# Patient Record
Sex: MALE | Race: WHITE | NOT HISPANIC OR LATINO | Employment: OTHER | URBAN - NONMETROPOLITAN AREA
[De-identification: names, ages, dates, MRNs, and addresses within clinical notes are randomized per-mention and may not be internally consistent; named-entity substitution may affect disease eponyms.]

---

## 2021-02-23 ENCOUNTER — CONSULT (OUTPATIENT)
Dept: CARDIOLOGY | Facility: CLINIC | Age: 61
End: 2021-02-23

## 2021-02-23 VITALS
DIASTOLIC BLOOD PRESSURE: 100 MMHG | HEART RATE: 82 BPM | HEIGHT: 71 IN | TEMPERATURE: 97.8 F | SYSTOLIC BLOOD PRESSURE: 160 MMHG | BODY MASS INDEX: 34.44 KG/M2 | OXYGEN SATURATION: 99 % | RESPIRATION RATE: 18 BRPM | WEIGHT: 246 LBS

## 2021-02-23 DIAGNOSIS — I25.10 CORONARY ARTERY DISEASE INVOLVING NATIVE CORONARY ARTERY OF NATIVE HEART, ANGINA PRESENCE UNSPECIFIED: Primary | ICD-10-CM

## 2021-02-23 DIAGNOSIS — E78.5 HYPERLIPIDEMIA, UNSPECIFIED HYPERLIPIDEMIA TYPE: ICD-10-CM

## 2021-02-23 DIAGNOSIS — I10 ESSENTIAL HYPERTENSION: ICD-10-CM

## 2021-02-23 PROCEDURE — 99204 OFFICE O/P NEW MOD 45 MIN: CPT | Performed by: INTERNAL MEDICINE

## 2021-02-23 RX ORDER — LISINOPRIL 20 MG/1
20 TABLET ORAL DAILY
Qty: 90 TABLET | Refills: 3 | Status: SHIPPED | OUTPATIENT
Start: 2021-02-23 | End: 2021-03-16 | Stop reason: HOSPADM

## 2021-02-23 RX ORDER — ASPIRIN 81 MG/1
81 TABLET ORAL DAILY
COMMUNITY
End: 2021-02-23 | Stop reason: SDUPTHER

## 2021-02-23 RX ORDER — LISINOPRIL 10 MG/1
10 TABLET ORAL DAILY
COMMUNITY
End: 2021-02-23 | Stop reason: SDUPTHER

## 2021-02-23 RX ORDER — CARVEDILOL 12.5 MG/1
6.25 TABLET ORAL 2 TIMES DAILY WITH MEALS
Qty: 60 TABLET | Refills: 3 | Status: ON HOLD | OUTPATIENT
Start: 2021-02-23 | End: 2021-03-16 | Stop reason: SDUPTHER

## 2021-02-23 RX ORDER — ATORVASTATIN CALCIUM 80 MG/1
80 TABLET, FILM COATED ORAL DAILY
COMMUNITY
End: 2021-02-23 | Stop reason: SDUPTHER

## 2021-02-23 RX ORDER — ASPIRIN 81 MG/1
81 TABLET ORAL DAILY
Qty: 180 TABLET | Refills: 3 | Status: SHIPPED | OUTPATIENT
Start: 2021-02-23

## 2021-02-23 RX ORDER — ATORVASTATIN CALCIUM 80 MG/1
80 TABLET, FILM COATED ORAL DAILY
Qty: 90 TABLET | Refills: 3 | Status: SHIPPED | OUTPATIENT
Start: 2021-02-23 | End: 2022-03-03

## 2021-02-23 RX ORDER — NITROGLYCERIN 0.4 MG/1
TABLET SUBLINGUAL
Qty: 30 TABLET | Refills: 3 | Status: SHIPPED | OUTPATIENT
Start: 2021-02-23

## 2021-02-23 RX ORDER — CARVEDILOL 6.25 MG/1
6.25 TABLET ORAL 2 TIMES DAILY WITH MEALS
COMMUNITY
End: 2021-02-23 | Stop reason: SDUPTHER

## 2021-03-04 ENCOUNTER — TELEPHONE (OUTPATIENT)
Dept: CARDIOLOGY | Facility: CLINIC | Age: 61
End: 2021-03-04

## 2021-03-04 NOTE — TELEPHONE ENCOUNTER
Claudia girlfriend called stating that patient is eating NTG like [employer] TAC's he has already used 30 day Rx that was filled at the end of Feb 2021, he is to have his stress test on Monday and she is very worried for him. Please advise.

## 2021-03-05 ENCOUNTER — LAB (OUTPATIENT)
Dept: LAB | Facility: HOSPITAL | Age: 61
End: 2021-03-05

## 2021-03-05 ENCOUNTER — OFFICE VISIT (OUTPATIENT)
Dept: CARDIOLOGY | Facility: CLINIC | Age: 61
End: 2021-03-05

## 2021-03-05 VITALS
HEIGHT: 71 IN | HEART RATE: 77 BPM | SYSTOLIC BLOOD PRESSURE: 122 MMHG | WEIGHT: 245.6 LBS | DIASTOLIC BLOOD PRESSURE: 92 MMHG | BODY MASS INDEX: 34.38 KG/M2 | RESPIRATION RATE: 20 BRPM | OXYGEN SATURATION: 98 %

## 2021-03-05 DIAGNOSIS — I25.10 CORONARY ARTERY DISEASE INVOLVING NATIVE CORONARY ARTERY OF NATIVE HEART, ANGINA PRESENCE UNSPECIFIED: ICD-10-CM

## 2021-03-05 DIAGNOSIS — I20.0 UNSTABLE ANGINA (HCC): Primary | ICD-10-CM

## 2021-03-05 DIAGNOSIS — Z01.818 PRE-OP EXAM: ICD-10-CM

## 2021-03-05 PROCEDURE — C9803 HOPD COVID-19 SPEC COLLECT: HCPCS

## 2021-03-05 PROCEDURE — U0004 COV-19 TEST NON-CDC HGH THRU: HCPCS

## 2021-03-05 PROCEDURE — 99214 OFFICE O/P EST MOD 30 MIN: CPT | Performed by: INTERNAL MEDICINE

## 2021-03-05 PROCEDURE — 93000 ELECTROCARDIOGRAM COMPLETE: CPT | Performed by: INTERNAL MEDICINE

## 2021-03-05 RX ORDER — ISOSORBIDE MONONITRATE 30 MG/1
30 TABLET, EXTENDED RELEASE ORAL DAILY
Qty: 30 TABLET | Refills: 0 | Status: ON HOLD | OUTPATIENT
Start: 2021-03-05 | End: 2021-03-08 | Stop reason: SDUPTHER

## 2021-03-05 NOTE — PROGRESS NOTES
[facility] Cardiology Office Follow Up Note    Sony Marks  [id number]  2020    Primary Care Provider: Johan Tse MD    Chief Complaint: Chest pain    History of Present Illness:   Mr. Sony Marks is a 60 y.o. male who presents to the Cardiology Clinic for follow up of chest pain.  The patient has a past medical history significant for hypertension and prior tobacco use.  He has a past cardiac history significant for an anterior STEMI in .  At that time, he reportedly underwent thrombolytic therapy and subsequent thrombectomy with PCI and placement of a 3.0 x 28 mm stent in the LAD.  He initially presented to the cardiology clinic in  for evaluation of chest pain consistent with angina.  He was subsequently scheduled for an outpatient stress test, however presents today for worsening chest discomfort.  The patient reports that for approximately the past week he has been requiring frequent doses of sublingual nitroglycerin due to recurrent episodes of substernal chest discomfort.  He also notes mild shortness of breath.  The episodes primarily occur during exertion, however he has had several episodes while at rest.  His discomfort is typically relieved with a single sublingual dose of nitroglycerin.  He denies any associated nausea, vomiting, or diaphoresis.  He does note fatigue, which has been progressive.  No orthopnea, PND, or lower extremity swelling.  No other specific complaints at this time.     Past Cardiac Testin. Last Coronary Angio: , reports unavailable  2. Prior Stress Testing: None  3. Last Echo: None  4. Prior Holter Monitor: None       Review of Systems:   Review of Systems   Constitutional: Positive for fatigue. Negative for activity change, appetite change, chills, diaphoresis, fever, unexpected weight gain and unexpected weight loss.   Eyes: Negative for blurred vision and double vision.   Respiratory: Positive for chest tightness.  "Negative for cough, shortness of breath and wheezing.    Cardiovascular: Positive for chest pain. Negative for palpitations and leg swelling.   Gastrointestinal: Negative for abdominal pain, anal bleeding, blood in stool and GERD.   Endocrine: Negative for cold intolerance and heat intolerance.   Genitourinary: Negative for hematuria.   Neurological: Negative for dizziness, syncope, weakness and light-headedness.   Hematological: Does not bruise/bleed easily.   Psychiatric/Behavioral: Negative for depressed mood and stress. The patient is not nervous/anxious.        I have reviewed and/or updated the patient's past medical, past surgical, family, social history, problem list and allergies as appropriate.     Medications:     Current Outpatient Medications:   •  aspirin 81 MG EC tablet, Take 1 tablet by mouth Daily., Disp: 180 tablet, Rfl: 3  •  atorvastatin (LIPITOR) 80 MG tablet, Take 1 tablet by mouth Daily., Disp: 90 tablet, Rfl: 3  •  carvedilol (COREG) 12.5 MG tablet, Take 0.5 tablets by mouth 2 (Two) Times a Day With Meals., Disp: 60 tablet, Rfl: 3  •  lisinopril (PRINIVIL,ZESTRIL) 20 MG tablet, Take 1 tablet by mouth Daily., Disp: 90 tablet, Rfl: 3  •  nitroglycerin (NITROSTAT) 0.4 MG SL tablet, 1 under the tongue as needed for angina, may repeat q5mins for up three doses, Disp: 30 tablet, Rfl: 3    Physical Exam:  Vital Signs:   Vitals:    03/05/21 1103   Pulse: 77   Resp: 20   SpO2: 98%   Weight: 111 kg (245 lb 9.6 oz)   Height: 180.3 cm (70.98\")       Physical Exam  Constitutional:       General: He is not in acute distress.     Appearance: Normal appearance. He is well-developed. He is not diaphoretic.   HENT:      Head: Normocephalic and atraumatic.   Eyes:      General: No scleral icterus.     Pupils: Pupils are equal, round, and reactive to light.   Neck:      Trachea: No tracheal deviation.   Cardiovascular:      Rate and Rhythm: Normal rate and regular rhythm.      Heart sounds: Normal heart sounds. " No murmur. No friction rub. No gallop.       Comments: Normal JVD.  2+ right radial pulse  Pulmonary:      Effort: Pulmonary effort is normal. No respiratory distress.      Breath sounds: Normal breath sounds. No stridor. No wheezing or rales.   Chest:      Chest wall: No tenderness.   Abdominal:      General: Bowel sounds are normal. There is no distension.      Palpations: Abdomen is soft.      Tenderness: There is no abdominal tenderness. There is no guarding or rebound.   Musculoskeletal:         General: No swelling. Normal range of motion.      Cervical back: Neck supple. No tenderness.   Lymphadenopathy:      Cervical: No cervical adenopathy.   Skin:     General: Skin is warm and dry.      Findings: No erythema.   Neurological:      General: No focal deficit present.      Mental Status: He is alert and oriented to person, place, and time.   Psychiatric:         Mood and Affect: Mood normal.         Behavior: Behavior normal.         Results Review:   I reviewed the patient's new clinical results.  I personally viewed and interpreted the patient's EKG/Telemetry data      ECG 12 Lead    Date/Time: 3/5/2021 8:29 AM  Performed by: Sree Johnson MD  Authorized by: Sree Johnson MD   Comparison: not compared with previous ECG   Rhythm: sinus rhythm  Rate: normal  QRS axis: normal  Other findings comments: Prior anteroseptal MI    Clinical impression: abnormal EKG            Assessment / Plan:        1.  Coronary artery disease / Unstable Angina  --History of anterior MI in 11 with PCI to the LAD  --Current escalation in chest pain is consistent with unstable angina  --ECG shows evidence of prior anteroseptal MI, nonspecific ST changes  --Given escalation of symptoms, discussed the risks and benefits of coronary angiography with online PCI as indicated and the patient is agreeable to proceed.  Will schedule for Monday.  --Continue carvedilol and start isosorbide as antianginal  --Continue  aspirin  --Continue high intensity statin  --Sublingual nitroglycerin as needed  --Advised to present to the emergency department over the weekend if episodes of chest pain not relieved with sublingual nitroglycerin     2. Ischemic cardiomyopathy  --On review of records, appears to have a history of ischemic cardiomyopathy with an LVEF in the 30s  --Does not appear to have a history of symptomatic heart failure  --Euvolemic today  --Will repeat echocardiogram to assess LVEF  --Continue carvedilol and lisinopril    3.  Hypertension  --Currently well controlled     4. Hyperlipidemia  --On high intensity statin      Follow Up:   No follow-ups on file.      Thank you for allowing me to participate in the care of your patient. Please to not hesitate to contact me with additional questions or concerns.     GISEL Johnson MD  Interventional Cardiology   03/05/2021  11:05 EST

## 2021-03-05 NOTE — H&P (VIEW-ONLY)
[facility] Cardiology Office Follow Up Note    Sony Marks  [id number]  2020    Primary Care Provider: Johan Tse MD    Chief Complaint: Chest pain    History of Present Illness:   Mr. Sony Marks is a 60 y.o. male who presents to the Cardiology Clinic for follow up of chest pain.  The patient has a past medical history significant for hypertension and prior tobacco use.  He has a past cardiac history significant for an anterior STEMI in .  At that time, he reportedly underwent thrombolytic therapy and subsequent thrombectomy with PCI and placement of a 3.0 x 28 mm stent in the LAD.  He initially presented to the cardiology clinic in  for evaluation of chest pain consistent with angina.  He was subsequently scheduled for an outpatient stress test, however presents today for worsening chest discomfort.  The patient reports that for approximately the past week he has been requiring frequent doses of sublingual nitroglycerin due to recurrent episodes of substernal chest discomfort.  He also notes mild shortness of breath.  The episodes primarily occur during exertion, however he has had several episodes while at rest.  His discomfort is typically relieved with a single sublingual dose of nitroglycerin.  He denies any associated nausea, vomiting, or diaphoresis.  He does note fatigue, which has been progressive.  No orthopnea, PND, or lower extremity swelling.  No other specific complaints at this time.     Past Cardiac Testin. Last Coronary Angio: , reports unavailable  2. Prior Stress Testing: None  3. Last Echo: None  4. Prior Holter Monitor: None       Review of Systems:   Review of Systems   Constitutional: Positive for fatigue. Negative for activity change, appetite change, chills, diaphoresis, fever, unexpected weight gain and unexpected weight loss.   Eyes: Negative for blurred vision and double vision.   Respiratory: Positive for chest tightness.  "Negative for cough, shortness of breath and wheezing.    Cardiovascular: Positive for chest pain. Negative for palpitations and leg swelling.   Gastrointestinal: Negative for abdominal pain, anal bleeding, blood in stool and GERD.   Endocrine: Negative for cold intolerance and heat intolerance.   Genitourinary: Negative for hematuria.   Neurological: Negative for dizziness, syncope, weakness and light-headedness.   Hematological: Does not bruise/bleed easily.   Psychiatric/Behavioral: Negative for depressed mood and stress. The patient is not nervous/anxious.        I have reviewed and/or updated the patient's past medical, past surgical, family, social history, problem list and allergies as appropriate.     Medications:     Current Outpatient Medications:   •  aspirin 81 MG EC tablet, Take 1 tablet by mouth Daily., Disp: 180 tablet, Rfl: 3  •  atorvastatin (LIPITOR) 80 MG tablet, Take 1 tablet by mouth Daily., Disp: 90 tablet, Rfl: 3  •  carvedilol (COREG) 12.5 MG tablet, Take 0.5 tablets by mouth 2 (Two) Times a Day With Meals., Disp: 60 tablet, Rfl: 3  •  lisinopril (PRINIVIL,ZESTRIL) 20 MG tablet, Take 1 tablet by mouth Daily., Disp: 90 tablet, Rfl: 3  •  nitroglycerin (NITROSTAT) 0.4 MG SL tablet, 1 under the tongue as needed for angina, may repeat q5mins for up three doses, Disp: 30 tablet, Rfl: 3    Physical Exam:  Vital Signs:   Vitals:    03/05/21 1103   Pulse: 77   Resp: 20   SpO2: 98%   Weight: 111 kg (245 lb 9.6 oz)   Height: 180.3 cm (70.98\")       Physical Exam  Constitutional:       General: He is not in acute distress.     Appearance: Normal appearance. He is well-developed. He is not diaphoretic.   HENT:      Head: Normocephalic and atraumatic.   Eyes:      General: No scleral icterus.     Pupils: Pupils are equal, round, and reactive to light.   Neck:      Trachea: No tracheal deviation.   Cardiovascular:      Rate and Rhythm: Normal rate and regular rhythm.      Heart sounds: Normal heart sounds. " No murmur. No friction rub. No gallop.       Comments: Normal JVD.  2+ right radial pulse  Pulmonary:      Effort: Pulmonary effort is normal. No respiratory distress.      Breath sounds: Normal breath sounds. No stridor. No wheezing or rales.   Chest:      Chest wall: No tenderness.   Abdominal:      General: Bowel sounds are normal. There is no distension.      Palpations: Abdomen is soft.      Tenderness: There is no abdominal tenderness. There is no guarding or rebound.   Musculoskeletal:         General: No swelling. Normal range of motion.      Cervical back: Neck supple. No tenderness.   Lymphadenopathy:      Cervical: No cervical adenopathy.   Skin:     General: Skin is warm and dry.      Findings: No erythema.   Neurological:      General: No focal deficit present.      Mental Status: He is alert and oriented to person, place, and time.   Psychiatric:         Mood and Affect: Mood normal.         Behavior: Behavior normal.         Results Review:   I reviewed the patient's new clinical results.  I personally viewed and interpreted the patient's EKG/Telemetry data      ECG 12 Lead    Date/Time: 3/5/2021 8:29 AM  Performed by: Sree Johnson MD  Authorized by: Sree Johnson MD   Comparison: not compared with previous ECG   Rhythm: sinus rhythm  Rate: normal  QRS axis: normal  Other findings comments: Prior anteroseptal MI    Clinical impression: abnormal EKG            Assessment / Plan:        1.  Coronary artery disease / Unstable Angina  --History of anterior MI in 11 with PCI to the LAD  --Current escalation in chest pain is consistent with unstable angina  --ECG shows evidence of prior anteroseptal MI, nonspecific ST changes  --Given escalation of symptoms, discussed the risks and benefits of coronary angiography with online PCI as indicated and the patient is agreeable to proceed.  Will schedule for Monday.  --Continue carvedilol and start isosorbide as antianginal  --Continue  aspirin  --Continue high intensity statin  --Sublingual nitroglycerin as needed  --Advised to present to the emergency department over the weekend if episodes of chest pain not relieved with sublingual nitroglycerin     2.  Hypertension  --Currently well controlled     3.  Hyperlipidemia  --On high intensity statin      Follow Up:   No follow-ups on file.      Thank you for allowing me to participate in the care of your patient. Please to not hesitate to contact me with additional questions or concerns.     GISEL Johnson MD  Interventional Cardiology   03/05/2021  11:05 EST

## 2021-03-05 NOTE — PROGRESS NOTES
"             [facility] Cardiology Office Follow Up Note    Sony Marks  [id number]  2020    Primary Care Provider: Johan Tse MD    Chief Complaint: Chest pain    History of Present Illness:   Mr. Sony Marks is a 60 y.o. male who presents to the Cardiology Clinic for evaluation of chest pain.  The patient has a past medical history significant for hypertension and prior tobacco use.  He has a past cardiac history significant for an anterior STEMI in .  At that time, he reportedly underwent thrombolytic therapy and subsequent thrombectomy with PCI and placement of a 3.0 x 28 mm stent in the LAD.  He presents the cardiology clinic today for evaluation of chest pain.  The patient reports a several week history of intermittent episodes of chest pain described as a \"dull\" substernal and left anterior chest discomfort.  The chest pain occurs randomly, both with exertion as well as occasionally while at rest.  The pain will last for several minutes, before resolving spontaneously.  He does note mild associated dyspnea as well as fatigue.  No diaphoresis.  No history of orthopnea, PND, or lower extremity edema.  No other specific complaints at this time.      Past Cardiac Testin. Last Coronary Angio: , reports unavailable  2. Prior Stress Testing: None  3. Last Echo: None  4. Prior Holter Monitor: None    Review of Systems:   Review of Systems   Constitutional: Positive for fatigue. Negative for activity change, appetite change, chills, diaphoresis, fever, unexpected weight gain and unexpected weight loss.   Eyes: Negative for blurred vision and double vision.   Respiratory: Positive for chest tightness and shortness of breath. Negative for cough and wheezing.    Cardiovascular: Positive for chest pain. Negative for palpitations and leg swelling.   Gastrointestinal: Negative for abdominal pain, anal bleeding, blood in stool and GERD.   Endocrine: Negative for cold " "intolerance and heat intolerance.   Genitourinary: Negative for hematuria.   Neurological: Negative for dizziness, syncope, weakness and light-headedness.   Hematological: Does not bruise/bleed easily.   Psychiatric/Behavioral: Negative for depressed mood and stress. The patient is not nervous/anxious.        I have reviewed and/or updated the patient's past medical, past surgical, family, social history, problem list and allergies as appropriate.     Medications:     Current Outpatient Medications:   •  aspirin 81 MG EC tablet, Take 1 tablet by mouth Daily., Disp: 180 tablet, Rfl: 3  •  atorvastatin (LIPITOR) 80 MG tablet, Take 1 tablet by mouth Daily., Disp: 90 tablet, Rfl: 3  •  carvedilol (COREG) 12.5 MG tablet, Take 0.5 tablets by mouth 2 (Two) Times a Day With Meals., Disp: 60 tablet, Rfl: 3  •  lisinopril (PRINIVIL,ZESTRIL) 20 MG tablet, Take 1 tablet by mouth Daily., Disp: 90 tablet, Rfl: 3  •  isosorbide mononitrate (IMDUR) 30 MG 24 hr tablet, Take 1 tablet by mouth Daily., Disp: 30 tablet, Rfl: 0  •  nitroglycerin (NITROSTAT) 0.4 MG SL tablet, 1 under the tongue as needed for angina, may repeat q5mins for up three doses, Disp: 30 tablet, Rfl: 3    Physical Exam:  Vital Signs:   Vitals:    02/23/21 1553   BP: 160/100   Pulse: 82   Resp: 18   Temp: 97.8 °F (36.6 °C)   SpO2: 99%   Weight: 112 kg (246 lb)   Height: 180.3 cm (71\")       Physical Exam  Constitutional:       General: He is not in acute distress.     Appearance: Normal appearance. He is well-developed. He is not diaphoretic.   HENT:      Head: Normocephalic and atraumatic.   Eyes:      General: No scleral icterus.     Pupils: Pupils are equal, round, and reactive to light.   Neck:      Musculoskeletal: Neck supple. No muscular tenderness.      Trachea: No tracheal deviation.   Cardiovascular:      Rate and Rhythm: Normal rate and regular rhythm.      Heart sounds: Normal heart sounds. No murmur. No friction rub. No gallop.       Comments: Normal " JVD.  2+ right radial pulse  Pulmonary:      Effort: Pulmonary effort is normal. No respiratory distress.      Breath sounds: Normal breath sounds. No stridor. No wheezing or rales.   Chest:      Chest wall: No tenderness.   Abdominal:      General: Bowel sounds are normal. There is no distension.      Palpations: Abdomen is soft.      Tenderness: There is no abdominal tenderness. There is no guarding or rebound.   Musculoskeletal: Normal range of motion.         General: No swelling.   Lymphadenopathy:      Cervical: No cervical adenopathy.   Skin:     General: Skin is warm and dry.      Findings: No erythema.   Neurological:      General: No focal deficit present.      Mental Status: He is alert and oriented to person, place, and time.   Psychiatric:         Mood and Affect: Mood normal.         Behavior: Behavior normal.         Results Review:   I reviewed the patient's new clinical results.  I personally viewed and interpreted the patient's EKG/Telemetry data      ECG 12 Lead    Date/Time: 3/5/2021 11:34 AM  Performed by: Sree Johnson MD  Authorized by: Sree Johnson MD   Comparison: not compared with previous ECG   Rhythm: sinus rhythm  Rate: normal  QRS axis: normal  Other findings comments: Prior anteroseptal infarct.  Nonspecific ST changes.    Clinical impression: abnormal EKG            Assessment / Plan:     1.  Coronary artery disease / Unstable Angina  --History of anterior MI in 2011 with PCI to the LAD  --Current chest pain is concerning for angina  --No acute ischemic changes on ECG  --Will proceed with pharmacologic MPS to further evaluate for underlying ischemia contribute to current symptoms  --Continue aspirin and high intensity statin  --Continue carvedilol as antianginal  --Will add as needed nitroglycerin for recurrent episodes of chest pain    2.  Hypertension  --Currently well controlled    3.  Hyperlipidemia  --On high intensity statin        Follow Up:   Return in about 4 weeks  (around 3/23/2021).      Thank you for allowing me to participate in the care of your patient. Please to not hesitate to contact me with additional questions or concerns.     GISEL Johnson MD  Interventional Cardiology   02/23/2021  11:30 EST

## 2021-03-06 LAB — SARS-COV-2 RNA RESP QL NAA+PROBE: NOT DETECTED

## 2021-03-08 ENCOUNTER — APPOINTMENT (OUTPATIENT)
Dept: NUCLEAR MEDICINE | Facility: HOSPITAL | Age: 61
End: 2021-03-08

## 2021-03-08 ENCOUNTER — TELEPHONE (OUTPATIENT)
Dept: CARDIAC SURGERY | Facility: CLINIC | Age: 61
End: 2021-03-08

## 2021-03-08 ENCOUNTER — HOSPITAL ENCOUNTER (OUTPATIENT)
Facility: HOSPITAL | Age: 61
Setting detail: HOSPITAL OUTPATIENT SURGERY
Discharge: HOME | End: 2021-03-08
Attending: INTERNAL MEDICINE | Admitting: INTERNAL MEDICINE

## 2021-03-08 ENCOUNTER — APPOINTMENT (OUTPATIENT)
Dept: CARDIOLOGY | Facility: HOSPITAL | Age: 61
End: 2021-03-08

## 2021-03-08 VITALS
WEIGHT: 246 LBS | BODY MASS INDEX: 34.44 KG/M2 | DIASTOLIC BLOOD PRESSURE: 82 MMHG | HEART RATE: 82 BPM | HEIGHT: 71 IN | RESPIRATION RATE: 16 BRPM | SYSTOLIC BLOOD PRESSURE: 150 MMHG | TEMPERATURE: 98.6 F | OXYGEN SATURATION: 98 %

## 2021-03-08 DIAGNOSIS — I20.0 UNSTABLE ANGINA (HCC): ICD-10-CM

## 2021-03-08 LAB
ANION GAP SERPL CALCULATED.3IONS-SCNC: 10 MMOL/L (ref 5–15)
BH CV ECHO MEAS - % IVS THICK: 21.3 %
BH CV ECHO MEAS - % LVPW THICK: 8.5 %
BH CV ECHO MEAS - AO MAX PG (FULL): 8.5 MMHG
BH CV ECHO MEAS - AO MAX PG: 12 MMHG
BH CV ECHO MEAS - AO MEAN PG (FULL): 5 MMHG
BH CV ECHO MEAS - AO MEAN PG: 7 MMHG
BH CV ECHO MEAS - AO ROOT AREA (BSA CORRECTED): 1.4
BH CV ECHO MEAS - AO ROOT AREA: 7.6 CM^2
BH CV ECHO MEAS - AO ROOT DIAM: 3.1 CM
BH CV ECHO MEAS - AO V2 MAX: 174 CM/SEC
BH CV ECHO MEAS - AO V2 MEAN: 118 CM/SEC
BH CV ECHO MEAS - AO V2 VTI: 33.6 CM
BH CV ECHO MEAS - AVA(I,A): 2.2 CM^2
BH CV ECHO MEAS - AVA(I,D): 2.2 CM^2
BH CV ECHO MEAS - AVA(V,A): 2.1 CM^2
BH CV ECHO MEAS - AVA(V,D): 2.1 CM^2
BH CV ECHO MEAS - BSA(HAYCOCK): 2.4 M^2
BH CV ECHO MEAS - BSA: 2.3 M^2
BH CV ECHO MEAS - BZI_BMI: 34.3 KILOGRAMS/M^2
BH CV ECHO MEAS - BZI_METRIC_HEIGHT: 180.3 CM
BH CV ECHO MEAS - BZI_METRIC_WEIGHT: 111.6 KG
BH CV ECHO MEAS - EDV(CUBED): 143.1 ML
BH CV ECHO MEAS - EDV(MOD-SP2): 166 ML
BH CV ECHO MEAS - EDV(MOD-SP4): 170 ML
BH CV ECHO MEAS - EDV(TEICH): 131.2 ML
BH CV ECHO MEAS - EF(CUBED): 25.6 %
BH CV ECHO MEAS - EF(MOD-BP): 40.9 %
BH CV ECHO MEAS - EF(MOD-SP2): 39.8 %
BH CV ECHO MEAS - EF(MOD-SP4): 40 %
BH CV ECHO MEAS - EF(TEICH): 20.4 %
BH CV ECHO MEAS - ESV(CUBED): 106.5 ML
BH CV ECHO MEAS - ESV(MOD-SP2): 100 ML
BH CV ECHO MEAS - ESV(MOD-SP4): 102 ML
BH CV ECHO MEAS - ESV(TEICH): 104.4 ML
BH CV ECHO MEAS - FS: 9.4 %
BH CV ECHO MEAS - IVS/LVPW: 1.1
BH CV ECHO MEAS - IVSD: 1.3 CM
BH CV ECHO MEAS - IVSS: 1.5 CM
BH CV ECHO MEAS - LA DIMENSION: 3.1 CM
BH CV ECHO MEAS - LA/AO: 0.99
BH CV ECHO MEAS - LAD MAJOR: 5.2 CM
BH CV ECHO MEAS - LAT PEAK E' VEL: 10 CM/SEC
BH CV ECHO MEAS - LATERAL E/E' RATIO: 7.8
BH CV ECHO MEAS - LV DIASTOLIC VOL/BSA (35-75): 73.8 ML/M^2
BH CV ECHO MEAS - LV MASS(C)D: 258.5 GRAMS
BH CV ECHO MEAS - LV MASS(C)DI: 112.2 GRAMS/M^2
BH CV ECHO MEAS - LV MASS(C)S: 271.5 GRAMS
BH CV ECHO MEAS - LV MASS(C)SI: 117.9 GRAMS/M^2
BH CV ECHO MEAS - LV MAX PG: 3.5 MMHG
BH CV ECHO MEAS - LV MEAN PG: 2 MMHG
BH CV ECHO MEAS - LV SYSTOLIC VOL/BSA (12-30): 44.3 ML/M^2
BH CV ECHO MEAS - LV V1 MAX: 94.2 CM/SEC
BH CV ECHO MEAS - LV V1 MEAN: 63.8 CM/SEC
BH CV ECHO MEAS - LV V1 VTI: 19 CM
BH CV ECHO MEAS - LVIDD: 5.2 CM
BH CV ECHO MEAS - LVIDS: 4.7 CM
BH CV ECHO MEAS - LVLD AP2: 9.5 CM
BH CV ECHO MEAS - LVLD AP4: 9.2 CM
BH CV ECHO MEAS - LVLS AP2: 8.5 CM
BH CV ECHO MEAS - LVLS AP4: 8.4 CM
BH CV ECHO MEAS - LVOT AREA (M): 3.9 CM^2
BH CV ECHO MEAS - LVOT AREA: 3.9 CM^2
BH CV ECHO MEAS - LVOT DIAM: 2.2 CM
BH CV ECHO MEAS - LVPWD: 1.2 CM
BH CV ECHO MEAS - LVPWS: 1.3 CM
BH CV ECHO MEAS - MED PEAK E' VEL: 10.4 CM/SEC
BH CV ECHO MEAS - MEDIAL E/E' RATIO: 7.5
BH CV ECHO MEAS - MV A MAX VEL: 66.2 CM/SEC
BH CV ECHO MEAS - MV DEC TIME: 0.24 SEC
BH CV ECHO MEAS - MV E MAX VEL: 77.8 CM/SEC
BH CV ECHO MEAS - MV E/A: 1.2
BH CV ECHO MEAS - MV MAX PG: 2.8 MMHG
BH CV ECHO MEAS - MV MEAN PG: 1 MMHG
BH CV ECHO MEAS - MV V2 MAX: 84.1 CM/SEC
BH CV ECHO MEAS - MV V2 MEAN: 50.8 CM/SEC
BH CV ECHO MEAS - MV V2 VTI: 23.4 CM
BH CV ECHO MEAS - MVA(VTI): 3.1 CM^2
BH CV ECHO MEAS - PA ACC TIME: 0.13 SEC
BH CV ECHO MEAS - PA MAX PG (FULL): 1.5 MMHG
BH CV ECHO MEAS - PA MAX PG: 3.1 MMHG
BH CV ECHO MEAS - PA MEAN PG (FULL): 1 MMHG
BH CV ECHO MEAS - PA MEAN PG: 2 MMHG
BH CV ECHO MEAS - PA PR(ACCEL): 18.7 MMHG
BH CV ECHO MEAS - PA V2 MAX: 88.5 CM/SEC
BH CV ECHO MEAS - PA V2 MEAN: 55.4 CM/SEC
BH CV ECHO MEAS - PA V2 VTI: 16.3 CM
BH CV ECHO MEAS - PULM A REVS DUR: 0.11 SEC
BH CV ECHO MEAS - PULM A REVS VEL: 27.5 CM/SEC
BH CV ECHO MEAS - PULM DIAS VEL: 58.4 CM/SEC
BH CV ECHO MEAS - PULM S/D: 1.1
BH CV ECHO MEAS - PULM SYS VEL: 67.1 CM/SEC
BH CV ECHO MEAS - RAP SYSTOLE: 8 MMHG
BH CV ECHO MEAS - RV MAX PG: 1.6 MMHG
BH CV ECHO MEAS - RV MEAN PG: 1 MMHG
BH CV ECHO MEAS - RV V1 MAX: 63.5 CM/SEC
BH CV ECHO MEAS - RV V1 MEAN: 35.3 CM/SEC
BH CV ECHO MEAS - RV V1 VTI: 12 CM
BH CV ECHO MEAS - RVSP: 19 MMHG
BH CV ECHO MEAS - SI(AO): 110.8 ML/M^2
BH CV ECHO MEAS - SI(CUBED): 15.9 ML/M^2
BH CV ECHO MEAS - SI(LVOT): 31.9 ML/M^2
BH CV ECHO MEAS - SI(MOD-SP2): 28.7 ML/M^2
BH CV ECHO MEAS - SI(MOD-SP4): 29.5 ML/M^2
BH CV ECHO MEAS - SI(TEICH): 11.7 ML/M^2
BH CV ECHO MEAS - SV(AO): 255.2 ML
BH CV ECHO MEAS - SV(CUBED): 36.6 ML
BH CV ECHO MEAS - SV(LVOT): 73.5 ML
BH CV ECHO MEAS - SV(MOD-SP2): 66 ML
BH CV ECHO MEAS - SV(MOD-SP4): 68 ML
BH CV ECHO MEAS - SV(TEICH): 26.8 ML
BH CV ECHO MEAS - TAPSE (>1.6): 2.3 CM
BH CV ECHO MEAS - TR MAX PG: 11 MMHG
BH CV ECHO MEAS - TR MAX VEL: 165 CM/SEC
BH CV ECHO MEASUREMENTS AVERAGE E/E' RATIO: 7.63
BH CV XLRA - RV BASE: 4.5 CM
BH CV XLRA - RV LENGTH: 8.2 CM
BH CV XLRA - RV MID: 3.4 CM
BH CV XLRA - TDI S': 12.6 CM/SEC
BUN SERPL-MCNC: 21 MG/DL (ref 6–20)
BUN/CREAT SERPL: 19.3 (ref 7–25)
CALCIUM SPEC-SCNC: 9.3 MG/DL (ref 8.6–10.5)
CHLORIDE SERPL-SCNC: 103 MMOL/L (ref 98–107)
CHOLEST SERPL-MCNC: 137 MG/DL (ref 0–200)
CO2 SERPL-SCNC: 25 MMOL/L (ref 22–29)
CREAT SERPL-MCNC: 1.09 MG/DL (ref 0.76–1.27)
DEPRECATED RDW RBC AUTO: 40.5 FL (ref 37–54)
ERYTHROCYTE [DISTWIDTH] IN BLOOD BY AUTOMATED COUNT: 12.1 % (ref 12.3–15.4)
GFR SERPL CREATININE-BSD FRML MDRD: 69 ML/MIN/1.73
GLUCOSE SERPL-MCNC: 129 MG/DL (ref 65–99)
HBA1C MFR BLD: 6.6 % (ref 4.8–5.6)
HCT VFR BLD AUTO: 46.2 % (ref 37.5–51)
HDLC SERPL-MCNC: 49 MG/DL (ref 40–60)
HGB BLD-MCNC: 15.1 G/DL (ref 13–17.7)
LDLC SERPL CALC-MCNC: 71 MG/DL (ref 0–100)
LDLC/HDLC SERPL: 1.44 {RATIO}
LEFT ATRIUM VOLUME INDEX: 18.8 ML/M^2
LEFT ATRIUM VOLUME: 43.4 ML
LV EF 2D ECHO EST: 40 %
MAXIMAL PREDICTED HEART RATE: 161 BPM
MCH RBC QN AUTO: 29.9 PG (ref 26.6–33)
MCHC RBC AUTO-ENTMCNC: 32.7 G/DL (ref 31.5–35.7)
MCV RBC AUTO: 91.5 FL (ref 79–97)
PLATELET # BLD AUTO: 345 10*3/MM3 (ref 140–450)
PMV BLD AUTO: 9.7 FL (ref 6–12)
POTASSIUM SERPL-SCNC: 4.2 MMOL/L (ref 3.5–5.2)
RBC # BLD AUTO: 5.05 10*6/MM3 (ref 4.14–5.8)
SODIUM SERPL-SCNC: 138 MMOL/L (ref 136–145)
STRESS TARGET HR: 137 BPM
TRIGL SERPL-MCNC: 88 MG/DL (ref 0–150)
VLDLC SERPL-MCNC: 17 MG/DL (ref 5–40)
WBC # BLD AUTO: 11.35 10*3/MM3 (ref 3.4–10.8)

## 2021-03-08 PROCEDURE — 93458 L HRT ARTERY/VENTRICLE ANGIO: CPT | Performed by: INTERNAL MEDICINE

## 2021-03-08 PROCEDURE — 0 IOPAMIDOL PER 1 ML: Performed by: INTERNAL MEDICINE

## 2021-03-08 PROCEDURE — C1769 GUIDE WIRE: HCPCS | Performed by: INTERNAL MEDICINE

## 2021-03-08 PROCEDURE — 93306 TTE W/DOPPLER COMPLETE: CPT | Performed by: INTERNAL MEDICINE

## 2021-03-08 PROCEDURE — 93306 TTE W/DOPPLER COMPLETE: CPT

## 2021-03-08 PROCEDURE — 83036 HEMOGLOBIN GLYCOSYLATED A1C: CPT | Performed by: INTERNAL MEDICINE

## 2021-03-08 PROCEDURE — 80048 BASIC METABOLIC PNL TOTAL CA: CPT | Performed by: INTERNAL MEDICINE

## 2021-03-08 PROCEDURE — C1894 INTRO/SHEATH, NON-LASER: HCPCS | Performed by: INTERNAL MEDICINE

## 2021-03-08 PROCEDURE — 25010000003 LIDOCAINE 1 % SOLUTION: Performed by: INTERNAL MEDICINE

## 2021-03-08 PROCEDURE — C1887 CATHETER, GUIDING: HCPCS | Performed by: INTERNAL MEDICINE

## 2021-03-08 PROCEDURE — 85027 COMPLETE CBC AUTOMATED: CPT | Performed by: INTERNAL MEDICINE

## 2021-03-08 PROCEDURE — 93005 ELECTROCARDIOGRAM TRACING: CPT | Performed by: INTERNAL MEDICINE

## 2021-03-08 PROCEDURE — 25010000002 MIDAZOLAM PER 1MG: Performed by: INTERNAL MEDICINE

## 2021-03-08 PROCEDURE — 25010000002 HEPARIN (PORCINE) PER 1000 UNITS: Performed by: INTERNAL MEDICINE

## 2021-03-08 PROCEDURE — 25010000002 FENTANYL CITRATE (PF) 100 MCG/2ML SOLUTION: Performed by: INTERNAL MEDICINE

## 2021-03-08 PROCEDURE — 80061 LIPID PANEL: CPT | Performed by: INTERNAL MEDICINE

## 2021-03-08 RX ORDER — ASPIRIN 81 MG/1
81 TABLET, CHEWABLE ORAL DAILY
Status: DISCONTINUED | OUTPATIENT
Start: 2021-03-08 | End: 2021-03-08 | Stop reason: HOSPADM

## 2021-03-08 RX ORDER — LIDOCAINE HYDROCHLORIDE 10 MG/ML
INJECTION, SOLUTION INFILTRATION; PERINEURAL AS NEEDED
Status: DISCONTINUED | OUTPATIENT
Start: 2021-03-08 | End: 2021-03-08 | Stop reason: HOSPADM

## 2021-03-08 RX ORDER — ISOSORBIDE MONONITRATE 60 MG/1
30 TABLET, EXTENDED RELEASE ORAL DAILY
Qty: 30 TABLET | Refills: 0 | Status: SHIPPED | OUTPATIENT
Start: 2021-03-08 | End: 2021-03-16 | Stop reason: HOSPADM

## 2021-03-08 RX ORDER — SODIUM CHLORIDE 0.9 % (FLUSH) 0.9 %
3 SYRINGE (ML) INJECTION EVERY 12 HOURS SCHEDULED
Status: DISCONTINUED | OUTPATIENT
Start: 2021-03-08 | End: 2021-03-08 | Stop reason: HOSPADM

## 2021-03-08 RX ORDER — SODIUM CHLORIDE 0.9 % (FLUSH) 0.9 %
10 SYRINGE (ML) INJECTION AS NEEDED
Status: DISCONTINUED | OUTPATIENT
Start: 2021-03-08 | End: 2021-03-08 | Stop reason: HOSPADM

## 2021-03-08 RX ORDER — MIDAZOLAM HYDROCHLORIDE 2 MG/2ML
INJECTION, SOLUTION INTRAMUSCULAR; INTRAVENOUS AS NEEDED
Status: DISCONTINUED | OUTPATIENT
Start: 2021-03-08 | End: 2021-03-08 | Stop reason: HOSPADM

## 2021-03-08 RX ORDER — ACETAMINOPHEN 325 MG/1
650 TABLET ORAL EVERY 4 HOURS PRN
Status: DISCONTINUED | OUTPATIENT
Start: 2021-03-08 | End: 2021-03-08 | Stop reason: HOSPADM

## 2021-03-08 RX ORDER — LIDOCAINE HYDROCHLORIDE 10 MG/ML
0.1 INJECTION, SOLUTION EPIDURAL; INFILTRATION; INTRACAUDAL; PERINEURAL ONCE AS NEEDED
Status: DISCONTINUED | OUTPATIENT
Start: 2021-03-08 | End: 2021-03-08 | Stop reason: HOSPADM

## 2021-03-08 RX ORDER — SODIUM CHLORIDE 9 MG/ML
100 INJECTION, SOLUTION INTRAVENOUS CONTINUOUS
Status: DISCONTINUED | OUTPATIENT
Start: 2021-03-08 | End: 2021-03-08 | Stop reason: HOSPADM

## 2021-03-08 RX ORDER — FENTANYL CITRATE 50 UG/ML
INJECTION, SOLUTION INTRAMUSCULAR; INTRAVENOUS AS NEEDED
Status: DISCONTINUED | OUTPATIENT
Start: 2021-03-08 | End: 2021-03-08 | Stop reason: HOSPADM

## 2021-03-08 RX ORDER — SODIUM CHLORIDE 9 MG/ML
1-3 INJECTION, SOLUTION INTRAVENOUS CONTINUOUS
Status: DISCONTINUED | OUTPATIENT
Start: 2021-03-08 | End: 2021-03-08 | Stop reason: HOSPADM

## 2021-03-08 RX ADMIN — ASPIRIN 81 MG 81 MG: 81 TABLET ORAL at 09:37

## 2021-03-08 NOTE — DISCHARGE INSTRUCTIONS
Metformin extended-release tablets  What is this medicine?  METFORMIN (met FOR min) is used to treat type 2 diabetes. It helps to control blood sugar. Treatment is combined with diet and exercise. This medicine can be used alone or with other medicines for diabetes.  This medicine may be used for other purposes; ask your health care provider or pharmacist if you have questions.  COMMON BRAND NAME(S): Fortamet, Glucophage XR, Glumetza  What should I tell my health care provider before I take this medicine?  They need to know if you have any of these conditions:  · anemia  · dehydration  · heart disease  · frequently drink alcohol-containing beverages  · kidney disease  · liver disease  · polycystic ovary syndrome  · serious infection or injury  · vomiting  · an unusual or allergic reaction to metformin, other medicines, foods, dyes, or preservatives  · pregnant or trying to get pregnant  · breast-feeding  How should I use this medicine?  Take this medicine by mouth with a glass of water. Follow the directions on the prescription label. Take this medicine with food. Take your medicine at regular intervals. Do not take your medicine more often than directed. Do not stop taking except on your doctor's advice.  Talk to your pediatrician regarding the use of this medicine in children. Special care may be needed.  Overdosage: If you think you have taken too much of this medicine contact a poison control center or emergency room at once.  NOTE: This medicine is only for you. Do not share this medicine with others.  What if I miss a dose?  If you miss a dose, take it as soon as you can. If it is almost time for your next dose, take only that dose. Do not take double or extra doses.  What may interact with this medicine?  Do not take this medicine with any of the following medications:  · certain contrast medicines given before X-rays, CT scans, MRI, or other procedures  · dofetilide  This medicine may also interact with the  following medications:  · acetazolamide  · alcohol  · certain antivirals for HIV or hepatitis  · certain medicines for blood pressure, heart disease, irregular heart beat  · cimetidine  · dichlorphenamide  · digoxin  · diuretics  · female hormones, like estrogens or progestins and birth control pills  · glycopyrrolate  · isoniazid  · lamotrigine  · memantine  · methazolamide  · metoclopramide  · midodrine  · niacin  · phenothiazines like chlorpromazine, mesoridazine, prochlorperazine, thioridazine  · phenytoin  · ranolazine  · steroid medicines like prednisone or cortisone  · stimulant medicines for attention disorders, weight loss, or to stay awake  · thyroid medicines  · topiramate  · trospium  · vandetanib  · zonisamide  This list may not describe all possible interactions. Give your health care provider a list of all the medicines, herbs, non-prescription drugs, or dietary supplements you use. Also tell them if you smoke, drink alcohol, or use illegal drugs. Some items may interact with your medicine.  What should I watch for while using this medicine?  Visit your doctor or health care professional for regular checks on your progress.  A test called the HbA1C (A1C) will be monitored. This is a simple blood test. It measures your blood sugar control over the last 2 to 3 months. You will receive this test every 3 to 6 months.  Learn how to check your blood sugar. Learn the symptoms of low and high blood sugar and how to manage them.  Always carry a quick-source of sugar with you in case you have symptoms of low blood sugar. Examples include hard sugar candy or glucose tablets. Make sure others know that you can choke if you eat or drink when you develop serious symptoms of low blood sugar, such as seizures or unconsciousness. They must get medical help at once.  Tell your doctor or health care professional if you have high blood sugar. You might need to change the dose of your medicine. If you are sick or  exercising more than usual, you might need to change the dose of your medicine.  Do not skip meals. Ask your doctor or health care professional if you should avoid alcohol. Many nonprescription cough and cold products contain sugar or alcohol. These can affect blood sugar.  This medicine may cause ovulation in premenopausal women who do not have regular monthly periods. This may increase your chances of becoming pregnant. You should not take this medicine if you become pregnant or think you may be pregnant. Talk with your doctor or health care professional about your birth control options while taking this medicine. Contact your doctor or health care professional right away if you think you are pregnant.  The tablet shell for some brands of this medicine does not dissolve. This is normal. The tablet shell may appear whole in the stool. This is not a cause for concern.  If you are going to need surgery, a MRI, CT scan, or other procedure, tell your doctor that you are taking this medicine. You may need to stop taking this medicine before the procedure.  Wear a medical ID bracelet or chain, and carry a card that describes your disease and details of your medicine and dosage times.  This medicine may cause a decrease in folic acid and vitamin B12. You should make sure that you get enough vitamins while you are taking this medicine. Discuss the foods you eat and the vitamins you take with your health care professional.  What side effects may I notice from receiving this medicine?  Side effects that you should report to your doctor or health care professional as soon as possible:  · allergic reactions like skin rash, itching or hives, swelling of the face, lips, or tongue  · breathing problems  · feeling faint or lightheaded, falls  · muscle aches or pains  · signs and symptoms of low blood sugar such as feeling anxious, confusion, dizziness, increased hunger, unusually weak or tired, sweating, shakiness, cold,  irritable, headache, blurred vision, fast heartbeat, loss of consciousness  · slow or irregular heartbeat  · unusual stomach pain or discomfort  · unusually tired or weak  Side effects that usually do not require medical attention (report to your doctor or health care professional if they continue or are bothersome):  · diarrhea  · headache  · heartburn  · metallic taste in mouth  · nausea  · stomach gas, upset  This list may not describe all possible side effects. Call your doctor for medical advice about side effects. You may report side effects to FDA at 9-817-KGR-1801.  Where should I keep my medicine?  Keep out of the reach of children.  Store at room temperature between 15 and 30 degrees C (59 and 86 degrees F). Protect from light. Throw away any unused medicine after the expiration date.  NOTE: This sheet is a summary. It may not cover all possible information. If you have questions about this medicine, talk to your doctor, pharmacist, or health care provider.  © 2021 [employer] (2019-01-24 18:58:32)  Moderate Conscious Sedation, Adult, Care After  These instructions provide you with information about caring for yourself after your procedure. Your health care provider may also give you more specific instructions. Your treatment has been planned according to current medical practices, but problems sometimes occur. Call your health care provider if you have any problems or questions after your procedure.  What can I expect after the procedure?  After your procedure, it is common:  · To feel sleepy for several hours.  · To feel clumsy and have poor balance for several hours.  · To have poor judgment for several hours.  · To vomit if you eat too soon.  Follow these instructions at home:  For at least 24 hours after the procedure:    · Do not:  ? Participate in activities where you could fall or become injured.  ? Drive.  ? Use heavy machinery.  ? Drink alcohol.  ? Take sleeping pills or medicines that  cause drowsiness.  ? Make important decisions or sign legal documents.  ? Take care of children on your own.  · Rest.  Eating and drinking  · Follow the diet recommended by your health care provider.  · If you vomit:  ? Drink water, juice, or soup when you can drink without vomiting.  ? Make sure you have little or no nausea before eating solid foods.  General instructions  · Have a responsible adult stay with you until you are awake and alert.  · Take over-the-counter and prescription medicines only as told by your health care provider.  · If you smoke, do not smoke without supervision.  · Keep all follow-up visits as told by your health care provider. This is important.  Contact a health care provider if:  · You keep feeling nauseous or you keep vomiting.  · You feel light-headed.  · You develop a rash.  · You have a fever.  Get help right away if:  · You have trouble breathing.  This information is not intended to replace advice given to you by your health care provider. Make sure you discuss any questions you have with your health care provider.  Document Revised: 11/30/2018 Document Reviewed: 04/08/2017  [employer] Patient Education © 2020 [employer]          Radial Artery Coronary Angiogram After Care    Refer to this sheet in the next few weeks. These instructions provide you with information on caring for yourself after your procedure. Your health care provider may also give you more specific instructions. Your treatment has been planned according to current medical practices, but problems sometimes occur. Call your health care provider if you have any problems or questions after your procedure.       Home Care Instructions:  · You may shower the day after the procedure. Remove the bandage (dressing) and gently wash the site with plain soap and water. Gently pat the site dry. You may apply a band aid daily for 2 days if desired.    · Do not apply powder or lotion to the site.  · Do not submerge the affected  site in water for 3 to 5 days or until the site is completely healed.   · Do not flex or bend the affected arm for 24 hours.  · Do not lift, push or pull anything over 10 pounds for 2 days after your procedure.  · Do not operate machinery or power tools for 24 hours.  · Inspect the site at least twice daily. You may notice some bruising at the site and it may be tender for 1 to 2 weeks.     · Increase your fluid intake for the next 2 days.    · Keep arm elevated for 24 hours.  For the remainder of the day, keep your arm in the “Pledge of Allegiance” position when up and about.    · Limit your activity for the next 48 hours and avoid strenuous activity as directed by your physician.   · Cardiac Rehab may or may not be ordered.  Please consult with your physician  · You may drive 24 hours after the procedure unless otherwise instructed by your caregiver.  · A responsible adult should be with you for the first 24 hours after you arrive home.   · Do not make any important legal decisions or sign legal papers for 24 hours. Do not drink alcohol for 24 hours.    · Take medicines only as directed by your health care provider.  Blood thinners may be prescribed after your procedure to improve blood flow through the stent.    · Metformin or any medications containing Metformin should not be taken for 48 hours after your procedure.    · Eat a heart-healthy diet. This should include plenty of fresh fruits and vegetables. Meat should be lean cuts. Avoid the following types of food:    ¨ Food that is high in salt.    ¨ Canned or highly processed food.    ¨ Food that is high in saturated fat or sugar.    ¨ Fried food.    · Make any other lifestyle changes recommended by your health care provider. This may include:    ¨ Not using any tobacco products including cigarettes, chewing tobacco, or electronic cigarettes.   ¨ Managing your weight.    ¨ Getting regular exercise.    ¨ Managing your blood pressure.    ¨ Limiting your alcohol  intake.    ¨ Managing other health problems, such as diabetes.    · Keep all follow-up visits as directed by your health care provider.      Call Your Doctor If:  · You have unusual pain at the radial/ulnar (wrist) site.  · You have redness, warmth, swelling, or pain at the radial/ulnar (wrist) site.  · You have drainage (other than a small amount of blood on the dressing).  · `You have chills or a fever > 101.  · Your arm becomes pale or dark, cool, tingly, or numb.  · You develop chest pain, shortness of breath, feel faint or pass out.    · You have heavy bleeding from the site, hold pressure on the site for 20 minutes.  If the bleeding stops, apply a fresh bandage and call your cardiologist.  However, if you continue to have bleeding, call 911.

## 2021-03-08 NOTE — TELEPHONE ENCOUNTER
----- Message from Panfilo Hamm MD sent at 3/8/2021 10:53 AM EST -----  Please arrange for office appt tomorrow for CAD requested by Dr. Sree Johnson.  Thanks  [facility]

## 2021-03-09 ENCOUNTER — OFFICE VISIT (OUTPATIENT)
Dept: CARDIAC SURGERY | Facility: CLINIC | Age: 61
End: 2021-03-09

## 2021-03-09 ENCOUNTER — APPOINTMENT (OUTPATIENT)
Dept: PREADMISSION TESTING | Facility: HOSPITAL | Age: 61
End: 2021-03-09

## 2021-03-09 ENCOUNTER — APPOINTMENT (OUTPATIENT)
Dept: PULMONOLOGY | Facility: HOSPITAL | Age: 61
End: 2021-03-09

## 2021-03-09 ENCOUNTER — HOSPITAL ENCOUNTER (OUTPATIENT)
Dept: GENERAL RADIOLOGY | Facility: HOSPITAL | Age: 61
Discharge: HOME | End: 2021-03-09

## 2021-03-09 ENCOUNTER — PREP FOR SURGERY (OUTPATIENT)
Dept: OTHER | Facility: HOSPITAL | Age: 61
End: 2021-03-09

## 2021-03-09 VITALS
TEMPERATURE: 97.8 F | BODY MASS INDEX: 33.88 KG/M2 | WEIGHT: 242 LBS | SYSTOLIC BLOOD PRESSURE: 136 MMHG | HEART RATE: 74 BPM | HEIGHT: 71 IN | DIASTOLIC BLOOD PRESSURE: 77 MMHG | OXYGEN SATURATION: 98 %

## 2021-03-09 VITALS — OXYGEN SATURATION: 95 % | WEIGHT: 241.18 LBS | BODY MASS INDEX: 33.77 KG/M2 | HEIGHT: 71 IN

## 2021-03-09 DIAGNOSIS — I20.0 UNSTABLE ANGINA (HCC): Primary | ICD-10-CM

## 2021-03-09 DIAGNOSIS — R42 DIZZINESS: Primary | ICD-10-CM

## 2021-03-09 DIAGNOSIS — I25.110 CORONARY ARTERY DISEASE INVOLVING NATIVE CORONARY ARTERY OF NATIVE HEART WITH UNSTABLE ANGINA PECTORIS (HCC): Primary | ICD-10-CM

## 2021-03-09 DIAGNOSIS — I25.10 CAD IN NATIVE ARTERY: ICD-10-CM

## 2021-03-09 DIAGNOSIS — I25.10 CAD IN NATIVE ARTERY: Primary | ICD-10-CM

## 2021-03-09 LAB
ABO GROUP BLD: NORMAL
ALBUMIN SERPL-MCNC: 4.8 G/DL (ref 3.5–5.2)
ALBUMIN/GLOB SERPL: 2 G/DL
ALP SERPL-CCNC: 78 U/L (ref 39–117)
ALT SERPL W P-5'-P-CCNC: 17 U/L (ref 1–41)
AMPHET+METHAMPHET UR QL: NEGATIVE
AMPHETAMINES UR QL: NEGATIVE
ANION GAP SERPL CALCULATED.3IONS-SCNC: 12 MMOL/L (ref 5–15)
APTT PPP: 32.4 SECONDS (ref 24–37)
AST SERPL-CCNC: 18 U/L (ref 1–40)
BARBITURATES UR QL SCN: NEGATIVE
BASOPHILS # BLD AUTO: 0.07 10*3/MM3 (ref 0–0.2)
BASOPHILS NFR BLD AUTO: 0.6 % (ref 0–1.5)
BENZODIAZ UR QL SCN: NEGATIVE
BILIRUB SERPL-MCNC: 0.6 MG/DL (ref 0–1.2)
BLD GP AB SCN SERPL QL: NEGATIVE
BUN SERPL-MCNC: 13 MG/DL (ref 6–20)
BUN/CREAT SERPL: 14.8 (ref 7–25)
BUPRENORPHINE SERPL-MCNC: NEGATIVE NG/ML
CALCIUM SPEC-SCNC: 9.9 MG/DL (ref 8.6–10.5)
CANNABINOIDS SERPL QL: NEGATIVE
CHLORIDE SERPL-SCNC: 101 MMOL/L (ref 98–107)
CO2 SERPL-SCNC: 28 MMOL/L (ref 22–29)
COCAINE UR QL: NEGATIVE
CREAT SERPL-MCNC: 0.88 MG/DL (ref 0.76–1.27)
DEPRECATED RDW RBC AUTO: 41.4 FL (ref 37–54)
EOSINOPHIL # BLD AUTO: 0.04 10*3/MM3 (ref 0–0.4)
EOSINOPHIL NFR BLD AUTO: 0.3 % (ref 0.3–6.2)
ERYTHROCYTE [DISTWIDTH] IN BLOOD BY AUTOMATED COUNT: 12.2 % (ref 12.3–15.4)
GFR SERPL CREATININE-BSD FRML MDRD: 89 ML/MIN/1.73
GLOBULIN UR ELPH-MCNC: 2.4 GM/DL
GLUCOSE SERPL-MCNC: 117 MG/DL (ref 65–99)
HCT VFR BLD AUTO: 47.5 % (ref 37.5–51)
HGB BLD-MCNC: 15.4 G/DL (ref 13–17.7)
IMM GRANULOCYTES # BLD AUTO: 0.04 10*3/MM3 (ref 0–0.05)
IMM GRANULOCYTES NFR BLD AUTO: 0.3 % (ref 0–0.5)
INR PPP: 0.97 (ref 0.85–1.16)
LYMPHOCYTES # BLD AUTO: 3.48 10*3/MM3 (ref 0.7–3.1)
LYMPHOCYTES NFR BLD AUTO: 27.9 % (ref 19.6–45.3)
MAGNESIUM SERPL-MCNC: 2 MG/DL (ref 1.6–2.6)
MCH RBC QN AUTO: 29.8 PG (ref 26.6–33)
MCHC RBC AUTO-ENTMCNC: 32.4 G/DL (ref 31.5–35.7)
MCV RBC AUTO: 91.9 FL (ref 79–97)
METHADONE UR QL SCN: NEGATIVE
MONOCYTES # BLD AUTO: 0.65 10*3/MM3 (ref 0.1–0.9)
MONOCYTES NFR BLD AUTO: 5.2 % (ref 5–12)
NEUTROPHILS NFR BLD AUTO: 65.7 % (ref 42.7–76)
NEUTROPHILS NFR BLD AUTO: 8.19 10*3/MM3 (ref 1.7–7)
NRBC BLD AUTO-RTO: 0 /100 WBC (ref 0–0.2)
OPIATES UR QL: NEGATIVE
OXYCODONE UR QL SCN: NEGATIVE
PA ADP PRP-ACNC: 173 PRU
PCP UR QL SCN: NEGATIVE
PLATELET # BLD AUTO: 376 10*3/MM3 (ref 140–450)
PMV BLD AUTO: 9.9 FL (ref 6–12)
POTASSIUM SERPL-SCNC: 4.4 MMOL/L (ref 3.5–5.2)
PROPOXYPH UR QL: NEGATIVE
PROT SERPL-MCNC: 7.2 G/DL (ref 6–8.5)
PROTHROMBIN TIME: 12.6 SECONDS (ref 11.5–14)
RBC # BLD AUTO: 5.17 10*6/MM3 (ref 4.14–5.8)
RH BLD: POSITIVE
SARS-COV-2 RNA RESP QL NAA+PROBE: NOT DETECTED
SODIUM SERPL-SCNC: 141 MMOL/L (ref 136–145)
T&S EXPIRATION DATE: NORMAL
TRICYCLICS UR QL SCN: NEGATIVE
WBC # BLD AUTO: 12.47 10*3/MM3 (ref 3.4–10.8)

## 2021-03-09 PROCEDURE — 86901 BLOOD TYPING SEROLOGIC RH(D): CPT

## 2021-03-09 PROCEDURE — 80053 COMPREHEN METABOLIC PANEL: CPT

## 2021-03-09 PROCEDURE — 83735 ASSAY OF MAGNESIUM: CPT

## 2021-03-09 PROCEDURE — 86923 COMPATIBILITY TEST ELECTRIC: CPT

## 2021-03-09 PROCEDURE — 36415 COLL VENOUS BLD VENIPUNCTURE: CPT

## 2021-03-09 PROCEDURE — 80306 DRUG TEST PRSMV INSTRMNT: CPT

## 2021-03-09 PROCEDURE — 99204 OFFICE O/P NEW MOD 45 MIN: CPT | Performed by: THORACIC SURGERY (CARDIOTHORACIC VASCULAR SURGERY)

## 2021-03-09 PROCEDURE — C9803 HOPD COVID-19 SPEC COLLECT: HCPCS

## 2021-03-09 PROCEDURE — 94375 RESPIRATORY FLOW VOLUME LOOP: CPT | Performed by: INTERNAL MEDICINE

## 2021-03-09 PROCEDURE — 85610 PROTHROMBIN TIME: CPT

## 2021-03-09 PROCEDURE — 85730 THROMBOPLASTIN TIME PARTIAL: CPT

## 2021-03-09 PROCEDURE — U0003 INFECTIOUS AGENT DETECTION BY NUCLEIC ACID (DNA OR RNA); SEVERE ACUTE RESPIRATORY SYNDROME CORONAVIRUS 2 (SARS-COV-2) (CORONAVIRUS DISEASE [COVID-19]), AMPLIFIED PROBE TECHNIQUE, MAKING USE OF HIGH THROUGHPUT TECHNOLOGIES AS DESCRIBED BY CMS-2020-01-R: HCPCS

## 2021-03-09 PROCEDURE — 86900 BLOOD TYPING SEROLOGIC ABO: CPT

## 2021-03-09 PROCEDURE — 71046 X-RAY EXAM CHEST 2 VIEWS: CPT

## 2021-03-09 PROCEDURE — 85025 COMPLETE CBC W/AUTO DIFF WBC: CPT

## 2021-03-09 PROCEDURE — 94010 BREATHING CAPACITY TEST: CPT

## 2021-03-09 PROCEDURE — 86850 RBC ANTIBODY SCREEN: CPT

## 2021-03-09 PROCEDURE — 85576 BLOOD PLATELET AGGREGATION: CPT

## 2021-03-09 RX ORDER — ASPIRIN 325 MG
325 TABLET ORAL NIGHTLY
Status: CANCELLED | OUTPATIENT
Start: 2021-03-09 | End: 2021-03-10

## 2021-03-09 RX ORDER — ASPIRIN 325 MG
325 TABLET ORAL NIGHTLY
Status: SHIPPED | OUTPATIENT
Start: 2021-03-09 | End: 2021-03-10

## 2021-03-09 RX ORDER — CHLORHEXIDINE GLUCONATE 0.12 MG/ML
15 RINSE ORAL ONCE
Status: CANCELLED | OUTPATIENT
Start: 2021-03-11 | End: 2021-03-11

## 2021-03-09 RX ORDER — NITROGLYCERIN 0.4 MG/1
0.4 TABLET SUBLINGUAL
Status: CANCELLED | OUTPATIENT
Start: 2021-03-11

## 2021-03-09 RX ORDER — CHLORHEXIDINE GLUCONATE 500 MG/1
1 CLOTH TOPICAL EVERY 12 HOURS PRN
Status: CANCELLED | OUTPATIENT
Start: 2021-03-11

## 2021-03-09 RX ORDER — CHLORHEXIDINE GLUCONATE 500 MG/1
1 CLOTH TOPICAL EVERY 12 HOURS PRN
Status: ACTIVE | OUTPATIENT
Start: 2021-03-09

## 2021-03-09 RX ORDER — CHLORHEXIDINE GLUCONATE 500 MG/1
1 CLOTH TOPICAL EVERY 12 HOURS PRN
Status: CANCELLED | OUTPATIENT
Start: 2021-03-09

## 2021-03-09 RX ORDER — ACETAMINOPHEN 325 MG/1
650 TABLET ORAL EVERY 4 HOURS PRN
Status: CANCELLED | OUTPATIENT
Start: 2021-03-11

## 2021-03-09 NOTE — PAT
Patient to apply Chlorhexadine wipes  to surgical area (as instructed) the night before procedure and the AM of procedure. Wipes provided.  Colon screening information booklet given to patient during PAT visit  Blood bank bracelet applied to patient during Pre Admission Testing visit.  Patient instructed not to remove from arm until after procedure and they are discharged from the hospital.  Explained to patient that they may shower and get the bracelet wet, but not to immerse under water for longer periods (bathing, swimming, hand dishwashing, etc).  Patient verbalized understanding.  BACTROBAN APPLIED TO EACH NOSTRIL DURING PAT VISIT   Instructed patient to take 325 mg the night before heart surgery as per CT surgeon's order.  Patient and/or family verbalized understanding.

## 2021-03-09 NOTE — PROGRESS NOTES
03/09/2021  Patient Information  [facility] [id number]   1960  'PCP/Referring Physician'  Johan Tse MD  [phone]  No ref. provider found    Chief Complaint   Patient presents with   • Consult     Np referred for coronary artery disease,complains of chest pain and fatigue.   • Coronary Artery Disease       History of Present Illness: 60-year-old  male with a history of hypertension, hyperlipidemia, coronary artery disease status post stenting, remote tobacco abuse and newly diagnosed diabetes who presents with chest pain.  For the past 10 to 12 days, the patient notes worsening substernal chest pain.  He describes this is a significant ache that starts in the back and radiates to the middle of his chest.  He has associated fatigue and shortness of breath with significant exertion.  The patient lost his previous primary care physician and had not taken his medications for several years.    Patient Active Problem List   Diagnosis   • Unstable angina (CMS/[facility])     Past Medical History:   Diagnosis Date   • Coronary artery disease    • Heart attack (CMS/HCC) 01/2011   • Hyperlipidemia    • Hypertension      Past Surgical History:   Procedure Laterality Date   • CARDIAC CATHETERIZATION     • CARDIAC CATHETERIZATION N/A 3/8/2021    Procedure: Coronary angiography;  Surgeon: Sree Johnson MD;  Location: [facility] CATH INVASIVE LOCATION;  Service: Cardiology;  Laterality: N/A;   • CARDIAC CATHETERIZATION N/A 3/8/2021    Procedure: Left Heart Cath;  Surgeon: Sree Johnson MD;  Location: [facility] CATH INVASIVE LOCATION;  Service: Cardiology;  Laterality: N/A;   • CORONARY ANGIOPLASTY WITH STENT PLACEMENT  01/2011       Current Outpatient Medications:   •  aspirin 81 MG EC tablet, Take 1 tablet by mouth Daily., Disp: 180 tablet, Rfl: 3  •  atorvastatin (LIPITOR) 80 MG  tablet, Take 1 tablet by mouth Daily., Disp: 90 tablet, Rfl: 3  •  carvedilol (COREG) 12.5 MG tablet, Take 0.5 tablets by mouth 2 (Two) Times a Day With Meals., Disp: 60 tablet, Rfl: 3  •  isosorbide mononitrate (IMDUR) 60 MG 24 hr tablet, Take 0.5 tablets by mouth Daily., Disp: 30 tablet, Rfl: 0  •  lisinopril (PRINIVIL,ZESTRIL) 20 MG tablet, Take 1 tablet by mouth Daily., Disp: 90 tablet, Rfl: 3  •  [START ON 3/11/2021] metFORMIN (Glucophage) 500 MG tablet, Take 1 tablet by mouth 2 (Two) Times a Day With Meals., Disp: 60 tablet, Rfl: 3  •  nitroglycerin (NITROSTAT) 0.4 MG SL tablet, 1 under the tongue as needed for angina, may repeat q5mins for up three doses, Disp: 30 tablet, Rfl: 3  No current facility-administered medications for this visit.  No Known Allergies  Social History     Socioeconomic History   • Marital status: Single     Spouse name: Not on file   • Number of children: 2   • Years of education: Not on file   • Highest education level: Not on file   Tobacco Use   • Smoking status: Former Smoker     Years: 7.00     Types: Cigars   • Smokeless tobacco: Never Used   • Tobacco comment: smoked 3 cigars a week   Substance and Sexual Activity   • Alcohol use: Yes     Comment: very little   • Drug use: Not Currently   • Sexual activity: Defer     Family History   Problem Relation Age of Onset   • Alzheimer's disease Mother    • Bone cancer Mother    • Coronary artery disease Father    • Heart attack Father    • Diabetes Father    • Hypertension Father    • Lung disease Father      Review of Systems   Constitutional: Positive for malaise/fatigue. Negative for chills, fever, night sweats and weight loss.   HENT: Negative.  Negative for hearing loss, odynophagia and sore throat.    Cardiovascular: Positive for chest pain and dyspnea on exertion. Negative for leg swelling, orthopnea and palpitations.   Respiratory: Negative.  Negative for cough and hemoptysis.    Endocrine: Negative for cold intolerance, heat  "intolerance, polydipsia, polyphagia and polyuria.   Hematologic/Lymphatic: Negative.  Does not bruise/bleed easily.   Skin: Negative.  Negative for itching and rash.   Musculoskeletal: Negative.  Negative for joint pain, joint swelling and myalgias.   Gastrointestinal: Negative.  Negative for abdominal pain, constipation, diarrhea, hematemesis, hematochezia, melena, nausea and vomiting.   Genitourinary: Negative.  Negative for dysuria, frequency and hematuria.   Neurological: Negative.  Negative for focal weakness, headaches, numbness and seizures.   Psychiatric/Behavioral: Negative.  Negative for suicidal ideas.   All other systems reviewed and are negative.    Vitals:    03/09/21 1305   BP: 136/77   BP Location: Left arm   Patient Position: Sitting   Pulse: 74   Temp: 97.8 °F (36.6 °C)   SpO2: 98%   Weight: 110 kg (242 lb)   Height: 180.3 cm (71\")      Physical Exam  Constitutional:       General: He is not in acute distress.     Appearance: He is well-developed. He is not diaphoretic.      Comments:  male who appears stated age   HENT:      Head: Normocephalic and atraumatic.   Eyes:      General: No scleral icterus.     Conjunctiva/sclera: Conjunctivae normal.   Neck:      Vascular: No JVD.      Trachea: No tracheal deviation.   Cardiovascular:      Rate and Rhythm: Normal rate and regular rhythm.      Heart sounds: Normal heart sounds. No murmur. No friction rub. No gallop.    Pulmonary:      Effort: Pulmonary effort is normal. No respiratory distress.      Breath sounds: Normal breath sounds. No wheezing or rales.   Abdominal:      General: There is no distension.      Palpations: Abdomen is soft. There is no mass.      Tenderness: There is no abdominal tenderness. There is no guarding or rebound.   Musculoskeletal:         General: Normal range of motion.      Cervical back: Neck supple.   Lymphadenopathy:      Cervical: No cervical adenopathy.      Upper Body:      Right upper body: No " supraclavicular or axillary adenopathy.      Left upper body: No supraclavicular or axillary adenopathy.   Skin:     General: Skin is warm and dry.      Findings: No erythema or rash.   Neurological:      Mental Status: He is alert and oriented to person, place, and time.   Psychiatric:         Behavior: Behavior normal.         Thought Content: Thought content normal.         Judgment: Judgment normal.         The ROS, past medical history, surgical history, family history, social history and vitals were reviewed by myself and corrected as needed.      Labs/Imaging:  -Transthoracic echocardiogram performed 3/8/2021, personally reviewed and discussed with performing cardiologist Dr. Johnson, demonstrates hypokinesis of the mid anterior wall and apex.  There is grade 1 diastolic dysfunction.  EF 40%.  Trace mitral and tricuspid regurgitation is present.  -Cardiac catheterization performed 3/8/2021, personally reviewed, demonstrates 90% mid LAD stenosis, 80% first obtuse marginal stenosis, 95% proximal right coronary artery stenosis and 40% posterior lateral branch stenosis.  LVEDP 25 mmHg    Assessment/Plan:  60-year-old  male with a history of hypertension, hyperlipidemia, coronary artery disease status post stenting, remote tobacco abuse and newly diagnosed diabetes who presents with chest pain. The patient has unstable angina in the setting of multivessel coronary artery disease.  The patient is a reasonable candidate for coronary artery bypass grafting.  The risks and benefits of surgery were discussed with the patient including pain, bleeding, infection, renal failure, stroke and death.  He understood these risks and wished to proceed with surgery.  A carotid duplex will be obtained to rule out significant stenosis.          Patient Active Problem List   Diagnosis   • Unstable angina (CMS/[facility])

## 2021-03-09 NOTE — H&P (VIEW-ONLY)
03/09/2021  Patient Information  Sony Macias [facility] [id number]   1960  'PCP/Referring Physician'  Johan Tse MD  [phone]  No ref. provider found    Chief Complaint   Patient presents with   • Consult     Np referred for coronary artery disease,complains of chest pain and fatigue.   • Coronary Artery Disease       History of Present Illness: 60-year-old  male with a history of hypertension, hyperlipidemia, coronary artery disease status post stenting, remote tobacco abuse and newly diagnosed diabetes who presents with chest pain.  For the past 10 to 12 days, the patient notes worsening substernal chest pain.  He describes this is a significant ache that starts in the back and radiates to the middle of his chest.  He has associated fatigue and shortness of breath with significant exertion.  The patient lost his previous primary care physician and had not taken his medications for several years.    Patient Active Problem List   Diagnosis   • Unstable angina (CMS/[facility])     Past Medical History:   Diagnosis Date   • Coronary artery disease    • Heart attack (CMS/HCC) 01/2011   • Hyperlipidemia    • Hypertension      Past Surgical History:   Procedure Laterality Date   • CARDIAC CATHETERIZATION     • CARDIAC CATHETERIZATION N/A 3/8/2021    Procedure: Coronary angiography;  Surgeon: Sree Johnson MD;  Location: [facility] CATH INVASIVE LOCATION;  Service: Cardiology;  Laterality: N/A;   • CARDIAC CATHETERIZATION N/A 3/8/2021    Procedure: Left Heart Cath;  Surgeon: Sree Johnson MD;  Location: [facility] CATH INVASIVE LOCATION;  Service: Cardiology;  Laterality: N/A;   • CORONARY ANGIOPLASTY WITH STENT PLACEMENT  01/2011       Current Outpatient Medications:   •  aspirin 81 MG EC tablet, Take 1 tablet by mouth Daily., Disp: 180 tablet, Rfl: 3  •  atorvastatin (LIPITOR) 80 MG  tablet, Take 1 tablet by mouth Daily., Disp: 90 tablet, Rfl: 3  •  carvedilol (COREG) 12.5 MG tablet, Take 0.5 tablets by mouth 2 (Two) Times a Day With Meals., Disp: 60 tablet, Rfl: 3  •  isosorbide mononitrate (IMDUR) 60 MG 24 hr tablet, Take 0.5 tablets by mouth Daily., Disp: 30 tablet, Rfl: 0  •  lisinopril (PRINIVIL,ZESTRIL) 20 MG tablet, Take 1 tablet by mouth Daily., Disp: 90 tablet, Rfl: 3  •  [START ON 3/11/2021] metFORMIN (Glucophage) 500 MG tablet, Take 1 tablet by mouth 2 (Two) Times a Day With Meals., Disp: 60 tablet, Rfl: 3  •  nitroglycerin (NITROSTAT) 0.4 MG SL tablet, 1 under the tongue as needed for angina, may repeat q5mins for up three doses, Disp: 30 tablet, Rfl: 3  No current facility-administered medications for this visit.  No Known Allergies  Social History     Socioeconomic History   • Marital status: Single     Spouse name: Not on file   • Number of children: 2   • Years of education: Not on file   • Highest education level: Not on file   Tobacco Use   • Smoking status: Former Smoker     Years: 7.00     Types: Cigars   • Smokeless tobacco: Never Used   • Tobacco comment: smoked 3 cigars a week   Substance and Sexual Activity   • Alcohol use: Yes     Comment: very little   • Drug use: Not Currently   • Sexual activity: Defer     Family History   Problem Relation Age of Onset   • Alzheimer's disease Mother    • Bone cancer Mother    • Coronary artery disease Father    • Heart attack Father    • Diabetes Father    • Hypertension Father    • Lung disease Father      Review of Systems   Constitutional: Positive for malaise/fatigue. Negative for chills, fever, night sweats and weight loss.   HENT: Negative.  Negative for hearing loss, odynophagia and sore throat.    Cardiovascular: Positive for chest pain and dyspnea on exertion. Negative for leg swelling, orthopnea and palpitations.   Respiratory: Negative.  Negative for cough and hemoptysis.    Endocrine: Negative for cold intolerance, heat  "intolerance, polydipsia, polyphagia and polyuria.   Hematologic/Lymphatic: Negative.  Does not bruise/bleed easily.   Skin: Negative.  Negative for itching and rash.   Musculoskeletal: Negative.  Negative for joint pain, joint swelling and myalgias.   Gastrointestinal: Negative.  Negative for abdominal pain, constipation, diarrhea, hematemesis, hematochezia, melena, nausea and vomiting.   Genitourinary: Negative.  Negative for dysuria, frequency and hematuria.   Neurological: Negative.  Negative for focal weakness, headaches, numbness and seizures.   Psychiatric/Behavioral: Negative.  Negative for suicidal ideas.   All other systems reviewed and are negative.    Vitals:    03/09/21 1305   BP: 136/77   BP Location: Left arm   Patient Position: Sitting   Pulse: 74   Temp: 97.8 °F (36.6 °C)   SpO2: 98%   Weight: 110 kg (242 lb)   Height: 180.3 cm (71\")      Physical Exam  Constitutional:       General: He is not in acute distress.     Appearance: He is well-developed. He is not diaphoretic.      Comments:  male who appears stated age   HENT:      Head: Normocephalic and atraumatic.   Eyes:      General: No scleral icterus.     Conjunctiva/sclera: Conjunctivae normal.   Neck:      Vascular: No JVD.      Trachea: No tracheal deviation.   Cardiovascular:      Rate and Rhythm: Normal rate and regular rhythm.      Heart sounds: Normal heart sounds. No murmur. No friction rub. No gallop.    Pulmonary:      Effort: Pulmonary effort is normal. No respiratory distress.      Breath sounds: Normal breath sounds. No wheezing or rales.   Abdominal:      General: There is no distension.      Palpations: Abdomen is soft. There is no mass.      Tenderness: There is no abdominal tenderness. There is no guarding or rebound.   Musculoskeletal:         General: Normal range of motion.      Cervical back: Neck supple.   Lymphadenopathy:      Cervical: No cervical adenopathy.      Upper Body:      Right upper body: No " supraclavicular or axillary adenopathy.      Left upper body: No supraclavicular or axillary adenopathy.   Skin:     General: Skin is warm and dry.      Findings: No erythema or rash.   Neurological:      Mental Status: He is alert and oriented to person, place, and time.   Psychiatric:         Behavior: Behavior normal.         Thought Content: Thought content normal.         Judgment: Judgment normal.         The ROS, past medical history, surgical history, family history, social history and vitals were reviewed by myself and corrected as needed.      Labs/Imaging:  -Transthoracic echocardiogram performed 3/8/2021, personally reviewed and discussed with performing cardiologist Dr. Johnson, demonstrates hypokinesis of the mid anterior wall and apex.  There is grade 1 diastolic dysfunction.  EF 40%.  Trace mitral and tricuspid regurgitation is present.  -Cardiac catheterization performed 3/8/2021, personally reviewed, demonstrates 90% mid LAD stenosis, 80% first obtuse marginal stenosis, 95% proximal right coronary artery stenosis and 40% posterior lateral branch stenosis.  LVEDP 25 mmHg    Assessment/Plan:  60-year-old  male with a history of hypertension, hyperlipidemia, coronary artery disease status post stenting, remote tobacco abuse and newly diagnosed diabetes who presents with chest pain. The patient has unstable angina in the setting of multivessel coronary artery disease.  The patient is a reasonable candidate for coronary artery bypass grafting.  The risks and benefits of surgery were discussed with the patient including pain, bleeding, infection, renal failure, stroke and death.  He understood these risks and wished to proceed with surgery.  A carotid duplex will be obtained to rule out significant stenosis.          Patient Active Problem List   Diagnosis   • Unstable angina (CMS/[facility])

## 2021-03-10 ENCOUNTER — HOSPITAL ENCOUNTER (OUTPATIENT)
Dept: ULTRASOUND IMAGING | Facility: HOSPITAL | Age: 61
Discharge: HOME | End: 2021-03-10
Admitting: PHYSICIAN ASSISTANT

## 2021-03-10 DIAGNOSIS — R42 DIZZINESS: ICD-10-CM

## 2021-03-10 DIAGNOSIS — R42 DIZZINESS: Primary | ICD-10-CM

## 2021-03-10 PROCEDURE — 93880 EXTRACRANIAL BILAT STUDY: CPT

## 2021-03-10 NOTE — RESEARCH
[facility] Adult Cardiac Surgery Database Version 4.20  RISK SCORES  Procedure: Isolated CAB  Risk of Mortality:  0.469%  Renal Failure:  0.471%  Permanent Stroke:  0.502%  Prolonged Ventilation:  3.191%  DSW Infection:  0.153%  Reoperation:  1.386%  Morbidity or Mortality:  5.210%  Short Length of Stay:  68.736%  Long Length of Stay:  1.378%

## 2021-03-11 ENCOUNTER — APPOINTMENT (OUTPATIENT)
Dept: GENERAL RADIOLOGY | Facility: HOSPITAL | Age: 61
End: 2021-03-11

## 2021-03-11 ENCOUNTER — ANESTHESIA EVENT (OUTPATIENT)
Dept: PERIOP | Facility: HOSPITAL | Age: 61
End: 2021-03-11

## 2021-03-11 ENCOUNTER — ANCILLARY PROCEDURE (OUTPATIENT)
Dept: PERIOP | Facility: HOSPITAL | Age: 61
End: 2021-03-11

## 2021-03-11 ENCOUNTER — HOSPITAL ENCOUNTER (INPATIENT)
Facility: HOSPITAL | Age: 61
LOS: 5 days | Discharge: HOME | End: 2021-03-16
Attending: THORACIC SURGERY (CARDIOTHORACIC VASCULAR SURGERY) | Admitting: THORACIC SURGERY (CARDIOTHORACIC VASCULAR SURGERY)

## 2021-03-11 ENCOUNTER — ANESTHESIA (OUTPATIENT)
Dept: PERIOP | Facility: HOSPITAL | Age: 61
End: 2021-03-11

## 2021-03-11 DIAGNOSIS — I48.91 ATRIAL FIBRILLATION WITH RVR (HCC): ICD-10-CM

## 2021-03-11 DIAGNOSIS — I25.110 CORONARY ARTERY DISEASE INVOLVING NATIVE CORONARY ARTERY OF NATIVE HEART WITH UNSTABLE ANGINA PECTORIS (HCC): ICD-10-CM

## 2021-03-11 DIAGNOSIS — I25.5 ISCHEMIC CARDIOMYOPATHY: Primary | ICD-10-CM

## 2021-03-11 DIAGNOSIS — E11.9 TYPE 2 DIABETES MELLITUS WITHOUT COMPLICATION, WITHOUT LONG-TERM CURRENT USE OF INSULIN (HCC): ICD-10-CM

## 2021-03-11 DIAGNOSIS — I25.10 CAD IN NATIVE ARTERY: ICD-10-CM

## 2021-03-11 PROBLEM — I20.0 UNSTABLE ANGINA: Status: RESOLVED | Noted: 2021-03-05 | Resolved: 2021-03-11

## 2021-03-11 LAB
ABO GROUP BLD: NORMAL
ACT BLD: 103 SECONDS (ref 82–152)
ACT BLD: 120 SECONDS (ref 82–152)
ACT BLD: 136 SECONDS (ref 82–152)
ACT BLD: 411 SECONDS (ref 82–152)
ACT BLD: 428 SECONDS (ref 82–152)
ACT BLD: 433 SECONDS (ref 82–152)
ACT BLD: 439 SECONDS (ref 82–152)
ACT BLD: 488 SECONDS (ref 82–152)
ACT BLD: 522 SECONDS (ref 82–152)
ALBUMIN SERPL-MCNC: 4.9 G/DL (ref 3.5–5.2)
ALBUMIN SERPL-MCNC: 5.1 G/DL (ref 3.5–5.2)
ALBUMIN SERPL-MCNC: 5.1 G/DL (ref 3.5–5.2)
ANION GAP SERPL CALCULATED.3IONS-SCNC: 12 MMOL/L (ref 5–15)
ANION GAP SERPL CALCULATED.3IONS-SCNC: 13 MMOL/L (ref 5–15)
ANION GAP SERPL CALCULATED.3IONS-SCNC: 14 MMOL/L (ref 5–15)
APTT PPP: 30.5 SECONDS (ref 24–37)
ARTERIAL PATENCY WRIST A: ABNORMAL
ATMOSPHERIC PRESS: ABNORMAL MM[HG]
BASE EXCESS BLDA CALC-SCNC: -0.1 MMOL/L (ref 0–2)
BASE EXCESS BLDA CALC-SCNC: -1 MMOL/L (ref -5–5)
BASE EXCESS BLDA CALC-SCNC: -5.3 MMOL/L (ref 0–2)
BASE EXCESS BLDA CALC-SCNC: 0 MMOL/L (ref -5–5)
BASE EXCESS BLDA CALC-SCNC: 0 MMOL/L (ref -5–5)
BASE EXCESS BLDA CALC-SCNC: 0.9 MMOL/L (ref 0–2)
BASE EXCESS BLDA CALC-SCNC: 2 MMOL/L (ref -5–5)
BASE EXCESS BLDA CALC-SCNC: 3 MMOL/L (ref -5–5)
BASE EXCESS BLDA CALC-SCNC: 4 MMOL/L (ref -5–5)
BASE EXCESS BLDA CALC-SCNC: 4 MMOL/L (ref -5–5)
BASE EXCESS BLDA CALC-SCNC: 7 MMOL/L (ref -5–5)
BASE EXCESS BLDA CALC-SCNC: 8 MMOL/L (ref -5–5)
BDY SITE: ABNORMAL
BODY TEMPERATURE: 37 C
BUN SERPL-MCNC: 21 MG/DL (ref 6–20)
BUN SERPL-MCNC: 22 MG/DL (ref 6–20)
BUN SERPL-MCNC: 23 MG/DL (ref 6–20)
BUN/CREAT SERPL: 14.6 (ref 7–25)
BUN/CREAT SERPL: 15.5 (ref 7–25)
BUN/CREAT SERPL: 16 (ref 7–25)
CA-I BLDA-SCNC: 0.97 MMOL/L (ref 1.2–1.32)
CA-I BLDA-SCNC: 0.99 MMOL/L (ref 1.2–1.32)
CA-I BLDA-SCNC: 1.02 MMOL/L (ref 1.2–1.32)
CA-I BLDA-SCNC: 1.07 MMOL/L (ref 1.2–1.32)
CA-I BLDA-SCNC: 1.12 MMOL/L (ref 1.2–1.32)
CA-I BLDA-SCNC: 1.23 MMOL/L (ref 1.2–1.32)
CA-I BLDA-SCNC: 1.31 MMOL/L (ref 1.2–1.32)
CA-I BLDA-SCNC: 1.35 MMOL/L (ref 1.2–1.32)
CA-I BLDA-SCNC: 1.36 MMOL/L (ref 1.2–1.32)
CA-I SERPL ISE-MCNC: 1.37 MMOL/L (ref 1.12–1.32)
CALCIUM SPEC-SCNC: 10.6 MG/DL (ref 8.6–10.5)
CALCIUM SPEC-SCNC: 9.1 MG/DL (ref 8.6–10.5)
CALCIUM SPEC-SCNC: 9.7 MG/DL (ref 8.6–10.5)
CHLORIDE SERPL-SCNC: 103 MMOL/L (ref 98–107)
CHLORIDE SERPL-SCNC: 103 MMOL/L (ref 98–107)
CHLORIDE SERPL-SCNC: 106 MMOL/L (ref 98–107)
CO2 BLDA-SCNC: 24.7 MMOL/L (ref 22–33)
CO2 BLDA-SCNC: 26 MMOL/L (ref 24–29)
CO2 BLDA-SCNC: 26 MMOL/L (ref 24–29)
CO2 BLDA-SCNC: 27 MMOL/L (ref 24–29)
CO2 BLDA-SCNC: 27.1 MMOL/L (ref 22–33)
CO2 BLDA-SCNC: 27.5 MMOL/L (ref 22–33)
CO2 BLDA-SCNC: 29 MMOL/L (ref 24–29)
CO2 BLDA-SCNC: 30 MMOL/L (ref 24–29)
CO2 BLDA-SCNC: 31 MMOL/L (ref 24–29)
CO2 BLDA-SCNC: 34 MMOL/L (ref 24–29)
CO2 SERPL-SCNC: 23 MMOL/L (ref 22–29)
CO2 SERPL-SCNC: 25 MMOL/L (ref 22–29)
CO2 SERPL-SCNC: 25 MMOL/L (ref 22–29)
COHGB MFR BLD: 0.5 % (ref 0–2)
COHGB MFR BLD: 0.6 % (ref 0–2)
COHGB MFR BLD: 0.8 % (ref 0–2)
CREAT SERPL-MCNC: 1.31 MG/DL (ref 0.76–1.27)
CREAT SERPL-MCNC: 1.42 MG/DL (ref 0.76–1.27)
CREAT SERPL-MCNC: 1.57 MG/DL (ref 0.76–1.27)
DEPRECATED RDW RBC AUTO: 42.2 FL (ref 37–54)
DEPRECATED RDW RBC AUTO: 42.7 FL (ref 37–54)
DEPRECATED RDW RBC AUTO: 43.5 FL (ref 37–54)
ERYTHROCYTE [DISTWIDTH] IN BLOOD BY AUTOMATED COUNT: 12.4 % (ref 12.3–15.4)
ERYTHROCYTE [DISTWIDTH] IN BLOOD BY AUTOMATED COUNT: 12.5 % (ref 12.3–15.4)
ERYTHROCYTE [DISTWIDTH] IN BLOOD BY AUTOMATED COUNT: 12.6 % (ref 12.3–15.4)
GFR SERPL CREATININE-BSD FRML MDRD: 45 ML/MIN/1.73
GFR SERPL CREATININE-BSD FRML MDRD: 51 ML/MIN/1.73
GFR SERPL CREATININE-BSD FRML MDRD: 56 ML/MIN/1.73
GLUCOSE BLDC GLUCOMTR-MCNC: 101 MG/DL (ref 70–130)
GLUCOSE BLDC GLUCOMTR-MCNC: 104 MG/DL (ref 70–130)
GLUCOSE BLDC GLUCOMTR-MCNC: 117 MG/DL (ref 70–130)
GLUCOSE BLDC GLUCOMTR-MCNC: 122 MG/DL (ref 70–130)
GLUCOSE BLDC GLUCOMTR-MCNC: 126 MG/DL (ref 70–130)
GLUCOSE BLDC GLUCOMTR-MCNC: 129 MG/DL (ref 70–130)
GLUCOSE BLDC GLUCOMTR-MCNC: 134 MG/DL (ref 70–130)
GLUCOSE BLDC GLUCOMTR-MCNC: 137 MG/DL (ref 70–130)
GLUCOSE BLDC GLUCOMTR-MCNC: 138 MG/DL (ref 70–130)
GLUCOSE BLDC GLUCOMTR-MCNC: 139 MG/DL (ref 70–130)
GLUCOSE BLDC GLUCOMTR-MCNC: 139 MG/DL (ref 70–130)
GLUCOSE BLDC GLUCOMTR-MCNC: 148 MG/DL (ref 70–130)
GLUCOSE BLDC GLUCOMTR-MCNC: 151 MG/DL (ref 70–130)
GLUCOSE BLDC GLUCOMTR-MCNC: 154 MG/DL (ref 70–130)
GLUCOSE BLDC GLUCOMTR-MCNC: 157 MG/DL (ref 70–130)
GLUCOSE BLDC GLUCOMTR-MCNC: 157 MG/DL (ref 70–130)
GLUCOSE BLDC GLUCOMTR-MCNC: 178 MG/DL (ref 70–130)
GLUCOSE BLDC GLUCOMTR-MCNC: 183 MG/DL (ref 70–130)
GLUCOSE BLDC GLUCOMTR-MCNC: 189 MG/DL (ref 70–130)
GLUCOSE BLDC GLUCOMTR-MCNC: 196 MG/DL (ref 70–130)
GLUCOSE BLDC GLUCOMTR-MCNC: 208 MG/DL (ref 70–130)
GLUCOSE BLDC GLUCOMTR-MCNC: 219 MG/DL (ref 70–130)
GLUCOSE BLDC GLUCOMTR-MCNC: 253 MG/DL (ref 70–130)
GLUCOSE SERPL-MCNC: 113 MG/DL (ref 65–99)
GLUCOSE SERPL-MCNC: 181 MG/DL (ref 65–99)
GLUCOSE SERPL-MCNC: 181 MG/DL (ref 65–99)
HCO3 BLDA-SCNC: 23 MMOL/L (ref 20–26)
HCO3 BLDA-SCNC: 24.7 MMOL/L (ref 22–26)
HCO3 BLDA-SCNC: 25 MMOL/L (ref 22–26)
HCO3 BLDA-SCNC: 25.9 MMOL/L (ref 20–26)
HCO3 BLDA-SCNC: 26 MMOL/L (ref 22–26)
HCO3 BLDA-SCNC: 26.1 MMOL/L (ref 20–26)
HCO3 BLDA-SCNC: 27.5 MMOL/L (ref 22–26)
HCO3 BLDA-SCNC: 27.8 MMOL/L (ref 22–26)
HCO3 BLDA-SCNC: 28.1 MMOL/L (ref 22–26)
HCO3 BLDA-SCNC: 28.4 MMOL/L (ref 22–26)
HCO3 BLDA-SCNC: 28.8 MMOL/L (ref 22–26)
HCO3 BLDA-SCNC: 29.1 MMOL/L (ref 22–26)
HCO3 BLDA-SCNC: 29.7 MMOL/L (ref 22–26)
HCO3 BLDA-SCNC: 32.7 MMOL/L (ref 22–26)
HCT VFR BLD AUTO: 38.5 % (ref 37.5–51)
HCT VFR BLD AUTO: 39.3 % (ref 37.5–51)
HCT VFR BLD AUTO: 39.7 % (ref 37.5–51)
HCT VFR BLD CALC: 38.8 %
HCT VFR BLD CALC: 41.3 %
HCT VFR BLD CALC: 41.4 %
HCT VFR BLDA CALC: 34 % (ref 38–51)
HCT VFR BLDA CALC: 34 % (ref 38–51)
HCT VFR BLDA CALC: 35 % (ref 38–51)
HCT VFR BLDA CALC: 36 % (ref 38–51)
HCT VFR BLDA CALC: 36 % (ref 38–51)
HCT VFR BLDA CALC: 37 % (ref 38–51)
HCT VFR BLDA CALC: 37 % (ref 38–51)
HCT VFR BLDA CALC: 40 % (ref 38–51)
HGB BLD-MCNC: 12.1 G/DL (ref 13–17.7)
HGB BLD-MCNC: 12.7 G/DL (ref 13–17.7)
HGB BLD-MCNC: 12.8 G/DL (ref 13–17.7)
HGB BLDA-MCNC: 11.6 G/DL (ref 12–17)
HGB BLDA-MCNC: 11.6 G/DL (ref 12–17)
HGB BLDA-MCNC: 11.9 G/DL (ref 12–17)
HGB BLDA-MCNC: 12.2 G/DL (ref 12–17)
HGB BLDA-MCNC: 12.2 G/DL (ref 12–17)
HGB BLDA-MCNC: 12.6 G/DL (ref 12–17)
HGB BLDA-MCNC: 12.6 G/DL (ref 12–17)
HGB BLDA-MCNC: 12.7 G/DL (ref 13.5–17.5)
HGB BLDA-MCNC: 13.5 G/DL (ref 13.5–17.5)
HGB BLDA-MCNC: 13.5 G/DL (ref 13.5–17.5)
HGB BLDA-MCNC: 13.6 G/DL (ref 12–17)
INHALED O2 CONCENTRATION: 100 %
INHALED O2 CONCENTRATION: 40 %
INHALED O2 CONCENTRATION: 40 %
MAGNESIUM SERPL-MCNC: 1.9 MG/DL (ref 1.6–2.6)
MAGNESIUM SERPL-MCNC: 1.9 MG/DL (ref 1.6–2.6)
MAGNESIUM SERPL-MCNC: 2.1 MG/DL (ref 1.6–2.6)
MCH RBC QN AUTO: 29.7 PG (ref 26.6–33)
MCH RBC QN AUTO: 30 PG (ref 26.6–33)
MCH RBC QN AUTO: 30.3 PG (ref 26.6–33)
MCHC RBC AUTO-ENTMCNC: 31.4 G/DL (ref 31.5–35.7)
MCHC RBC AUTO-ENTMCNC: 32.2 G/DL (ref 31.5–35.7)
MCHC RBC AUTO-ENTMCNC: 32.3 G/DL (ref 31.5–35.7)
MCV RBC AUTO: 92.9 FL (ref 79–97)
MCV RBC AUTO: 94.1 FL (ref 79–97)
MCV RBC AUTO: 94.6 FL (ref 79–97)
METHGB BLD QL: 0.6 % (ref 0–1.5)
METHGB BLD QL: 0.6 % (ref 0–1.5)
METHGB BLD QL: 0.9 % (ref 0–1.5)
MODALITY: ABNORMAL
NOTE: ABNORMAL
OXYHGB MFR BLDV: 93.6 % (ref 94–99)
OXYHGB MFR BLDV: 93.8 % (ref 94–99)
OXYHGB MFR BLDV: 98.8 % (ref 94–99)
PCO2 BLDA: 38 MM HG (ref 35–45)
PCO2 BLDA: 41.7 MM HG (ref 35–45)
PCO2 BLDA: 42.4 MM HG (ref 35–45)
PCO2 BLDA: 43.1 MM HG (ref 35–45)
PCO2 BLDA: 45.4 MM HG (ref 35–45)
PCO2 BLDA: 46.1 MM HG (ref 35–45)
PCO2 BLDA: 46.6 MM HG (ref 35–45)
PCO2 BLDA: 46.7 MM HG (ref 35–45)
PCO2 BLDA: 47.4 MM HG (ref 35–45)
PCO2 BLDA: 47.8 MM HG (ref 35–45)
PCO2 BLDA: 47.9 MM HG (ref 35–45)
PCO2 BLDA: 48.9 MM HG (ref 35–45)
PCO2 BLDA: 49.4 MM HG (ref 35–45)
PCO2 BLDA: 55.6 MM HG (ref 35–45)
PCO2 TEMP ADJ BLD: 41.7 MM HG (ref 35–48)
PCO2 TEMP ADJ BLD: 47.4 MM HG (ref 35–48)
PCO2 TEMP ADJ BLD: 55.6 MM HG (ref 35–48)
PEEP RESPIRATORY: 5 CM[H2O]
PH BLDA: 7.22 PH UNITS (ref 7.35–7.45)
PH BLDA: 7.35 PH UNITS (ref 7.35–7.45)
PH BLDA: 7.35 PH UNITS (ref 7.35–7.6)
PH BLDA: 7.37 PH UNITS (ref 7.35–7.6)
PH BLDA: 7.38 PH UNITS (ref 7.35–7.6)
PH BLDA: 7.39 PH UNITS (ref 7.35–7.6)
PH BLDA: 7.39 PH UNITS (ref 7.35–7.6)
PH BLDA: 7.4 PH UNITS (ref 7.35–7.45)
PH BLDA: 7.42 PH UNITS (ref 7.35–7.6)
PH BLDA: 7.43 PH UNITS (ref 7.35–7.6)
PH BLDA: 7.5 PH UNITS (ref 7.35–7.6)
PH, TEMP CORRECTED: 7.22 PH UNITS
PH, TEMP CORRECTED: 7.35 PH UNITS
PH, TEMP CORRECTED: 7.4 PH UNITS
PHOSPHATE SERPL-MCNC: 3.3 MG/DL (ref 2.5–4.5)
PHOSPHATE SERPL-MCNC: 5.1 MG/DL (ref 2.5–4.5)
PHOSPHATE SERPL-MCNC: 5.3 MG/DL (ref 2.5–4.5)
PLATELET # BLD AUTO: 216 10*3/MM3 (ref 140–450)
PLATELET # BLD AUTO: 225 10*3/MM3 (ref 140–450)
PLATELET # BLD AUTO: 249 10*3/MM3 (ref 140–450)
PMV BLD AUTO: 10 FL (ref 6–12)
PMV BLD AUTO: 10 FL (ref 6–12)
PMV BLD AUTO: 10.3 FL (ref 6–12)
PO2 BLDA: 251 MM HG (ref 83–108)
PO2 BLDA: 260 MMHG (ref 80–105)
PO2 BLDA: 282 MMHG (ref 80–105)
PO2 BLDA: 330 MMHG (ref 80–105)
PO2 BLDA: 336 MMHG (ref 80–105)
PO2 BLDA: 351 MMHG (ref 80–105)
PO2 BLDA: 360 MMHG (ref 80–105)
PO2 BLDA: 399 MMHG (ref 80–105)
PO2 BLDA: 461 MMHG (ref 80–105)
PO2 BLDA: 475 MMHG (ref 80–105)
PO2 BLDA: 476 MMHG (ref 80–105)
PO2 BLDA: 49 MMHG (ref 80–105)
PO2 BLDA: 79 MM HG (ref 83–108)
PO2 BLDA: 87.5 MM HG (ref 83–108)
PO2 TEMP ADJ BLD: 251 MM HG (ref 83–108)
PO2 TEMP ADJ BLD: 79 MM HG (ref 83–108)
PO2 TEMP ADJ BLD: 87.5 MM HG (ref 83–108)
POTASSIUM BLDA-SCNC: 3.7 MMOL/L (ref 3.5–4.9)
POTASSIUM BLDA-SCNC: 3.8 MMOL/L (ref 3.5–4.9)
POTASSIUM BLDA-SCNC: 4 MMOL/L (ref 3.5–4.9)
POTASSIUM BLDA-SCNC: 4.1 MMOL/L (ref 3.5–4.9)
POTASSIUM BLDA-SCNC: 5.3 MMOL/L (ref 3.5–4.9)
POTASSIUM BLDA-SCNC: 5.7 MMOL/L (ref 3.5–4.9)
POTASSIUM BLDA-SCNC: 5.8 MMOL/L (ref 3.5–4.9)
POTASSIUM BLDA-SCNC: 6.5 MMOL/L (ref 3.5–4.9)
POTASSIUM BLDA-SCNC: 6.6 MMOL/L (ref 3.5–4.9)
POTASSIUM SERPL-SCNC: 4 MMOL/L (ref 3.5–5.2)
POTASSIUM SERPL-SCNC: 4.2 MMOL/L (ref 3.5–5.2)
POTASSIUM SERPL-SCNC: 4.2 MMOL/L (ref 3.5–5.2)
POTASSIUM SERPL-SCNC: 4.3 MMOL/L (ref 3.5–5.2)
PSV: 10 CMH2O
PSV: 10 CMH2O
QT INTERVAL: 410 MS
QTC INTERVAL: 455 MS
RBC # BLD AUTO: 4.07 10*6/MM3 (ref 4.14–5.8)
RBC # BLD AUTO: 4.22 10*6/MM3 (ref 4.14–5.8)
RBC # BLD AUTO: 4.23 10*6/MM3 (ref 4.14–5.8)
RH BLD: POSITIVE
SAO2 % BLDA: 100 % (ref 95–98)
SAO2 % BLDA: 83 % (ref 95–98)
SET MECH RESP RATE: 12
SODIUM BLD-SCNC: 135 MMOL/L (ref 138–146)
SODIUM BLD-SCNC: 136 MMOL/L (ref 138–146)
SODIUM BLD-SCNC: 136 MMOL/L (ref 138–146)
SODIUM BLD-SCNC: 137 MMOL/L (ref 138–146)
SODIUM BLD-SCNC: 140 MMOL/L (ref 138–146)
SODIUM BLD-SCNC: 140 MMOL/L (ref 138–146)
SODIUM BLD-SCNC: 141 MMOL/L (ref 138–146)
SODIUM BLD-SCNC: 143 MMOL/L (ref 138–146)
SODIUM SERPL-SCNC: 140 MMOL/L (ref 136–145)
SODIUM SERPL-SCNC: 141 MMOL/L (ref 136–145)
SODIUM SERPL-SCNC: 143 MMOL/L (ref 136–145)
TOTAL RATE: 12 BREATHS/MINUTE
TOTAL RATE: 15 BREATHS/MINUTE
VENTILATOR MODE: ABNORMAL
VENTILATOR MODE: ABNORMAL
VT ON VENT VENT: 700 ML
WBC # BLD AUTO: 17.02 10*3/MM3 (ref 3.4–10.8)
WBC # BLD AUTO: 24.06 10*3/MM3 (ref 3.4–10.8)
WBC # BLD AUTO: 27.29 10*3/MM3 (ref 3.4–10.8)

## 2021-03-11 PROCEDURE — 99233 SBSQ HOSP IP/OBS HIGH 50: CPT | Performed by: INTERNAL MEDICINE

## 2021-03-11 PROCEDURE — 80069 RENAL FUNCTION PANEL: CPT | Performed by: THORACIC SURGERY (CARDIOTHORACIC VASCULAR SURGERY)

## 2021-03-11 PROCEDURE — 99232 SBSQ HOSP IP/OBS MODERATE 35: CPT | Performed by: INTERNAL MEDICINE

## 2021-03-11 PROCEDURE — 82803 BLOOD GASES ANY COMBINATION: CPT

## 2021-03-11 PROCEDURE — 85027 COMPLETE CBC AUTOMATED: CPT | Performed by: PHYSICIAN ASSISTANT

## 2021-03-11 PROCEDURE — 25010000002 MIDAZOLAM PER 1 MG: Performed by: ANESTHESIOLOGY

## 2021-03-11 PROCEDURE — 25010000002 ALBUMIN HUMAN 5% PER 50 ML: Performed by: ANESTHESIOLOGY

## 2021-03-11 PROCEDURE — 33508 ENDOSCOPIC VEIN HARVEST: CPT | Performed by: PHYSICIAN ASSISTANT

## 2021-03-11 PROCEDURE — A4648 IMPLANTABLE TISSUE MARKER: HCPCS | Performed by: THORACIC SURGERY (CARDIOTHORACIC VASCULAR SURGERY)

## 2021-03-11 PROCEDURE — 83735 ASSAY OF MAGNESIUM: CPT | Performed by: THORACIC SURGERY (CARDIOTHORACIC VASCULAR SURGERY)

## 2021-03-11 PROCEDURE — 85014 HEMATOCRIT: CPT

## 2021-03-11 PROCEDURE — 85027 COMPLETE CBC AUTOMATED: CPT | Performed by: THORACIC SURGERY (CARDIOTHORACIC VASCULAR SURGERY)

## 2021-03-11 PROCEDURE — C1894 INTRO/SHEATH, NON-LASER: HCPCS | Performed by: THORACIC SURGERY (CARDIOTHORACIC VASCULAR SURGERY)

## 2021-03-11 PROCEDURE — P9041 ALBUMIN (HUMAN),5%, 50ML: HCPCS | Performed by: PHYSICIAN ASSISTANT

## 2021-03-11 PROCEDURE — 25010000002 PROPOFOL 10 MG/ML EMULSION: Performed by: ANESTHESIOLOGY

## 2021-03-11 PROCEDURE — 25010000002 MORPHINE PER 10 MG: Performed by: THORACIC SURGERY (CARDIOTHORACIC VASCULAR SURGERY)

## 2021-03-11 PROCEDURE — 25010000002 CEFUROXIME: Performed by: PHYSICIAN ASSISTANT

## 2021-03-11 PROCEDURE — 86900 BLOOD TYPING SEROLOGIC ABO: CPT

## 2021-03-11 PROCEDURE — 85347 COAGULATION TIME ACTIVATED: CPT

## 2021-03-11 PROCEDURE — 25010000002 HEPARIN (PORCINE) PER 1000 UNITS: Performed by: THORACIC SURGERY (CARDIOTHORACIC VASCULAR SURGERY)

## 2021-03-11 PROCEDURE — 5A1221Z PERFORMANCE OF CARDIAC OUTPUT, CONTINUOUS: ICD-10-PCS | Performed by: THORACIC SURGERY (CARDIOTHORACIC VASCULAR SURGERY)

## 2021-03-11 PROCEDURE — 84132 ASSAY OF SERUM POTASSIUM: CPT

## 2021-03-11 PROCEDURE — 86901 BLOOD TYPING SEROLOGIC RH(D): CPT

## 2021-03-11 PROCEDURE — 82805 BLOOD GASES W/O2 SATURATION: CPT

## 2021-03-11 PROCEDURE — 25010000002 PAPAVERINE PER 60 MG: Performed by: THORACIC SURGERY (CARDIOTHORACIC VASCULAR SURGERY)

## 2021-03-11 PROCEDURE — 93005 ELECTROCARDIOGRAM TRACING: CPT | Performed by: PHYSICIAN ASSISTANT

## 2021-03-11 PROCEDURE — 84295 ASSAY OF SERUM SODIUM: CPT

## 2021-03-11 PROCEDURE — 94799 UNLISTED PULMONARY SVC/PX: CPT

## 2021-03-11 PROCEDURE — 33533 CABG ARTERIAL SINGLE: CPT | Performed by: THORACIC SURGERY (CARDIOTHORACIC VASCULAR SURGERY)

## 2021-03-11 PROCEDURE — 25010000002 ALBUMIN HUMAN 5% PER 50 ML: Performed by: PHYSICIAN ASSISTANT

## 2021-03-11 PROCEDURE — 82330 ASSAY OF CALCIUM: CPT | Performed by: PHYSICIAN ASSISTANT

## 2021-03-11 PROCEDURE — 71045 X-RAY EXAM CHEST 1 VIEW: CPT

## 2021-03-11 PROCEDURE — 25010000002 FENTANYL CITRATE (PF) 100 MCG/2ML SOLUTION: Performed by: ANESTHESIOLOGY

## 2021-03-11 PROCEDURE — 25010000003 MAGNESIUM SULFATE 4 GM/100ML SOLUTION: Performed by: THORACIC SURGERY (CARDIOTHORACIC VASCULAR SURGERY)

## 2021-03-11 PROCEDURE — 25010000002 AMIODARONE PER 30 MG: Performed by: ANESTHESIOLOGY

## 2021-03-11 PROCEDURE — P9041 ALBUMIN (HUMAN),5%, 50ML: HCPCS | Performed by: ANESTHESIOLOGY

## 2021-03-11 PROCEDURE — 83050 HGB METHEMOGLOBIN QUAN: CPT

## 2021-03-11 PROCEDURE — 85730 THROMBOPLASTIN TIME PARTIAL: CPT | Performed by: PHYSICIAN ASSISTANT

## 2021-03-11 PROCEDURE — 82330 ASSAY OF CALCIUM: CPT

## 2021-03-11 PROCEDURE — 02100Z9 BYPASS CORONARY ARTERY, ONE ARTERY FROM LEFT INTERNAL MAMMARY, OPEN APPROACH: ICD-10-PCS | Performed by: THORACIC SURGERY (CARDIOTHORACIC VASCULAR SURGERY)

## 2021-03-11 PROCEDURE — 06BP4ZZ EXCISION OF RIGHT SAPHENOUS VEIN, PERCUTANEOUS ENDOSCOPIC APPROACH: ICD-10-PCS | Performed by: THORACIC SURGERY (CARDIOTHORACIC VASCULAR SURGERY)

## 2021-03-11 PROCEDURE — C1751 CATH, INF, PER/CENT/MIDLINE: HCPCS | Performed by: THORACIC SURGERY (CARDIOTHORACIC VASCULAR SURGERY)

## 2021-03-11 PROCEDURE — C1751 CATH, INF, PER/CENT/MIDLINE: HCPCS | Performed by: ANESTHESIOLOGY

## 2021-03-11 PROCEDURE — 93318 ECHO TRANSESOPHAGEAL INTRAOP: CPT | Performed by: ANESTHESIOLOGY

## 2021-03-11 PROCEDURE — 25010000002 HEPARIN (PORCINE) PER 1000 UNITS: Performed by: ANESTHESIOLOGY

## 2021-03-11 PROCEDURE — 25010000002 PROPOFOL 10 MG/ML EMULSION: Performed by: THORACIC SURGERY (CARDIOTHORACIC VASCULAR SURGERY)

## 2021-03-11 PROCEDURE — 25010000003 CEFUROXIME SODIUM 1.5 G RECONSTITUTED SOLUTION: Performed by: PHYSICIAN ASSISTANT

## 2021-03-11 PROCEDURE — 93318 ECHO TRANSESOPHAGEAL INTRAOP: CPT

## 2021-03-11 PROCEDURE — 33518 CABG ARTERY-VEIN TWO: CPT | Performed by: PHYSICIAN ASSISTANT

## 2021-03-11 PROCEDURE — 84132 ASSAY OF SERUM POTASSIUM: CPT | Performed by: ANESTHESIOLOGY

## 2021-03-11 PROCEDURE — 82375 ASSAY CARBOXYHB QUANT: CPT

## 2021-03-11 PROCEDURE — 021109W BYPASS CORONARY ARTERY, TWO ARTERIES FROM AORTA WITH AUTOLOGOUS VENOUS TISSUE, OPEN APPROACH: ICD-10-PCS | Performed by: THORACIC SURGERY (CARDIOTHORACIC VASCULAR SURGERY)

## 2021-03-11 PROCEDURE — 33533 CABG ARTERIAL SINGLE: CPT | Performed by: PHYSICIAN ASSISTANT

## 2021-03-11 PROCEDURE — 80069 RENAL FUNCTION PANEL: CPT | Performed by: PHYSICIAN ASSISTANT

## 2021-03-11 PROCEDURE — 33518 CABG ARTERY-VEIN TWO: CPT | Performed by: THORACIC SURGERY (CARDIOTHORACIC VASCULAR SURGERY)

## 2021-03-11 PROCEDURE — 25010000002 PROTAMINE SULFATE PER 10 MG: Performed by: ANESTHESIOLOGY

## 2021-03-11 PROCEDURE — 33508 ENDOSCOPIC VEIN HARVEST: CPT | Performed by: THORACIC SURGERY (CARDIOTHORACIC VASCULAR SURGERY)

## 2021-03-11 PROCEDURE — 83735 ASSAY OF MAGNESIUM: CPT | Performed by: PHYSICIAN ASSISTANT

## 2021-03-11 PROCEDURE — 25010000002 FENTANYL CITRATE (PF) 100 MCG/2ML SOLUTION: Performed by: THORACIC SURGERY (CARDIOTHORACIC VASCULAR SURGERY)

## 2021-03-11 PROCEDURE — 82947 ASSAY GLUCOSE BLOOD QUANT: CPT

## 2021-03-11 PROCEDURE — 25810000003 DEXTROSE 5 % WITH KCL 20 MEQ 20-5 MEQ/L-% SOLUTION: Performed by: PHYSICIAN ASSISTANT

## 2021-03-11 PROCEDURE — 25010000002 EPINEPHRINE PER 0.1 MG: Performed by: ANESTHESIOLOGY

## 2021-03-11 PROCEDURE — 63710000001 INSULIN REGULAR HUMAN PER 5 UNITS: Performed by: ANESTHESIOLOGY

## 2021-03-11 DEVICE — DISK-SHAPED STYLE, SILICONE (1 PER STERILE PKG)
Type: IMPLANTABLE DEVICE | Site: HEART | Status: FUNCTIONAL
Brand: SCANLAN® RADIOMARK® GRAFT MARKERS

## 2021-03-11 DEVICE — CLIP LIGAT VASC HORIZON TI SM/WD RD 6CT: Type: IMPLANTABLE DEVICE | Site: HEART | Status: FUNCTIONAL

## 2021-03-11 RX ORDER — SODIUM CHLORIDE 0.9 % (FLUSH) 0.9 %
30 SYRINGE (ML) INJECTION ONCE AS NEEDED
Status: COMPLETED | OUTPATIENT
Start: 2021-03-11 | End: 2021-03-15

## 2021-03-11 RX ORDER — AMINOCAPROIC ACID 250 MG/ML
INJECTION, SOLUTION INTRAVENOUS AS NEEDED
Status: DISCONTINUED | OUTPATIENT
Start: 2021-03-11 | End: 2021-03-11 | Stop reason: SURG

## 2021-03-11 RX ORDER — NITROGLYCERIN 20 MG/100ML
INJECTION INTRAVENOUS CONTINUOUS PRN
Status: DISCONTINUED | OUTPATIENT
Start: 2021-03-11 | End: 2021-03-11 | Stop reason: SURG

## 2021-03-11 RX ORDER — CHLORHEXIDINE GLUCONATE 500 MG/1
1 CLOTH TOPICAL EVERY 12 HOURS PRN
Status: DISCONTINUED | OUTPATIENT
Start: 2021-03-11 | End: 2021-03-11 | Stop reason: HOSPADM

## 2021-03-11 RX ORDER — MIDAZOLAM HYDROCHLORIDE 1 MG/ML
INJECTION INTRAMUSCULAR; INTRAVENOUS AS NEEDED
Status: DISCONTINUED | OUTPATIENT
Start: 2021-03-11 | End: 2021-03-11 | Stop reason: SURG

## 2021-03-11 RX ORDER — NITROGLYCERIN 0.4 MG/1
0.4 TABLET SUBLINGUAL
Status: DISCONTINUED | OUTPATIENT
Start: 2021-03-11 | End: 2021-03-11 | Stop reason: HOSPADM

## 2021-03-11 RX ORDER — FAMOTIDINE 20 MG/1
20 TABLET, FILM COATED ORAL ONCE
Status: COMPLETED | OUTPATIENT
Start: 2021-03-11 | End: 2021-03-11

## 2021-03-11 RX ORDER — ETOMIDATE 2 MG/ML
INJECTION INTRAVENOUS AS NEEDED
Status: DISCONTINUED | OUTPATIENT
Start: 2021-03-11 | End: 2021-03-11 | Stop reason: SURG

## 2021-03-11 RX ORDER — FAMOTIDINE 10 MG/ML
20 INJECTION, SOLUTION INTRAVENOUS EVERY 12 HOURS SCHEDULED
Status: DISCONTINUED | OUTPATIENT
Start: 2021-03-11 | End: 2021-03-12

## 2021-03-11 RX ORDER — PAPAVERINE HYDROCHLORIDE 30 MG/ML
INJECTION INTRAMUSCULAR; INTRAVENOUS AS NEEDED
Status: DISCONTINUED | OUTPATIENT
Start: 2021-03-11 | End: 2021-03-11 | Stop reason: HOSPADM

## 2021-03-11 RX ORDER — MIDAZOLAM HYDROCHLORIDE 1 MG/ML
2 INJECTION INTRAMUSCULAR; INTRAVENOUS
Status: DISCONTINUED | OUTPATIENT
Start: 2021-03-11 | End: 2021-03-11 | Stop reason: HOSPADM

## 2021-03-11 RX ORDER — PROTAMINE SULFATE 10 MG/ML
50 INJECTION, SOLUTION INTRAVENOUS ONCE
Status: DISCONTINUED | OUTPATIENT
Start: 2021-03-11 | End: 2021-03-12

## 2021-03-11 RX ORDER — ACETAMINOPHEN 325 MG/1
650 TABLET ORAL EVERY 4 HOURS PRN
Status: DISCONTINUED | OUTPATIENT
Start: 2021-03-11 | End: 2021-03-16 | Stop reason: HOSPADM

## 2021-03-11 RX ORDER — DOPAMINE HYDROCHLORIDE 160 MG/100ML
2-20 INJECTION, SOLUTION INTRAVENOUS CONTINUOUS PRN
Status: DISCONTINUED | OUTPATIENT
Start: 2021-03-11 | End: 2021-03-12

## 2021-03-11 RX ORDER — POTASSIUM CHLORIDE 750 MG/1
40 CAPSULE, EXTENDED RELEASE ORAL AS NEEDED
Status: DISCONTINUED | OUTPATIENT
Start: 2021-03-11 | End: 2021-03-16 | Stop reason: HOSPADM

## 2021-03-11 RX ORDER — POTASSIUM CHLORIDE 29.8 MG/ML
20 INJECTION INTRAVENOUS
Status: DISCONTINUED | OUTPATIENT
Start: 2021-03-11 | End: 2021-03-16 | Stop reason: HOSPADM

## 2021-03-11 RX ORDER — ATORVASTATIN CALCIUM 40 MG/1
40 TABLET, FILM COATED ORAL NIGHTLY
Status: DISCONTINUED | OUTPATIENT
Start: 2021-03-11 | End: 2021-03-11

## 2021-03-11 RX ORDER — HEPARIN SODIUM 1000 [USP'U]/ML
INJECTION, SOLUTION INTRAVENOUS; SUBCUTANEOUS AS NEEDED
Status: DISCONTINUED | OUTPATIENT
Start: 2021-03-11 | End: 2021-03-11 | Stop reason: SURG

## 2021-03-11 RX ORDER — CHLORHEXIDINE GLUCONATE 0.12 MG/ML
15 RINSE ORAL ONCE
Status: COMPLETED | OUTPATIENT
Start: 2021-03-11 | End: 2021-03-11

## 2021-03-11 RX ORDER — MIDAZOLAM HYDROCHLORIDE 1 MG/ML
1 INJECTION INTRAMUSCULAR; INTRAVENOUS
Status: DISCONTINUED | OUTPATIENT
Start: 2021-03-11 | End: 2021-03-11 | Stop reason: HOSPADM

## 2021-03-11 RX ORDER — SODIUM CHLORIDE 9 MG/ML
INJECTION, SOLUTION INTRAVENOUS CONTINUOUS PRN
Status: DISCONTINUED | OUTPATIENT
Start: 2021-03-11 | End: 2021-03-11 | Stop reason: SURG

## 2021-03-11 RX ORDER — SUFENTANIL CITRATE 50 UG/ML
INJECTION EPIDURAL; INTRAVENOUS AS NEEDED
Status: DISCONTINUED | OUTPATIENT
Start: 2021-03-11 | End: 2021-03-11 | Stop reason: SURG

## 2021-03-11 RX ORDER — DEXMEDETOMIDINE HYDROCHLORIDE 4 UG/ML
.2-1.5 INJECTION, SOLUTION INTRAVENOUS CONTINUOUS PRN
Status: DISCONTINUED | OUTPATIENT
Start: 2021-03-11 | End: 2021-03-14

## 2021-03-11 RX ORDER — NITROGLYCERIN 20 MG/100ML
5-200 INJECTION INTRAVENOUS CONTINUOUS PRN
Status: DISCONTINUED | OUTPATIENT
Start: 2021-03-11 | End: 2021-03-14 | Stop reason: ALTCHOICE

## 2021-03-11 RX ORDER — SODIUM CHLORIDE 0.9 % (FLUSH) 0.9 %
10 SYRINGE (ML) INJECTION AS NEEDED
Status: DISCONTINUED | OUTPATIENT
Start: 2021-03-11 | End: 2021-03-11 | Stop reason: HOSPADM

## 2021-03-11 RX ORDER — PROPOFOL 10 MG/ML
VIAL (ML) INTRAVENOUS CONTINUOUS PRN
Status: DISCONTINUED | OUTPATIENT
Start: 2021-03-11 | End: 2021-03-11 | Stop reason: SURG

## 2021-03-11 RX ORDER — ASPIRIN 325 MG
325 TABLET, DELAYED RELEASE (ENTERIC COATED) ORAL DAILY
Status: DISCONTINUED | OUTPATIENT
Start: 2021-03-12 | End: 2021-03-16 | Stop reason: HOSPADM

## 2021-03-11 RX ORDER — ALBUMIN, HUMAN INJ 5% 5 %
500 SOLUTION INTRAVENOUS AS NEEDED
Status: COMPLETED | OUTPATIENT
Start: 2021-03-11 | End: 2021-03-12

## 2021-03-11 RX ORDER — AMOXICILLIN 250 MG
2 CAPSULE ORAL 2 TIMES DAILY
Status: DISCONTINUED | OUTPATIENT
Start: 2021-03-11 | End: 2021-03-16 | Stop reason: HOSPADM

## 2021-03-11 RX ORDER — ACETAMINOPHEN 325 MG/1
650 TABLET ORAL EVERY 4 HOURS PRN
Status: DISCONTINUED | OUTPATIENT
Start: 2021-03-11 | End: 2021-03-11 | Stop reason: HOSPADM

## 2021-03-11 RX ORDER — ASPIRIN 325 MG
325 TABLET ORAL ONCE
Status: DISCONTINUED | OUTPATIENT
Start: 2021-03-11 | End: 2021-03-11

## 2021-03-11 RX ORDER — CALCIUM CHLORIDE 100 MG/ML
INJECTION INTRAVENOUS; INTRAVENTRICULAR AS NEEDED
Status: DISCONTINUED | OUTPATIENT
Start: 2021-03-11 | End: 2021-03-11 | Stop reason: SURG

## 2021-03-11 RX ORDER — OXYCODONE HYDROCHLORIDE AND ACETAMINOPHEN 5; 325 MG/1; MG/1
2 TABLET ORAL EVERY 4 HOURS PRN
Status: DISCONTINUED | OUTPATIENT
Start: 2021-03-11 | End: 2021-03-12

## 2021-03-11 RX ORDER — NALOXONE HCL 0.4 MG/ML
0.4 VIAL (ML) INJECTION
Status: DISCONTINUED | OUTPATIENT
Start: 2021-03-11 | End: 2021-03-12

## 2021-03-11 RX ORDER — AMIODARONE HYDROCHLORIDE 50 MG/ML
INJECTION, SOLUTION INTRAVENOUS AS NEEDED
Status: DISCONTINUED | OUTPATIENT
Start: 2021-03-11 | End: 2021-03-11 | Stop reason: SURG

## 2021-03-11 RX ORDER — EPHEDRINE SULFATE 50 MG/ML
INJECTION, SOLUTION INTRAVENOUS AS NEEDED
Status: DISCONTINUED | OUTPATIENT
Start: 2021-03-11 | End: 2021-03-11 | Stop reason: SURG

## 2021-03-11 RX ORDER — METOPROLOL TARTRATE 5 MG/5ML
2.5 INJECTION INTRAVENOUS EVERY 6 HOURS
Status: DISCONTINUED | OUTPATIENT
Start: 2021-03-11 | End: 2021-03-11

## 2021-03-11 RX ORDER — FAMOTIDINE 10 MG/ML
20 INJECTION, SOLUTION INTRAVENOUS ONCE
Status: DISCONTINUED | OUTPATIENT
Start: 2021-03-11 | End: 2021-03-11 | Stop reason: HOSPADM

## 2021-03-11 RX ORDER — MORPHINE SULFATE 4 MG/ML
2 INJECTION, SOLUTION INTRAMUSCULAR; INTRAVENOUS
Status: DISCONTINUED | OUTPATIENT
Start: 2021-03-11 | End: 2021-03-14

## 2021-03-11 RX ORDER — ALBUMIN, HUMAN INJ 5% 5 %
SOLUTION INTRAVENOUS CONTINUOUS PRN
Status: DISCONTINUED | OUTPATIENT
Start: 2021-03-11 | End: 2021-03-11 | Stop reason: SURG

## 2021-03-11 RX ORDER — POTASSIUM CHLORIDE, DEXTROSE MONOHYDRATE 150; 5 MG/100ML; G/100ML
30 INJECTION, SOLUTION INTRAVENOUS CONTINUOUS
Status: DISCONTINUED | OUTPATIENT
Start: 2021-03-11 | End: 2021-03-12

## 2021-03-11 RX ORDER — MEPERIDINE HYDROCHLORIDE 50 MG/ML
25 INJECTION INTRAMUSCULAR; INTRAVENOUS; SUBCUTANEOUS EVERY 4 HOURS PRN
Status: DISCONTINUED | OUTPATIENT
Start: 2021-03-11 | End: 2021-03-11

## 2021-03-11 RX ORDER — ONDANSETRON 2 MG/ML
4 INJECTION INTRAMUSCULAR; INTRAVENOUS EVERY 6 HOURS PRN
Status: DISCONTINUED | OUTPATIENT
Start: 2021-03-11 | End: 2021-03-16 | Stop reason: HOSPADM

## 2021-03-11 RX ORDER — FENTANYL CITRATE 50 UG/ML
25 INJECTION, SOLUTION INTRAMUSCULAR; INTRAVENOUS
Status: DISCONTINUED | OUTPATIENT
Start: 2021-03-11 | End: 2021-03-12

## 2021-03-11 RX ORDER — CHLORHEXIDINE GLUCONATE 0.12 MG/ML
15 RINSE ORAL EVERY 12 HOURS SCHEDULED
Status: DISCONTINUED | OUTPATIENT
Start: 2021-03-11 | End: 2021-03-11 | Stop reason: SDUPTHER

## 2021-03-11 RX ORDER — MAGNESIUM SULFATE HEPTAHYDRATE 40 MG/ML
2 INJECTION, SOLUTION INTRAVENOUS AS NEEDED
Status: DISCONTINUED | OUTPATIENT
Start: 2021-03-11 | End: 2021-03-16 | Stop reason: HOSPADM

## 2021-03-11 RX ORDER — HYDROCODONE BITARTRATE AND ACETAMINOPHEN 7.5; 325 MG/1; MG/1
1 TABLET ORAL EVERY 4 HOURS PRN
Status: DISCONTINUED | OUTPATIENT
Start: 2021-03-11 | End: 2021-03-16 | Stop reason: HOSPADM

## 2021-03-11 RX ORDER — LIDOCAINE HYDROCHLORIDE 10 MG/ML
0.5 INJECTION, SOLUTION EPIDURAL; INFILTRATION; INTRACAUDAL; PERINEURAL ONCE AS NEEDED
Status: COMPLETED | OUTPATIENT
Start: 2021-03-11 | End: 2021-03-11

## 2021-03-11 RX ORDER — FENTANYL CITRATE 50 UG/ML
INJECTION, SOLUTION INTRAMUSCULAR; INTRAVENOUS AS NEEDED
Status: DISCONTINUED | OUTPATIENT
Start: 2021-03-11 | End: 2021-03-11 | Stop reason: SURG

## 2021-03-11 RX ORDER — POTASSIUM CHLORIDE 1.5 G/1.77G
40 POWDER, FOR SOLUTION ORAL AS NEEDED
Status: DISCONTINUED | OUTPATIENT
Start: 2021-03-11 | End: 2021-03-16 | Stop reason: HOSPADM

## 2021-03-11 RX ORDER — SODIUM CHLORIDE 9 MG/ML
30 INJECTION, SOLUTION INTRAVENOUS CONTINUOUS PRN
Status: DISCONTINUED | OUTPATIENT
Start: 2021-03-11 | End: 2021-03-14

## 2021-03-11 RX ORDER — BUPIVACAINE HCL/0.9 % NACL/PF 0.125 %
PLASTIC BAG, INJECTION (ML) EPIDURAL AS NEEDED
Status: DISCONTINUED | OUTPATIENT
Start: 2021-03-11 | End: 2021-03-11 | Stop reason: SURG

## 2021-03-11 RX ORDER — SODIUM CHLORIDE 0.9 % (FLUSH) 0.9 %
10 SYRINGE (ML) INJECTION EVERY 12 HOURS SCHEDULED
Status: DISCONTINUED | OUTPATIENT
Start: 2021-03-11 | End: 2021-03-11 | Stop reason: HOSPADM

## 2021-03-11 RX ORDER — DOBUTAMINE HYDROCHLORIDE 100 MG/100ML
2-20 INJECTION INTRAVENOUS CONTINUOUS PRN
Status: DISCONTINUED | OUTPATIENT
Start: 2021-03-11 | End: 2021-03-12

## 2021-03-11 RX ORDER — ROCURONIUM BROMIDE 10 MG/ML
INJECTION, SOLUTION INTRAVENOUS AS NEEDED
Status: DISCONTINUED | OUTPATIENT
Start: 2021-03-11 | End: 2021-03-11 | Stop reason: SURG

## 2021-03-11 RX ORDER — ALBUTEROL SULFATE 2.5 MG/3ML
2.5 SOLUTION RESPIRATORY (INHALATION) EVERY 4 HOURS PRN
Status: DISCONTINUED | OUTPATIENT
Start: 2021-03-11 | End: 2021-03-12

## 2021-03-11 RX ORDER — PHENYLEPHRINE HCL IN 0.9% NACL 0.5 MG/5ML
.5-3 SYRINGE (ML) INTRAVENOUS CONTINUOUS PRN
Status: DISCONTINUED | OUTPATIENT
Start: 2021-03-11 | End: 2021-03-14

## 2021-03-11 RX ORDER — ALBUTEROL SULFATE 2.5 MG/3ML
2.5 SOLUTION RESPIRATORY (INHALATION) EVERY 4 HOURS PRN
Status: DISCONTINUED | OUTPATIENT
Start: 2021-03-11 | End: 2021-03-11 | Stop reason: SDUPTHER

## 2021-03-11 RX ORDER — NOREPINEPHRINE BIT/0.9 % NACL 8 MG/250ML
.02-.3 INFUSION BOTTLE (ML) INTRAVENOUS CONTINUOUS PRN
Status: DISCONTINUED | OUTPATIENT
Start: 2021-03-11 | End: 2021-03-14

## 2021-03-11 RX ORDER — PROTAMINE SULFATE 10 MG/ML
INJECTION, SOLUTION INTRAVENOUS AS NEEDED
Status: DISCONTINUED | OUTPATIENT
Start: 2021-03-11 | End: 2021-03-11 | Stop reason: SURG

## 2021-03-11 RX ORDER — MAGNESIUM SULFATE HEPTAHYDRATE 40 MG/ML
4 INJECTION, SOLUTION INTRAVENOUS AS NEEDED
Status: DISCONTINUED | OUTPATIENT
Start: 2021-03-11 | End: 2021-03-16 | Stop reason: HOSPADM

## 2021-03-11 RX ORDER — ATORVASTATIN CALCIUM 40 MG/1
80 TABLET, FILM COATED ORAL NIGHTLY
Status: DISCONTINUED | OUTPATIENT
Start: 2021-03-11 | End: 2021-03-16 | Stop reason: HOSPADM

## 2021-03-11 RX ORDER — CHLORHEXIDINE GLUCONATE 0.12 MG/ML
15 RINSE ORAL EVERY 12 HOURS SCHEDULED
Status: DISCONTINUED | OUTPATIENT
Start: 2021-03-11 | End: 2021-03-12

## 2021-03-11 RX ORDER — SODIUM CHLORIDE, SODIUM LACTATE, POTASSIUM CHLORIDE, CALCIUM CHLORIDE 600; 310; 30; 20 MG/100ML; MG/100ML; MG/100ML; MG/100ML
9 INJECTION, SOLUTION INTRAVENOUS CONTINUOUS
Status: DISCONTINUED | OUTPATIENT
Start: 2021-03-11 | End: 2021-03-14

## 2021-03-11 RX ORDER — MAGNESIUM HYDROXIDE 1200 MG/15ML
LIQUID ORAL AS NEEDED
Status: DISCONTINUED | OUTPATIENT
Start: 2021-03-11 | End: 2021-03-11 | Stop reason: HOSPADM

## 2021-03-11 RX ADMIN — SUFENTANIL CITRATE 50 MCG: 50 INJECTION, SOLUTION EPIDURAL; INTRAVENOUS at 10:53

## 2021-03-11 RX ADMIN — FENTANYL CITRATE 25 MCG: 50 INJECTION, SOLUTION INTRAMUSCULAR; INTRAVENOUS at 17:25

## 2021-03-11 RX ADMIN — SODIUM CHLORIDE 1 UNITS/HR: 9 INJECTION, SOLUTION INTRAVENOUS at 07:37

## 2021-03-11 RX ADMIN — Medication 100 MCG: at 08:32

## 2021-03-11 RX ADMIN — HEPARIN SODIUM 37000 UNITS: 1000 INJECTION, SOLUTION INTRAVENOUS; SUBCUTANEOUS at 09:04

## 2021-03-11 RX ADMIN — SUFENTANIL CITRATE 50 MCG: 50 INJECTION, SOLUTION EPIDURAL; INTRAVENOUS at 09:28

## 2021-03-11 RX ADMIN — CEFUROXIME 1.5 G: 1.5 INJECTION, POWDER, FOR SOLUTION INTRAVENOUS at 07:36

## 2021-03-11 RX ADMIN — Medication 100 MCG: at 08:21

## 2021-03-11 RX ADMIN — ACETAMINOPHEN 650 MG: 325 TABLET ORAL at 22:52

## 2021-03-11 RX ADMIN — AMINOCAPROIC ACID 10 G: 250 INJECTION, SOLUTION INTRAVENOUS at 07:38

## 2021-03-11 RX ADMIN — MORPHINE SULFATE 2 MG: 4 INJECTION, SOLUTION INTRAMUSCULAR; INTRAVENOUS at 22:08

## 2021-03-11 RX ADMIN — Medication 100 MCG: at 08:15

## 2021-03-11 RX ADMIN — SODIUM CHLORIDE, POTASSIUM CHLORIDE, SODIUM LACTATE AND CALCIUM CHLORIDE 9 ML/HR: 600; 310; 30; 20 INJECTION, SOLUTION INTRAVENOUS at 06:15

## 2021-03-11 RX ADMIN — ROCURONIUM BROMIDE 5 MG: 10 INJECTION INTRAVENOUS at 07:10

## 2021-03-11 RX ADMIN — FAMOTIDINE 20 MG: 10 INJECTION, SOLUTION INTRAVENOUS at 20:05

## 2021-03-11 RX ADMIN — Medication 100 MCG: at 07:34

## 2021-03-11 RX ADMIN — ALBUMIN HUMAN: 0.05 INJECTION, SOLUTION INTRAVENOUS at 12:07

## 2021-03-11 RX ADMIN — Medication 200 MCG: at 09:07

## 2021-03-11 RX ADMIN — PROPOFOL 25 MCG/KG/MIN: 10 INJECTION, EMULSION INTRAVENOUS at 11:57

## 2021-03-11 RX ADMIN — ROCURONIUM BROMIDE 95 MG: 10 INJECTION INTRAVENOUS at 07:11

## 2021-03-11 RX ADMIN — MIDAZOLAM HYDROCHLORIDE 2 MG: 1 INJECTION, SOLUTION INTRAMUSCULAR; INTRAVENOUS at 06:37

## 2021-03-11 RX ADMIN — EPHEDRINE SULFATE 15 MG: 50 INJECTION, SOLUTION INTRAVENOUS at 11:04

## 2021-03-11 RX ADMIN — SODIUM BICARBONATE 50 MEQ: 84 INJECTION, SOLUTION INTRAVENOUS at 17:32

## 2021-03-11 RX ADMIN — NITROGLYCERIN 0.25 MCG/KG/MIN: 20 INJECTION INTRAVENOUS at 11:20

## 2021-03-11 RX ADMIN — DEXMEDETOMIDINE HYDROCHLORIDE 1.3 MCG/KG/HR: 400 INJECTION, SOLUTION INTRAVENOUS at 23:19

## 2021-03-11 RX ADMIN — FAMOTIDINE 20 MG: 20 TABLET, FILM COATED ORAL at 06:15

## 2021-03-11 RX ADMIN — Medication 100 MCG: at 07:44

## 2021-03-11 RX ADMIN — CALCIUM CHLORIDE 1 G: 100 INJECTION INTRAVENOUS; INTRAVENTRICULAR at 11:24

## 2021-03-11 RX ADMIN — FENTANYL CITRATE 100 MCG: 50 INJECTION, SOLUTION INTRAMUSCULAR; INTRAVENOUS at 07:07

## 2021-03-11 RX ADMIN — MUPIROCIN 1 APPLICATION: 20 OINTMENT TOPICAL at 06:15

## 2021-03-11 RX ADMIN — DOCUSATE SODIUM 50MG AND SENNOSIDES 8.6MG 2 TABLET: 8.6; 5 TABLET, FILM COATED ORAL at 20:06

## 2021-03-11 RX ADMIN — PROPOFOL 50 MCG/KG/MIN: 10 INJECTION, EMULSION INTRAVENOUS at 14:55

## 2021-03-11 RX ADMIN — ATORVASTATIN CALCIUM 80 MG: 40 TABLET, FILM COATED ORAL at 20:06

## 2021-03-11 RX ADMIN — AMINOCAPROIC ACID 10 G: 250 INJECTION, SOLUTION INTRAVENOUS at 11:25

## 2021-03-11 RX ADMIN — SUFENTANIL CITRATE 25 MCG: 50 INJECTION, SOLUTION EPIDURAL; INTRAVENOUS at 07:15

## 2021-03-11 RX ADMIN — ETOMIDATE 32.82 MG: 2 INJECTION, SOLUTION INTRAVENOUS at 07:12

## 2021-03-11 RX ADMIN — CEFUROXIME SODIUM 1.5 G: 1.5 INJECTION, POWDER, FOR SOLUTION INTRAVENOUS at 20:05

## 2021-03-11 RX ADMIN — DEXMEDETOMIDINE HYDROCHLORIDE 1.5 MCG/KG/HR: 400 INJECTION, SOLUTION INTRAVENOUS at 20:50

## 2021-03-11 RX ADMIN — SUFENTANIL CITRATE 50 MCG: 50 INJECTION, SOLUTION EPIDURAL; INTRAVENOUS at 08:07

## 2021-03-11 RX ADMIN — SUFENTANIL CITRATE 25 MCG: 50 INJECTION, SOLUTION EPIDURAL; INTRAVENOUS at 08:00

## 2021-03-11 RX ADMIN — Medication 100 MCG: at 07:25

## 2021-03-11 RX ADMIN — LIDOCAINE HYDROCHLORIDE 0.5 ML: 10 INJECTION, SOLUTION EPIDURAL; INFILTRATION; INTRACAUDAL; PERINEURAL at 06:15

## 2021-03-11 RX ADMIN — CHLORHEXIDINE GLUCONATE 0.12% ORAL RINSE 15 ML: 1.2 LIQUID ORAL at 06:15

## 2021-03-11 RX ADMIN — DEXMEDETOMIDINE HYDROCHLORIDE 1.5 MCG/KG/HR: 400 INJECTION, SOLUTION INTRAVENOUS at 18:20

## 2021-03-11 RX ADMIN — DEXMEDETOMIDINE HYDROCHLORIDE 0.2 MCG/KG/HR: 400 INJECTION, SOLUTION INTRAVENOUS at 14:30

## 2021-03-11 RX ADMIN — OXYCODONE AND ACETAMINOPHEN 2 TABLET: 5; 325 TABLET ORAL at 17:45

## 2021-03-11 RX ADMIN — AMIODARONE HYDROCHLORIDE 150 MG: 50 INJECTION, SOLUTION INTRAVENOUS at 11:12

## 2021-03-11 RX ADMIN — SUFENTANIL CITRATE 50 MCG: 50 INJECTION, SOLUTION EPIDURAL; INTRAVENOUS at 08:05

## 2021-03-11 RX ADMIN — EPINEPHRINE 0.1 MCG/KG/MIN: 1 INJECTION, SOLUTION, CONCENTRATE INTRAVENOUS at 11:04

## 2021-03-11 RX ADMIN — CHLORHEXIDINE GLUCONATE 15 ML: 1.2 SOLUTION ORAL at 20:06

## 2021-03-11 RX ADMIN — SODIUM CHLORIDE: 9 INJECTION, SOLUTION INTRAVENOUS at 07:06

## 2021-03-11 RX ADMIN — PROTAMINE SULFATE 300 MG: 10 INJECTION, SOLUTION INTRAVENOUS at 11:25

## 2021-03-11 RX ADMIN — HYDROCODONE BITARTRATE AND ACETAMINOPHEN 1 TABLET: 7.5; 325 TABLET ORAL at 21:35

## 2021-03-11 RX ADMIN — POTASSIUM CHLORIDE AND DEXTROSE MONOHYDRATE 30 ML/HR: 150; 5 INJECTION, SOLUTION INTRAVENOUS at 12:49

## 2021-03-11 RX ADMIN — ALBUMIN HUMAN 500 ML: 0.05 INJECTION, SOLUTION INTRAVENOUS at 14:00

## 2021-03-11 RX ADMIN — MIDAZOLAM HYDROCHLORIDE 2 MG: 1 INJECTION, SOLUTION INTRAMUSCULAR; INTRAVENOUS at 07:20

## 2021-03-11 RX ADMIN — FENTANYL CITRATE 25 MCG: 50 INJECTION, SOLUTION INTRAMUSCULAR; INTRAVENOUS at 14:23

## 2021-03-11 RX ADMIN — CEFUROXIME 1.5 G: 1.5 INJECTION, POWDER, FOR SOLUTION INTRAVENOUS at 11:23

## 2021-03-11 RX ADMIN — MAGNESIUM SULFATE HEPTAHYDRATE 4 G: 40 INJECTION, SOLUTION INTRAVENOUS at 22:37

## 2021-03-11 RX ADMIN — Medication 100 MCG: at 07:50

## 2021-03-11 RX ADMIN — Medication 100 MCG: at 08:56

## 2021-03-11 RX ADMIN — Medication 100 MCG: at 08:46

## 2021-03-11 NOTE — ANESTHESIA PREPROCEDURE EVALUATION
Anesthesia Evaluation     Patient summary reviewed and Nursing notes reviewed   NPO Solid Status: > 8 hours  NPO Liquid Status: > 8 hours           Airway   Mallampati: II  TM distance: >3 FB  Neck ROM: full  No difficulty expected  Dental - normal exam     Pulmonary     breath sounds clear to auscultation  Cardiovascular   Exercise tolerance: poor (<4 METS)    Rhythm: regular  Rate: normal        Neuro/Psych  GI/Hepatic/Renal/Endo      Musculoskeletal     Abdominal    Substance History      OB/GYN          Other                        Anesthesia Plan    ASA 4     general     intravenous induction     Anesthetic plan, all risks, benefits, and alternatives have been provided, discussed and informed consent has been obtained with: patient.    A line pa cath deonte  Post op ventilation  Return to icu post op

## 2021-03-11 NOTE — PLAN OF CARE
Goal Outcome Evaluation:        Outcome Summary: to unit approx 1300 on epi, diprivan and insulin. precedex started during shift. diprivan weaned off. pt very agitated. appears to have fast hiccups. rigid and shaking. mari updated and to bedside. fentanyl given and precedex increased. attempted wean again and was successful.

## 2021-03-11 NOTE — OP NOTE
DATE OF PROCEDURE: 3/11/2021     PREOPERATIVE DIAGNOSES:  1. Multivessel coronary artery disease status post stenting  2. Unstable angina  3. Hypertension  4. Hyperlipidemia  5. Diabetes mellitus  6. Remote tobacco abuse     POSTOPERATIVE DIAGNOSES:    1. Multivessel coronary artery disease status post stenting  2. Unstable angina  3. Hypertension  4. Hyperlipidemia  5. Diabetes mellitus  6. Remote tobacco abuse     PROCEDURES PERFORMED:    1. Coronary artery bypass graft x 3  2. Endoscopic vein harvesting (Right greater saphenous vein).       SURGEON: Panfilo Hamm MD       ASSISTANTS:    1. Jarrett Gutierrez PA was responsible for performing the following activities: Retraction, Suction, Closing, Placing Dressing and Harvesting of Vessels and their skilled assistance was necessary for the success of this case.  2. Emely Joy PA-C was responsible for performing the following activities: Closing and their skilled assistance was necessary for the success of this case.    Circulator: Nataliya Tan RN; Kya Petit RN; Joya Chong RN  Perfusionist: Joshua Hill  Scrub Person: Nicole Harrington; Makayla Henry  Nursing Assistant: Libby Julian; Rosaura Bland    ANESTHESIA: General endotracheal anesthesia with Dr. Edil William MD     ESTIMATED BLOOD LOSS: 500 mL.       CROSSCLAMP TIME: 83 minutes.       TOTAL CARDIOPULMONARY BYPASS TIME: 109 minutes.       DISTAL BYPASS TARGETS:    1. LIMA to LAD.    2. Greater saphenous vein to RCA  3. Greater saphenous vein to OM1    INDICATIONS:  60-year-old  male with a history of hypertension, hyperlipidemia, coronary artery disease status post stenting, remote tobacco abuse and newly diagnosed diabetes who presented with unstable angina in the setting of multivessel coronary artery disease.  The patient was felt to be a reasonable candidate for surgical revascularization. The risks and benefits of surgery were  discussed with the patient including pain, bleeding, infection, renal failure, stroke and death. The patient understood these risks and wished to proceed with surgery.      DESCRIPTION OF PROCEDURE: The patient was taken to the operating room and placed under general endotracheal anesthesia. A central line, [place]ala-Emily catheter, radial arterial line, and Felix catheter were placed. The patient was prepped and draped in the usual sterile fashion and a timeout was performed, including the patient's name, procedure, consent, beta blockade administration, and antibiotics were verified.    The right greater saphenous vein was harvested from the groin to below the knee using EVH technique. Subcutaneous tissues were closed with a running 3-0 Vicryl suture and 4-0 Monocryl subcuticular stitch. Simultaneously, a median sternotomy incision was made and electrocautery was utilized to gain access to the sternum. A midline sternotomy was performed after lung desufflation and hemostasis was achieved with electrocautery.    Attention was turned to the left internal mammary artery, which was taken down using electrocautery and small clips. Dissection was performed proximally to the subclavian vein and distally to the bifurcation. The bifurcation was ligated with 2 large clips to each branch and sharply divided. This revealed excellent flow within the mammary artery which was ligated distally using small clips and irrigated with papaverine solution and placed in a soaked Ray-Amber sponge in the left pleural space. The pericardium was opened and stay sutures were placed to create a pericardial well. Next, 3-0 Prolene sutures were placed in the ascending aorta and systemic heparin was administered. Additional cannulation sutures were placed in the right atrial appendage, ascending aorta, and right atrium. After verification of satisfactory activated clotting time, the arterial cannula was placed and connected to the cardiopulmonary bypass  circuit after being de-aired. The line was tested and a wrap was performed. The venous cannula was inserted followed by antegrade and retrograde cardioplegia lines. The vein was inspected and found to be of appropriate caliber and quality for bypass grafting. A slit within the pericardial well along the cephalad portion was created for appropriate transition of the mammary pedicle into the mediastinum. Cardiopulmonary bypass was initiated and the patient was allowed to drift in temperature and distal bypass targets were verified. Bypass flow was dropped and the aortic crossclamp was applied. Cardioplegia was administered in an antegrade fashion with immediate cessation of cardiac activity. There was a marginal septal temperature response. The root vent suction was turned on high and additional cardioplegia was given via retrograde with an excellent septal temperature response.  The right coronary artery distribution was thoroughly inspected.    The distal right coronary artery was 1.75 mm in diameter and an arteriotomy was made on the distal portion of this vessel and extended proximally and distally. An impressive lipid laden plaque was present in the vessel down its entire length despite angiogram findings.  The vessel was probed and no focal stenosis was present distally.  An end-to-side anastomosis was performed with running 7-0 Prolene suture and tied down. Cardioplegia was given down the anastomosis via hand injection with no evidence of leak and good blood flow. Verification of vein length was obtained by filling the heart and the vein graft was trimmed to the appropriate length. The first obtuse marginal was heavily diseased on palpation and grossly with lipid laden plaques.  The mid to distal vessel improved, but measured 1 mm in diameter.  An arteriotomy was made on the distal portion of this vessel and extended proximally and distally. An end-to-side anastomosis was performed with running 7-0 Prolene  suture and tied down. Cardioplegia was given down the anastomosis via hand injection with no evidence of leak and good blood flow. Verification of vein length was obtained by filling the heart and the vein graft was trimmed to the appropriate length. The LAD was inspected and found to have diffuse lipid laden disease. An arteriotomy was made on the mid LAD and extended proximally and distally on this 1.75 mm diameter vessel. The internal mammary artery was then prepared for grafting by ligating the 2 venous branches along the pedicle using small clips. The mammary artery was trimmed proximal to the bifurcation and beveled for grafting. An end-to-side anastomosis with an 8-0 Prolene suture was constructed and tied down. The bulldog clamp was removed and there was excellent flow within the vessel and adequate filling of the LAD. The underlying mammary fascia was secured to the epicardium using two 6-0 Prolene sutures. Two aortotomies were then made on the proximal ascending aorta and end-to-side proximal anastomoses were carried out using 6-0 Prolene suture and labeled with a radiopaque washers. A hot shot was given down the ascending aorta after bulldog clamps were applied to the vein grafts. The veins were de-aired and the patient was placed in steep Trendelenburg position. The root vent was turned on high suction and cardiopulmonary bypass flow was turned down with crossclamp subsequently removed. Bypass flows were returned to normal and the bulldog clamps were removed. The heart returned to spontaneous sinus rhythm. The proximal and distal anastomoses were then inspected and found to be hemostatic.   The patient required a very slow wean from bypass given his preoperative wall motion abnormalities.  The heart looked improved on echocardiogram with no vasoactive drugs at the end of the case.  Decannulation was successfully carried out. All cannulation sites were reinforced with additional 4-0 Prolene suture and  inspected for hemostasis. Doppler examination of bypass grafts revealed excellent flow within the mammary and vein grafts.  Ventricular pacing wires were placed and secured using 0 silk suture. Three chest tubes were then placed within the left and right pleural spaces and mediastinum. These were secured using a 0 Ethibond suture. The sternum was reapproximated with #7 stainless steel wire and the linea alba was closed with a running 0 Vicryl suture. Subcutaneous tissues were closed with a 2-0 Vicryl suture and the inferior aspect of the incision was closed with additional interrupted 2-0 Vicryl sutures in the dermal layer. The skin was reapproximated with a 4-0 Monocryl subcuticular stitch and overlying skin glue was applied to the incision. Gauze and tape were applied to the chest tube sites and the patient was subsequently transported to the cardiac ICU in stable condition, intubated.

## 2021-03-11 NOTE — INTERVAL H&P NOTE
[facility] Pre-op    Full history and physical note from office is attached.    /87 (BP Location: Left arm, Patient Position: Lying)   Pulse 86   Temp 99.6 °F (37.6 °C) (Temporal)   Resp 18   SpO2 97%     Immunizations:  Influenza:  No  Pneumococcal:  No  Tetanus:  No    LAB Results:  Lab Results   Component Value Date    WBC 12.47 (H) 03/09/2021    HGB 15.4 03/09/2021    HCT 47.5 03/09/2021    MCV 91.9 03/09/2021     03/09/2021    NEUTROABS 8.19 (H) 03/09/2021    GLUCOSE 117 (H) 03/09/2021    BUN 13 03/09/2021    CREATININE 0.88 03/09/2021    EGFRIFNONA 89 03/09/2021     03/09/2021    K 4.4 03/09/2021     03/09/2021    CO2 28.0 03/09/2021    MG 2.0 03/09/2021    CALCIUM 9.9 03/09/2021    ALBUMIN 4.80 03/09/2021    AST 18 03/09/2021    ALT 17 03/09/2021    BILITOT 0.6 03/09/2021    PTT 32.4 03/09/2021    INR 0.97 03/09/2021       Cancer Staging (if applicable)  Cancer Patient: __ yes __no __unknown__N/A; If yes, clinical stage T:__ N:__M:__, stage group or __N/A      Impression: Unstable angina      Plan: CORONARY ARTERY BYPASS GRAFTING      Shanell Vazquez, APRN   3/11/2021   06:37 EST

## 2021-03-11 NOTE — ANESTHESIA POSTPROCEDURE EVALUATION
Patient: Sony Marks    Procedure Summary     Date: 03/11/21 Room / Location:  [facility]  /  [facility]    Anesthesia Start: 0706 Anesthesia Stop: 1254    Procedure: MEDIAN STERNOTOMY, CORONARY ARTERY BYPASS GRAFTING x3, UTILIZATION OF LEFT INTERAL MAMMARY, EVH OF RIGHT GREATER SAPHENOUS VEIN. GERMAN PER ANESTHESIA (N/A Chest) Diagnosis:       Unstable angina (CMS/HCC)      (Unstable angina (CMS/HCC) [I20.0])    Surgeons: Panfilo Hamm MD Provider: Edil William MD    Anesthesia Type: general ASA Status: 4          Anesthesia Type: general    Vitals  Vitals Value Taken Time   BP     Temp     Pulse     Resp     SpO2 99 % 03/11/21 1253   Vitals shown include unvalidated device data.        Post Anesthesia Care and Evaluation    Patient location during evaluation: ICU  Patient participation: complete - patient cannot participate  Level of consciousness: obtunded/minimal responses  Pain score: 0  Pain management: adequate  Airway patency: patent  Anesthetic complications: No anesthetic complications  PONV Status: none  Cardiovascular status: acceptable  Respiratory status: intubated and acceptable  Hydration status: acceptable

## 2021-03-11 NOTE — BRIEF OP NOTE
CORONARY ARTERY BYPASS GRAFTING  Progress Note    Sony Marks  3/11/2021    Pre-op Diagnosis:   Unstable angina (CMS/HCC) [I20.0]       Post-Op Diagnosis Codes:     * Unstable angina (CMS/HCC) [I20.0]    Procedure/CPT® Codes:    Procedure(s):  MEDIAN STERNOTOMY, CORONARY ARTERY BYPASS GRAFTING x3, UTILIZATION OF LEFT INTERAL MAMMARY, EVH OF RIGHT GREATER SAPHENOUS VEIN. GERMAN PER ANESTHESIA    Surgeon(s):  Panfilo Hamm MD    Anesthesia: General    Staff:   Circulator: Nataliya Tan, PETER; Kya Petit, PETER; Joya Chong RN  Perfusionist: Joshua Hill  Scrub Person: Makayla Henry  Nursing Assistant: Libby Julian; Rosaura Bland  Assistant: Emely Joy PA-C; Jarrett Gutierrez PA  Assistant: Emely Joy PA-C; Jarrett Gutierrez PA      Estimated Blood Loss: 500 mL    Urine Voided: 200 mL    Specimens:                None          Drains:   Urethral Catheter Temperature probe;Straight-tip;Silicone 16 Fr. (Active)       Y Chest Tube 1 and 2 1 Mediastinal 32 Fr. 2 Mediastinal 32 Fr. (Active)       Y Chest Tube 3 and 4 3 Mediastinal 32 Fr. 4 Mediastinal 32 Fr. (Active)       Findings: LIMA--LAD, GSV-->RCA, OM1    Complications: None    Assistant: Emely Joy PA-C; Jarrett Gutierrez PA  was responsible for performing the following activities: Retraction, Suction, Closing, Placing Dressing and Harvesting of Vessels and their skilled assistance was necessary for the success of this case.    Panfilo Hamm MD     Date: 3/11/2021  Time: 12:01 EST

## 2021-03-11 NOTE — ANESTHESIA PROCEDURE NOTES
Airway  Urgency: elective    Date/Time: 3/11/2021 7:12 AM  End Time:3/11/2021 7:12 AM  Airway not difficult    General Information and Staff    Patient location during procedure: OR    Indications and Patient Condition  Indications for airway management: airway protection    Preoxygenated: yes  MILS not maintained throughout  Mask difficulty assessment: 1 - vent by mask    Final Airway Details  Final airway type: endotracheal airway      Successful airway: ETT  Cuffed: yes   Successful intubation technique: direct laryngoscopy  Endotracheal tube insertion site: oral  Blade: Jorge  Blade size: 3  ETT size (mm): 7.5  Cormack-Lehane Classification: grade IIb - view of arytenoids or posterior of glottis only  Placement verified by: chest auscultation and capnometry   Cuff volume (mL): 6  Measured from: lips  ETT/EBT  to lips (cm): 22  Number of attempts at approach: 1  Assessment: lips, teeth, and gum same as pre-op and atraumatic intubation    Additional Comments  Negative epigastric sounds, Breath sound equal bilaterally with symmetric chest rise and fall

## 2021-03-11 NOTE — ANESTHESIA PROCEDURE NOTES
Arterial Line    Pre-sedation assessment completed: 3/11/2021 6:30 AM    Patient reassessed immediately prior to procedure    Patient location during procedure: pre-op  Start time: 3/11/2021 6:30 AM  Stop Time:3/11/2021 6:45 AM       Line placed for hemodynamic monitoring.  Performed By   Anesthesiologist: Edil William MD  Preanesthetic Checklist  Completed: patient identified, IV checked, site marked, risks and benefits discussed, surgical consent, monitors and equipment checked, pre-op evaluation and timeout performed  Arterial Line Prep   Sterile Tech: cap, gloves and sterile barriers  Prep: ChloraPrep  Patient monitoring: blood pressure monitoring, continuous pulse oximetry and EKG  Arterial Line Procedure   Laterality:left  Location:  radial artery  Catheter size: 20 G   Guidance: palpation technique  Number of attempts: 1  Successful placement: yes  Post Assessment   Dressing Type: line sutured, occlusive dressing applied, secured with tape and wrist guard applied.   Complications no  Circ/Move/Sens Assessment: normal and unchanged.   Patient Tolerance: patient tolerated the procedure well with no apparent complications

## 2021-03-11 NOTE — PROGRESS NOTES
Cardiology Progress  [facility] Cardiology      Reason for consultation:  · CABG    Requesting provider: Panfilo Hamm MD  Consulting provider: Sourav Shah MD  Primary cardiologist: Nick Johnson MD    IDENTIFICATION: 60-year-old male from [facility]      Active Hospital Problems    Diagnosis  POA   • **Coronary artery disease involving native coronary artery of native heart with unstable angina pectoris (CMS/[facility]) [I25.110]  Yes     Priority: High     · Cardiac catheterization for anterior STEMI (2011): Status post PCI of the proximal LAD.  · Cardiac catheterization for unstable angina (3/8/2021): Severe 3-vessel CAD  · CABG by Panfilo Hamm (3/11/2021): LIMA to LAD, SVG to distal RCA/OM     • Type 2 diabetes mellitus, without long-term current use of insulin (CMS/[facility]) [E11.9]  Yes     Priority: Medium   • Hypertension [I10]  Yes   • Hyperlipidemia LDL goal <70 [E78.5]  Yes   • Ischemic cardiomyopathy [I25.5]  Yes     · Echo (3/8/2021): LVEF 40%.  No significant valve abnormality                60-year-old gentleman with history of previous anterior MI who presented to Dr. Johnson's office in February 2021 with new onset anginal symptoms.  The patient was scheduled for outpatient stress testing but presented to [facility] on 3/8/2021 with crescendo anginal symptoms.  He underwent cardiac catheterization which revealed severe 3-vessel CAD (LAD, LCx, RCA).  An echocardiogram was performed and showed EF 40% without significant valvular abnormality.    The patient underwent bypass surgery earlier today with a LIMA to the LAD and SVG to distal RCA/OM branch of the left circumflex.  Thus far, his postoperative course has been uneventful.    No Known Allergies    Prior to Admission medications    Medication Sig Start Date End Date Taking? Authorizing Provider   aspirin 81 MG EC tablet Take 1 tablet by mouth Daily. 2/23/21  Yes Sree Johnson MD   atorvastatin (LIPITOR) 80 MG tablet Take 1  tablet by mouth Daily. 2/23/21  Yes Sree Johnson MD   carvedilol (COREG) 12.5 MG tablet Take 0.5 tablets by mouth 2 (Two) Times a Day With Meals. 2/23/21  Yes Sree Johnson MD   isosorbide mononitrate (IMDUR) 60 MG 24 hr tablet Take 0.5 tablets by mouth Daily. 3/8/21  Yes Sree Johnson MD   lisinopril (PRINIVIL,ZESTRIL) 20 MG tablet Take 1 tablet by mouth Daily.  Patient taking differently: Take 20 mg by mouth Every Night. 2/23/21  Yes Sree Johnson MD   metFORMIN (Glucophage) 500 MG tablet Take 1 tablet by mouth 2 (Two) Times a Day With Meals. 3/11/21   Sree Johnson MD   nitroglycerin (NITROSTAT) 0.4 MG SL tablet 1 under the tongue as needed for angina, may repeat q5mins for up three doses  Patient taking differently: Place 0.4 mg under the tongue Every 5 (Five) Minutes As Needed. 1 under the tongue as needed for angina, may repeat q5mins for up three doses 2/23/21   Sree Johnson MD       Past Medical History:   Diagnosis Date   • Coronary artery disease    • Heart attack (CMS/[facility]) 01/2011   • Hyperlipidemia    • Hypertension    • Type 2 diabetes mellitus (CMS/[facility])        Past Surgical History:   Procedure Laterality Date   • CARDIAC CATHETERIZATION     • CARDIAC CATHETERIZATION N/A 3/8/2021    Procedure: Coronary angiography;  Surgeon: Sree Johnson MD;  Location:  [facility];  Service: Cardiology;  Laterality: N/A;   • CARDIAC CATHETERIZATION N/A 3/8/2021    Procedure: Left Heart Cath;  Surgeon: Sree Johnson MD;  Location:  [facility];  Service: Cardiology;  Laterality: N/A;   • COLONOSCOPY  1990   • CORONARY ANGIOPLASTY WITH STENT PLACEMENT  01/2011       Family History   Problem Relation Age of Onset   • Alzheimer's disease Mother    • Bone cancer Mother    • Coronary artery disease Father    • Heart attack Father    • Diabetes Father    • Hypertension Father    • Lung disease Father        Social History     Tobacco Use   Smoking  Status Former Smoker   • Years: 7.00   • Types: Cigars   Smokeless Tobacco Never Used   Tobacco Comment    smoked 3 cigars a week;        Social History     Substance and Sexual Activity   Alcohol Use Yes    Comment: rarely          Review of Systems:   Intubated and sedated         Vital Sign Min/Max for last 24 hours  Temp  Min: 99.6 °F (37.6 °C)  Max: 99.6 °F (37.6 °C)   BP  Min: 141/87  Max: 141/87   Pulse  Min: 86  Max: 86   Resp  Min: 18  Max: 18   SpO2  Min: 97 %  Max: 97 %   No data recorded      Intake/Output Summary (Last 24 hours) at 3/11/2021 1322  Last data filed at 3/11/2021 1300  Gross per 24 hour   Intake 1000 ml   Output 1560 ml   Net -560 ml             Constitutional:       Interventions: Sedated and intubated.   Eyes:      General: No scleral icterus.     Pupils: Pupils are equal, round, and reactive to light.   HENT:      Head: Normocephalic and atraumatic.   Neck:      Thyroid: No thyromegaly.      Vascular: No carotid bruit or JVD.   Pulmonary:      Effort: Intubated.      Breath sounds: Normal breath sounds.   Cardiovascular:      Normal rate. Regular rhythm.      Murmurs: There is no murmur.      No gallop.   Abdominal:      Palpations: Abdomen is soft. There is no abdominal mass.      Tenderness: There is no abdominal tenderness.   Musculoskeletal:      Extremities: No clubbing present.Skin:     General: Skin is warm and dry. There is no cyanosis.          Tele: Sinus    DATA REVIEW:    EKG (3/11/2021): Sinus at 74 bpm.  Anterior septal infarct.  Baseline artifact      ECHO (3/8/2021): LVEF 40% with anterior hypokinesis.  No significant valve abnormality      Results from last 7 days   Lab Units 03/09/21  1619 03/08/21  0844   SODIUM mmol/L 141 138   POTASSIUM mmol/L 4.4 4.2   CHLORIDE mmol/L 101 103   BUN mg/dL 13 21*   CREATININE mg/dL 0.88 1.09   MAGNESIUM mg/dL 2.0  --          Results from last 7 days   Lab Units 03/11/21  1215 03/11/21  1145 03/11/21  1120 03/09/21  1619  03/08/21  0844 03/08/21 0844   WBC 10*3/mm3  --   --   --  12.47*  --  11.35*   HEMOGLOBIN g/dL  --   --   --  15.4  --  15.1   HEMOGLOBIN, POC g/dL 11.9* 11.6* 12.6  --    < >  --    HEMATOCRIT %  --   --   --  47.5  --  46.2   HEMATOCRIT POC % 35* 34* 37*  --    < >  --    PLATELETS 10*3/mm3  --   --   --  376  --  345    < > = values in this interval not displayed.     Lab Results   Component Value Date    HGBA1C 6.60 (H) 03/08/2021     Lab Results   Component Value Date    CHOL 137 03/08/2021    TRIG 88 03/08/2021    HDL 49 03/08/2021    LDL 71 03/08/2021    AST 18 03/09/2021    ALT 17 03/09/2021              Coronary disease with unstable angina   · status post CABG, 3/11/2021  · Continue aspirin, beta-blocker, and statin    Chronic systolic heart failure/ischemic cardiomyopathy  · LVEF 40%  · We will transition to metoprolol succinate 25 mg daily once taking oral and hemodynamically stable  · Start Entresto 24-26 mg once BP stable  · Farxiga at discharge for diabetes and CHF    Hyperlipidemia with goal LDL <70  · Controlled  · Continue atorvastatin 80 mg daily    Type 2 diabetes mellitus  · Controlled  · Consider SGLT2 inhibitor therapy at discharge for DM/CHF             · Routine postoperative care  · Increase atorvastatin back to 80 mg daily (home dose)      Electronically signed by David Shah IV, MD, 03/11/21, 1:14 PM EST.

## 2021-03-11 NOTE — ANESTHESIA PROCEDURE NOTES
Central Line    Pre-sedation assessment completed: 3/11/2021 7:15 AM    Patient reassessed immediately prior to procedure    Patient location during procedure: OR  Start time: 3/11/2021 7:15 AM  Stop Time:3/11/2021 7:36 AM  Indications: vascular access  Staff  Anesthesiologist: dEil William MD  Preanesthetic Checklist  Completed: patient identified, IV checked, site marked, risks and benefits discussed, surgical consent, monitors and equipment checked, pre-op evaluation and timeout performed  Central Line Prep  Sterile Tech:cap, gloves, gown, mask and sterile barriers  Prep: chloraprep  Patient monitoring: blood pressure monitoring, continuous pulse oximetry and EKG  Central Line Procedure  Laterality:right  Location:internal jugular  Catheter Type:Cordis and [place]-Emily  Catheter Size:9 Fr  Guidance:ultrasound guided  PROCEDURE NOTE/ULTRASOUND INTERPRETATION.  Using ultrasound guidance the potential vascular sites for insertion of the catheter were visualized to determine the patency of the vessel to be used for vascular access.  After selecting the appropriate site for insertion, the needle was visualized under ultrasound being inserted into the internal jugular vein, followed by ultrasound confirmation of wire and catheter placement. There were no abnormalities seen on ultrasound; an image was taken; and the patient tolerated the procedure with no complications. Images: still images obtained, printed/placed on chart  Assessment  Post procedure:biopatch applied, line sutured, occlusive dressing applied and secured with tape  Assessement:blood return through all ports, free fluid flow, chest x-ray ordered and Tr Test  Complications:no  Patient Tolerance:patient tolerated the procedure well with no apparent complications

## 2021-03-11 NOTE — PROGRESS NOTES
Critical Care Note     LOS: 0 days   Patient Care Team:  Johan Tse MD as PCP - General (Internal Medicine)  Sree Johnson MD as Cardiologist (Cardiology)    Chief Complaint/Reason for visit:  No chief complaint on file.      Subjective     HPI:  Sony Marks is a 60 y.o. male, former smoker, with past medical history significant for NSTEMI (2011), ischemic cardiomyopathy, hypertension, hyperlipidemia, and diabetes mellitus type 2.  Patient was seen on 3/5/2021 in the cardiology clinic for follow-up of chest pain.  Originally he was seen on 2/21/2021 for chest pain consistent with angina however his condition continued to worsen and by the next visit he had been frequently using sublingual nitroglycerin to ameliorate symptoms.  He additionally complained of shortness of air at rest.  On 3/8/2021 he underwent TTE showing hypokinesis of the mid anterior wall, LVEF 40%.  The same day he underwent cardiac catheterization showing severe multivessel coronary artery disease with known ischemic cardiomyopathy.  He had a patent stent in the proximal LAD but severe stenosis in the mid LAD at the stent outflow, severe stenosis of the proximal OM1 with a patent stent in the mid OM1 and severe stenosis of the proximal RCA with an elevated LVEDP of 25.  He was referred to cardiothoracic surgery who recommended coronary artery bypass graft.  Preoperative pulmonary function test revealed an FVC of 3.48 L, 70%, FEV1 2.74 L, 73% ratio 78%, no obstruction.  He has a history of smoking cigars for 7 years but has not smoked in many years.    Interval History:     Today he underwent CABG x3.  Verbally I am told that he had some ventricular fibrillation coming off pump requiring amiodarone bolus and shock.  He comes to the ICU on epinephrine drip.  He remains in sinus rhythm.  Pulmonary artery pressures 35/21, cardiac output 5.5, cardiac index 2.4.  Chest tube output 45 mL, urine output 850 mL  Most recent ABG pH  7.40, PCO2 41, PO2 250 on 40%, rate 12, tidal volume 700, PEEP 5, pressure support 10.      Review of Systems:    All systems were reviewed and negative except as noted in subjective.    Medical history, surgical history, social history, family history reviewed    Objective     Intake/Output:    Intake/Output Summary (Last 24 hours) at 3/11/2021 1431  Last data filed at 3/11/2021 1300  Gross per 24 hour   Intake 1000 ml   Output 1595 ml   Net -595 ml       Nutrition:  NPO Diet    Infusions:  dexmedetomidine, 0.2-1.5 mcg/kg/hr  dextrose 5 % with KCl 20 mEq, 30 mL/hr  DOBUTamine, 2-20 mcg/kg/min  DOPamine, 2-20 mcg/kg/min  EPINEPHrine, 0.02-0.3 mcg/kg/min  insulin, 0-50 Units/hr  lactated ringers, 9 mL/hr, Last Rate: 9 mL/hr (03/11/21 0615)  niCARdipine, 5-15 mg/hr  nitroglycerin, 5-200 mcg/min  norepinephrine, 0.02-0.3 mcg/kg/min  phenylephrine, 0.5-3 mcg/kg/min  propofol, 5-50 mcg/kg/min  sodium chloride, 30 mL/hr        Mechanical Ventilator Settings:            Resp Rate (Set): 12  Pressure Support (cm H2O): 10 cm H20  FiO2 (%): 40 %  PEEP/CPAP (cm H2O): 5 cm H20    Minute Ventilation (L/min) (Obs): 8.67 L/min  Resp Rate (Observed) Vent: 12  I:E Ratio (Set): 1:3.00  I:E Ratio (Obs): 1:3.00    PIP Observed (cm H2O): 25 cm H2O  Plateau Pressure (cm H2O): 0 cm H2O    Telemetry: Sinus rhythm             Vital Signs  Blood pressure 141/87, pulse 86, temperature 99.6 °F (37.6 °C), temperature source Temporal, resp. rate 18, SpO2 97 %.    Physical Exam:  General Appearance:   Well-developed middle-aged gentleman, sedated   Head:   Normocephalic, atraumatic   Eyes:          Conjunctiva pink.  Pupils small and equal, no jaundice   Ears:     Throat:  Orally intubated   Neck:  Trachea midline, no crepitus.  Right IJ PA catheter   Back:      Lungs:    Sternotomy incision intact.  Mediastinal tubes with bloody drainage.  Symmetric chest expansion with bilateral breath sounds, no rhonchi or wheeze    Heart:   Regular rhythm,  S1, S2 auscultated, no appreciable murmur or rub   Abdomen:    Abdomen is quiet, nondistended, soft    Rectal:   Deferred   Extremities: Ace wrap right leg.  No pretibial edema.  Left radial arterial line.  Left hand warm and pink   Pulses:    Skin:  No rash or cyanosis   Lymph nodes:    Neurologic:  Sedated on propofol      Results Review:     I reviewed the patient's new clinical results.   Results from last 7 days   Lab Units 03/11/21  1311 03/09/21  1619 03/08/21  0844   SODIUM mmol/L 143 141 138   POTASSIUM mmol/L 4.2  4.2 4.4 4.2   CHLORIDE mmol/L 106 101 103   CO2 mmol/L 25.0 28.0 25.0   BUN mg/dL 21* 13 21*   CREATININE mg/dL 1.31* 0.88 1.09   CALCIUM mg/dL 10.6* 9.9 9.3   BILIRUBIN mg/dL  --  0.6  --    ALK PHOS U/L  --  78  --    ALT (SGPT) U/L  --  17  --    AST (SGOT) U/L  --  18  --    GLUCOSE mg/dL 113* 117* 129*     Results from last 7 days   Lab Units 03/11/21  1311 03/11/21  1215 03/11/21  1145 03/09/21  1619 03/08/21  0844 03/08/21  0844   WBC 10*3/mm3 27.29*  --   --  12.47*  --  11.35*   HEMOGLOBIN g/dL 12.8*  --   --  15.4  --  15.1   HEMOGLOBIN, POC g/dL  --  11.9* 11.6*  --    < >  --    HEMATOCRIT % 39.7  --   --  47.5  --  46.2   HEMATOCRIT POC %  --  35* 34*  --    < >  --    PLATELETS 10*3/mm3 225  --   --  376  --  345    < > = values in this interval not displayed.     Results from last 7 days   Lab Units 03/11/21  1335   PH, ARTERIAL pH units 7.401   PO2 ART mm Hg 251.0*   PCO2, ARTERIAL mm Hg 41.7   HCO3 ART mmol/L 25.9     No results found for: BLOODCX  No results found for: URINECX    I reviewed the patient's new imaging including images and reports.    COMPARISON: Chest x-ray 03/09/2021     FINDINGS: Postsurgical appearance of the chest status post median  sternotomy with endotracheal tube well-positioned above the level of the  wade. Esophagogastric tube terminates within the proximal stomach.  Mediastinal and pleural drains in place. Right internal jugular approach  pulmonary  arterial catheter terminates at the level near the main  pulmonary artery. Mild central vascular congestion without overt edema.  No pneumothorax or pleural effusion.     IMPRESSION:  Postsurgical appearance of the chest with mild central  vascular congestion as expected without overt edema or pneumothorax. No  pleural effusion. Support hardware in satisfactory position as detailed  above.     D:  03/11/2021    I reviewed his x-ray personally, PA catheter and ET tube in good position.  His vasculature appears normal to me.  There is no pneumothorax or significant effusion.    All medications reviewed.   [START ON 3/12/2021] aspirin, 325 mg, Oral, Daily  atorvastatin, 80 mg, Oral, Nightly  cefuroxime, 1.5 g, Intravenous, Q8H  chlorhexidine, 15 mL, Mouth/Throat, Q12H  EPINEPHrine, 0.02-0.3 mcg/kg/min, Intravenous, Once  insulin (HUMAN R) infusion, 0.1 Units/kg/hr, Intravenous, Once  [START ON 3/12/2021] metoprolol tartrate, 12.5 mg, Oral, Q12H  pharmacy consult - [place], , Does not apply, Daily  protamine, 50 mg, Intravenous, Once  senna-docusate sodium, 2 tablet, Oral, BID          Assessment/Plan       Coronary artery disease involving native coronary artery of native heart with unstable angina pectoris (CMS/HCC)    Hypertension    Hyperlipidemia LDL goal <70    Type 2 diabetes mellitus, without long-term current use of insulin (CMS/HCC)    Ischemic cardiomyopathy    60-year-old gentleman with a known history of coronary disease, previous stents presenting with angina and found to have severe multivessel disease.  He underwent CABG x3 today.  Coming off pump he required amiodarone and shock for some ventricular fibrillation.  He is currently maintaining a sinus rhythm.  Cardiac index is 2.4 on some low-dose epinephrine 0.04 mics per kilogram per minute.  He has scant mediastinal drainage and adequate urine output.  He did not receive blood products except for Cell Saver.  He has a remote history of smoking cigars and  normal FEV1 preoperatively.     He has a history of diabetes mellitus type 2, recent onset.  His preoperative A1c is 6.6.  Currently his blood glucose is 100 and he is not requiring an insulin drip.    He is currently requiring some low-dose epinephrine for support.  As an outpatient he was taking carvedilol 12.5 mg, lisinopril 20 mg for control of his hypertension and Lipitor 80 mg for dyslipidemia.        PLAN:    Wean epinephrine as blood pressure allows  Monitor hourly hemodynamic profile, urine output, mediastinal drainage    Monitor for any further arrhythmias    Anticipate that he should wean from mechanical ventilation once he is hemodynamically stable    Aspirin, statin, beta-blocker    Initiate insulin drip per protocol if he develops hyperglycemia    Monitor electrolytes and replace as needed    VTE Prophylaxis: Foot pumps    Stress Ulcer Prophylaxis: None    Audrey Guerra MD  03/11/21  14:31 EST      Time: Critical care 35 min  I personally provided care to this critically ill patient as documented above.  Critical care time does not include time spent on separately billed procedures.  None of my critical care time was concurrent with other critical care providers.

## 2021-03-11 NOTE — ANESTHESIA PROCEDURE NOTES
Preanesthesia Checklist:  Patient identified, IV assessed, risks and benefits discussed, monitors and equipment assessed, procedure being performed at surgeon's request and anesthesia consent obtained.    General Procedure Information  GERMAN Placed for monitoring purposes only -- This is not a diagnostic GERMAN  Diagnostic Indications for Echo:  assessment of ascending aorta and hemodynamic monitoring  Physician Requesting Echo: Panfilo Hamm MD  Location performed:  OR  Intubated  Heart visualized  Probe Type:  Multiplane  Modalities:  2D only, color flow mapping, continuous wave Doppler and pulse wave Doppler    Echocardiographic and Doppler Measurements    Ventricles    Right Ventricle:  Cavity size normal.  Hypertrophy not present.  Thrombus not present.  Global function normal.    Left Ventricle:  Cavity size normal.  Diastolic dimension 4.7 cm.  Thrombus not present.  Global Function moderately impaired.  Ejection Fraction 40%.      Ventricular Regional Function:  1- Basal Anteroseptal:  normal  2- Basal Anterior:  normal  3- Basal Anterolateral:  normal  4- Basal Inferolateral:  normal  5- Basal Inferior:  normal  6- Basal Inferoseptal:  normal  7- Mid Anteroseptal:  normal  8- Mid Anterior:  normal  9- Mid Anterolateral:  normal  10- Mid Inferolateral:  hypokinetic  11- Mid Inferior:  hypokinetic  12- Mid Inferoseptal:  normal  13- Apical Anterior:  akinetic  14- Apical Lateral:  akinetic  15- Apical Inferior:  akinetic  16- Apical Septal:  dyskinetic  17- [place]:  akinetic      Valves    Aortic Valve:  Annulus normal.  Stenosis not present.  Regurgitation trace.  Leaflets normal.  Leaflet motions normal.      Mitral Valve:  Annulus normal.  Stenosis not present.  Regurgitation trace.  Leaflets normal.  Leaflet motions normal.      Tricuspid Valve:  Annulus normal.  Stenosis not present.  Regurgitation trace.  Leaflets normal.  Leaflet motions normal.    Pulmonic Valve:  Annulus normal.  Stenosis not present.   Regurgitation trace.          Aorta    Ascending Aorta:  Size normal.  Dissection not present.  Plaque thickness less than 3 mm.  Mobile plaque not present.    Aortic Arch:  Size normal.  Dissection not present.  Plaque thickness less than 3 mm.  Mobile plaque not present.    Descending Aorta:  Size normal.  Dissection not present.  Plaque thickness less than 3 mm.  Mobile plaque not present.          Atria    Right Atrium:  Size normal.  Spontaneous echo contrast not present.  Thrombus not present.  Tumor not present.  Device not present.      Left Atrium:  Size normal.  Spontaneous echo contrast not present.  Thrombus not present.  Tumor not present.  Device not present.    Left atrial appendage normal.      Septa    Atrial Septum:  Intra-atrial septal morphology normal.      Ventricular Septum:  Intra-ventricular septum morphology normal.      Diastolic Function Measurements:  Diastolic Dysfunction Grade= III  E= 59 ms  A=  25 ms  E/A Ratio=  2.4  DT=  100 ms  S/D=  0.7  IVRT=    Other Findings  Pericardium:  normal  Pleural Effusion:  none  Pulmonary Arteries:  normal  Pulmonary Venous Flow:  normal    Anesthesia Information  Performed Personally

## 2021-03-12 ENCOUNTER — APPOINTMENT (OUTPATIENT)
Dept: GENERAL RADIOLOGY | Facility: HOSPITAL | Age: 61
End: 2021-03-12

## 2021-03-12 LAB
ALBUMIN SERPL-MCNC: 4.9 G/DL (ref 3.5–5.2)
ALBUMIN SERPL-MCNC: 4.9 G/DL (ref 3.5–5.2)
ANION GAP SERPL CALCULATED.3IONS-SCNC: 11 MMOL/L (ref 5–15)
ANION GAP SERPL CALCULATED.3IONS-SCNC: 12 MMOL/L (ref 5–15)
BASOPHILS # BLD AUTO: 0.08 10*3/MM3 (ref 0–0.2)
BASOPHILS NFR BLD AUTO: 0.5 % (ref 0–1.5)
BUN SERPL-MCNC: 22 MG/DL (ref 6–20)
BUN SERPL-MCNC: 23 MG/DL (ref 6–20)
BUN/CREAT SERPL: 14.2 (ref 7–25)
BUN/CREAT SERPL: 14.7 (ref 7–25)
CALCIUM SPEC-SCNC: 9 MG/DL (ref 8.6–10.5)
CALCIUM SPEC-SCNC: 9 MG/DL (ref 8.6–10.5)
CHLORIDE SERPL-SCNC: 104 MMOL/L (ref 98–107)
CHLORIDE SERPL-SCNC: 104 MMOL/L (ref 98–107)
CO2 SERPL-SCNC: 25 MMOL/L (ref 22–29)
CO2 SERPL-SCNC: 26 MMOL/L (ref 22–29)
CREAT SERPL-MCNC: 1.55 MG/DL (ref 0.76–1.27)
CREAT SERPL-MCNC: 1.56 MG/DL (ref 0.76–1.27)
DEPRECATED RDW RBC AUTO: 42.6 FL (ref 37–54)
DEPRECATED RDW RBC AUTO: 43.2 FL (ref 37–54)
EOSINOPHIL # BLD AUTO: 0 10*3/MM3 (ref 0–0.4)
EOSINOPHIL NFR BLD AUTO: 0 % (ref 0.3–6.2)
ERYTHROCYTE [DISTWIDTH] IN BLOOD BY AUTOMATED COUNT: 12.3 % (ref 12.3–15.4)
ERYTHROCYTE [DISTWIDTH] IN BLOOD BY AUTOMATED COUNT: 12.4 % (ref 12.3–15.4)
GFR SERPL CREATININE-BSD FRML MDRD: 46 ML/MIN/1.73
GFR SERPL CREATININE-BSD FRML MDRD: 46 ML/MIN/1.73
GLUCOSE BLDC GLUCOMTR-MCNC: 124 MG/DL (ref 70–130)
GLUCOSE BLDC GLUCOMTR-MCNC: 138 MG/DL (ref 70–130)
GLUCOSE BLDC GLUCOMTR-MCNC: 142 MG/DL (ref 70–130)
GLUCOSE BLDC GLUCOMTR-MCNC: 147 MG/DL (ref 70–130)
GLUCOSE BLDC GLUCOMTR-MCNC: 152 MG/DL (ref 70–130)
GLUCOSE BLDC GLUCOMTR-MCNC: 155 MG/DL (ref 70–130)
GLUCOSE BLDC GLUCOMTR-MCNC: 155 MG/DL (ref 70–130)
GLUCOSE BLDC GLUCOMTR-MCNC: 159 MG/DL (ref 70–130)
GLUCOSE BLDC GLUCOMTR-MCNC: 161 MG/DL (ref 70–130)
GLUCOSE BLDC GLUCOMTR-MCNC: 164 MG/DL (ref 70–130)
GLUCOSE BLDC GLUCOMTR-MCNC: 164 MG/DL (ref 70–130)
GLUCOSE BLDC GLUCOMTR-MCNC: 166 MG/DL (ref 70–130)
GLUCOSE SERPL-MCNC: 139 MG/DL (ref 65–99)
GLUCOSE SERPL-MCNC: 165 MG/DL (ref 65–99)
HCT VFR BLD AUTO: 36.4 % (ref 37.5–51)
HCT VFR BLD AUTO: 38 % (ref 37.5–51)
HGB BLD-MCNC: 11.5 G/DL (ref 13–17.7)
HGB BLD-MCNC: 12.1 G/DL (ref 13–17.7)
IMM GRANULOCYTES # BLD AUTO: 0.07 10*3/MM3 (ref 0–0.05)
IMM GRANULOCYTES NFR BLD AUTO: 0.5 % (ref 0–0.5)
INR PPP: 1.22 (ref 0.85–1.16)
LYMPHOCYTES # BLD AUTO: 2.03 10*3/MM3 (ref 0.7–3.1)
LYMPHOCYTES NFR BLD AUTO: 13.2 % (ref 19.6–45.3)
MAGNESIUM SERPL-MCNC: 2.7 MG/DL (ref 1.6–2.6)
MAGNESIUM SERPL-MCNC: 3 MG/DL (ref 1.6–2.6)
MCH RBC QN AUTO: 29.8 PG (ref 26.6–33)
MCH RBC QN AUTO: 29.9 PG (ref 26.6–33)
MCHC RBC AUTO-ENTMCNC: 31.6 G/DL (ref 31.5–35.7)
MCHC RBC AUTO-ENTMCNC: 31.8 G/DL (ref 31.5–35.7)
MCV RBC AUTO: 93.8 FL (ref 79–97)
MCV RBC AUTO: 94.3 FL (ref 79–97)
MONOCYTES # BLD AUTO: 1.31 10*3/MM3 (ref 0.1–0.9)
MONOCYTES NFR BLD AUTO: 8.5 % (ref 5–12)
NEUTROPHILS NFR BLD AUTO: 11.91 10*3/MM3 (ref 1.7–7)
NEUTROPHILS NFR BLD AUTO: 77.3 % (ref 42.7–76)
NRBC BLD AUTO-RTO: 0 /100 WBC (ref 0–0.2)
PHOSPHATE SERPL-MCNC: 4.3 MG/DL (ref 2.5–4.5)
PHOSPHATE SERPL-MCNC: 4.9 MG/DL (ref 2.5–4.5)
PLATELET # BLD AUTO: 221 10*3/MM3 (ref 140–450)
PLATELET # BLD AUTO: 240 10*3/MM3 (ref 140–450)
PMV BLD AUTO: 10.2 FL (ref 6–12)
PMV BLD AUTO: 10.2 FL (ref 6–12)
POTASSIUM SERPL-SCNC: 4.2 MMOL/L (ref 3.5–5.2)
POTASSIUM SERPL-SCNC: 4.2 MMOL/L (ref 3.5–5.2)
PROTHROMBIN TIME: 15.2 SECONDS (ref 11.5–14)
QT INTERVAL: 346 MS
QTC INTERVAL: 404 MS
RBC # BLD AUTO: 3.86 10*6/MM3 (ref 4.14–5.8)
RBC # BLD AUTO: 4.05 10*6/MM3 (ref 4.14–5.8)
SODIUM SERPL-SCNC: 141 MMOL/L (ref 136–145)
SODIUM SERPL-SCNC: 141 MMOL/L (ref 136–145)
WBC # BLD AUTO: 15.4 10*3/MM3 (ref 3.4–10.8)
WBC # BLD AUTO: 17.24 10*3/MM3 (ref 3.4–10.8)

## 2021-03-12 PROCEDURE — 85610 PROTHROMBIN TIME: CPT | Performed by: PHYSICIAN ASSISTANT

## 2021-03-12 PROCEDURE — P9041 ALBUMIN (HUMAN),5%, 50ML: HCPCS | Performed by: THORACIC SURGERY (CARDIOTHORACIC VASCULAR SURGERY)

## 2021-03-12 PROCEDURE — 25010000002 ALBUMIN HUMAN 5% PER 50 ML: Performed by: PHYSICIAN ASSISTANT

## 2021-03-12 PROCEDURE — 85027 COMPLETE CBC AUTOMATED: CPT | Performed by: THORACIC SURGERY (CARDIOTHORACIC VASCULAR SURGERY)

## 2021-03-12 PROCEDURE — 25010000003 CEFUROXIME SODIUM 1.5 G RECONSTITUTED SOLUTION: Performed by: PHYSICIAN ASSISTANT

## 2021-03-12 PROCEDURE — 71045 X-RAY EXAM CHEST 1 VIEW: CPT

## 2021-03-12 PROCEDURE — 25010000002 ALBUMIN HUMAN 5% PER 50 ML: Performed by: THORACIC SURGERY (CARDIOTHORACIC VASCULAR SURGERY)

## 2021-03-12 PROCEDURE — 97162 PT EVAL MOD COMPLEX 30 MIN: CPT

## 2021-03-12 PROCEDURE — 82962 GLUCOSE BLOOD TEST: CPT

## 2021-03-12 PROCEDURE — 63710000001 INSULIN LISPRO (HUMAN) PER 5 UNITS

## 2021-03-12 PROCEDURE — 83735 ASSAY OF MAGNESIUM: CPT | Performed by: PHYSICIAN ASSISTANT

## 2021-03-12 PROCEDURE — 25010000002 MORPHINE PER 10 MG: Performed by: THORACIC SURGERY (CARDIOTHORACIC VASCULAR SURGERY)

## 2021-03-12 PROCEDURE — 85025 COMPLETE CBC W/AUTO DIFF WBC: CPT | Performed by: PHYSICIAN ASSISTANT

## 2021-03-12 PROCEDURE — 80069 RENAL FUNCTION PANEL: CPT | Performed by: THORACIC SURGERY (CARDIOTHORACIC VASCULAR SURGERY)

## 2021-03-12 PROCEDURE — P9041 ALBUMIN (HUMAN),5%, 50ML: HCPCS | Performed by: PHYSICIAN ASSISTANT

## 2021-03-12 PROCEDURE — 83735 ASSAY OF MAGNESIUM: CPT | Performed by: THORACIC SURGERY (CARDIOTHORACIC VASCULAR SURGERY)

## 2021-03-12 PROCEDURE — 99233 SBSQ HOSP IP/OBS HIGH 50: CPT | Performed by: INTERNAL MEDICINE

## 2021-03-12 PROCEDURE — 99231 SBSQ HOSP IP/OBS SF/LOW 25: CPT | Performed by: NURSE PRACTITIONER

## 2021-03-12 PROCEDURE — 93005 ELECTROCARDIOGRAM TRACING: CPT | Performed by: PHYSICIAN ASSISTANT

## 2021-03-12 RX ORDER — ALBUMIN, HUMAN INJ 5% 5 %
500 SOLUTION INTRAVENOUS ONCE
Status: COMPLETED | OUTPATIENT
Start: 2021-03-12 | End: 2021-03-12

## 2021-03-12 RX ORDER — DEXTROSE MONOHYDRATE 25 G/50ML
25 INJECTION, SOLUTION INTRAVENOUS
Status: DISCONTINUED | OUTPATIENT
Start: 2021-03-12 | End: 2021-03-16 | Stop reason: HOSPADM

## 2021-03-12 RX ORDER — NICOTINE POLACRILEX 4 MG
15 LOZENGE BUCCAL
Status: DISCONTINUED | OUTPATIENT
Start: 2021-03-12 | End: 2021-03-16 | Stop reason: HOSPADM

## 2021-03-12 RX ORDER — ALPRAZOLAM 0.5 MG/1
0.5 TABLET ORAL 3 TIMES DAILY PRN
Status: DISCONTINUED | OUTPATIENT
Start: 2021-03-12 | End: 2021-03-16 | Stop reason: HOSPADM

## 2021-03-12 RX ORDER — OXYCODONE HYDROCHLORIDE 5 MG/1
10 TABLET ORAL EVERY 4 HOURS PRN
Status: DISCONTINUED | OUTPATIENT
Start: 2021-03-12 | End: 2021-03-16 | Stop reason: HOSPADM

## 2021-03-12 RX ORDER — FAMOTIDINE 20 MG/1
20 TABLET, FILM COATED ORAL
Status: DISCONTINUED | OUTPATIENT
Start: 2021-03-12 | End: 2021-03-16 | Stop reason: HOSPADM

## 2021-03-12 RX ADMIN — ALBUMIN HUMAN 500 ML: 0.05 INJECTION, SOLUTION INTRAVENOUS at 00:22

## 2021-03-12 RX ADMIN — ATORVASTATIN CALCIUM 80 MG: 40 TABLET, FILM COATED ORAL at 20:07

## 2021-03-12 RX ADMIN — CEFUROXIME SODIUM 1.5 G: 1.5 INJECTION, POWDER, FOR SOLUTION INTRAVENOUS at 20:08

## 2021-03-12 RX ADMIN — MORPHINE SULFATE 2 MG: 4 INJECTION, SOLUTION INTRAMUSCULAR; INTRAVENOUS at 23:00

## 2021-03-12 RX ADMIN — MORPHINE SULFATE 2 MG: 4 INJECTION, SOLUTION INTRAMUSCULAR; INTRAVENOUS at 04:50

## 2021-03-12 RX ADMIN — DEXMEDETOMIDINE HYDROCHLORIDE 1.1 MCG/KG/HR: 400 INJECTION, SOLUTION INTRAVENOUS at 02:33

## 2021-03-12 RX ADMIN — FAMOTIDINE 20 MG: 10 INJECTION, SOLUTION INTRAVENOUS at 08:27

## 2021-03-12 RX ADMIN — DEXMEDETOMIDINE HYDROCHLORIDE 0.4 MCG/KG/HR: 400 INJECTION, SOLUTION INTRAVENOUS at 23:07

## 2021-03-12 RX ADMIN — MORPHINE SULFATE 2 MG: 4 INJECTION, SOLUTION INTRAMUSCULAR; INTRAVENOUS at 08:21

## 2021-03-12 RX ADMIN — MORPHINE SULFATE 2 MG: 4 INJECTION, SOLUTION INTRAMUSCULAR; INTRAVENOUS at 11:41

## 2021-03-12 RX ADMIN — MORPHINE SULFATE 2 MG: 4 INJECTION, SOLUTION INTRAMUSCULAR; INTRAVENOUS at 21:40

## 2021-03-12 RX ADMIN — DOCUSATE SODIUM 50MG AND SENNOSIDES 8.6MG 2 TABLET: 8.6; 5 TABLET, FILM COATED ORAL at 08:27

## 2021-03-12 RX ADMIN — CEFUROXIME SODIUM 1.5 G: 1.5 INJECTION, POWDER, FOR SOLUTION INTRAVENOUS at 03:34

## 2021-03-12 RX ADMIN — FAMOTIDINE 20 MG: 20 TABLET, FILM COATED ORAL at 17:16

## 2021-03-12 RX ADMIN — OXYCODONE AND ACETAMINOPHEN 2 TABLET: 5; 325 TABLET ORAL at 07:40

## 2021-03-12 RX ADMIN — OXYCODONE AND ACETAMINOPHEN 2 TABLET: 5; 325 TABLET ORAL at 12:16

## 2021-03-12 RX ADMIN — ALPRAZOLAM 0.5 MG: 0.5 TABLET ORAL at 09:52

## 2021-03-12 RX ADMIN — OXYCODONE 10 MG: 5 TABLET ORAL at 16:03

## 2021-03-12 RX ADMIN — OXYCODONE AND ACETAMINOPHEN 2 TABLET: 5; 325 TABLET ORAL at 01:15

## 2021-03-12 RX ADMIN — DOCUSATE SODIUM 50MG AND SENNOSIDES 8.6MG 2 TABLET: 8.6; 5 TABLET, FILM COATED ORAL at 20:04

## 2021-03-12 RX ADMIN — ALPRAZOLAM 0.5 MG: 0.5 TABLET ORAL at 20:07

## 2021-03-12 RX ADMIN — OXYCODONE 10 MG: 5 TABLET ORAL at 20:07

## 2021-03-12 RX ADMIN — CEFUROXIME SODIUM 1.5 G: 1.5 INJECTION, POWDER, FOR SOLUTION INTRAVENOUS at 12:10

## 2021-03-12 RX ADMIN — MORPHINE SULFATE 2 MG: 4 INJECTION, SOLUTION INTRAMUSCULAR; INTRAVENOUS at 08:57

## 2021-03-12 RX ADMIN — METOPROLOL TARTRATE 12.5 MG: 25 TABLET, FILM COATED ORAL at 20:05

## 2021-03-12 RX ADMIN — INSULIN HUMAN 2.4 UNITS/HR: 1 INJECTION, SOLUTION INTRAVENOUS at 08:21

## 2021-03-12 RX ADMIN — DEXMEDETOMIDINE HYDROCHLORIDE 0.2 MCG/KG/HR: 400 INJECTION, SOLUTION INTRAVENOUS at 09:52

## 2021-03-12 RX ADMIN — ASPIRIN 325 MG: 325 TABLET, COATED ORAL at 08:27

## 2021-03-12 RX ADMIN — ALBUMIN HUMAN 500 ML: 0.05 INJECTION, SOLUTION INTRAVENOUS at 08:27

## 2021-03-12 RX ADMIN — HYDROCODONE BITARTRATE AND ACETAMINOPHEN 1 TABLET: 7.5; 325 TABLET ORAL at 04:08

## 2021-03-12 RX ADMIN — HYDROCODONE BITARTRATE AND ACETAMINOPHEN 1 TABLET: 7.5; 325 TABLET ORAL at 09:55

## 2021-03-12 RX ADMIN — INSULIN LISPRO 3 UNITS: 100 INJECTION, SOLUTION INTRAVENOUS; SUBCUTANEOUS at 12:10

## 2021-03-12 NOTE — PROGRESS NOTES
Multidisciplinary Rounds    Time: 20min  Patient Name: Sony Marks  Date of Encounter: 03/12/21 11:17 EST  MRN: [id number]  Admission date: 3/11/2021      Reason for visit: MDR. RD to continue to follow per protocol.     Additional information obtained during MDR: Pt s/p CABG x3 yesterday (3/11). PO diet started this AM. Pt on epinephrine and precedex.     Current diet: Diet Regular; Consistent Carbohydrate    Intervention:  Follow treatment plan  Care plan reviewed    Follow up:   Per protocol      Rachelle Masters MS RD/LD [facility]  11:17 EST

## 2021-03-12 NOTE — PLAN OF CARE
Problem: Adult Inpatient Plan of Care  Goal: Plan of Care Review  Recent Flowsheet Documentation  Taken 3/12/2021 1436 by Sarahi Gill PT  Progress: improving  Plan of Care Reviewed With: patient  Outcome Summary: PT eval is completed, patient presents S/P CABG x3. He demonstrates impaired bed mobility transfers and gait, he was able to stand with min assist and ambulate 80 ft with min assist and cues for pursed lip breathing. anticipate patient to be able to go home with family assist at D/C   Goal Outcome Evaluation:  Plan of Care Reviewed With: patient  Progress: improving  Outcome Summary: PT eval is completed, patient presents S/P CABG x3. He demonstrates impaired bed mobility transfers and gait, he was able to stand with min assist and ambulate 80 ft with min assist and cues for pursed lip breathing. anticipate patient to be able to go home with family assist at D/C

## 2021-03-12 NOTE — PROGRESS NOTES
Cardiothoracic Surgery Progress Note      POD # 1 s/p CABG x 3     LOS: 1 day      Subjective:  No acute events.  Pain controlled.  Denies shortness of breath    Objective:  Vital Signs  Temp:  [98 °F (36.7 °C)-102.8 °F (39.3 °C)] 99.2 °F (37.3 °C)  Heart Rate:  [] 76  Resp:  [12-36] 21  BP: ()/() 97/63  Arterial Line BP: ()/(44-75) 101/52  FiO2 (%):  [40 %] 40 %    Physical Exam:   General Appearance: alert, appears stated age and cooperative   Lungs: clear to auscultation, respirations regular, respirations even and respirations unlabored   Heart: regular rhythm & normal rate, normal S1, S2 and no murmur, no gallop, no rub   Skin: Incision c/d/i     Results:  Results from last 7 days   Lab Units 03/12/21  0319   WBC 10*3/mm3 15.40*   HEMOGLOBIN g/dL 11.5*   HEMATOCRIT % 36.4*   PLATELETS 10*3/mm3 221     Results from last 7 days   Lab Units 03/12/21  0319   SODIUM mmol/L 141   POTASSIUM mmol/L 4.2   CHLORIDE mmol/L 104   CO2 mmol/L 25.0   BUN mg/dL 22*   CREATININE mg/dL 1.55*   GLUCOSE mg/dL 139*   CALCIUM mg/dL 9.0       Assessment:  POD # 1 s/p CABG x 3    Plan:  Wean levophed as tolerated  Keep SBP around 120 for renal protection  Albumin  Keep chest tubes and wires  D/C bessy Hamm MD  03/12/21  07:34 EST

## 2021-03-12 NOTE — PROGRESS NOTES
Discharge Planning Assessment  [facility]     Patient Name: Sony Marks  MRN: [id number]  Today's Date: 3/12/2021    Admit Date: 3/11/2021    Discharge Needs Assessment     Row Name 03/12/21 1443       Living Environment    Lives With  significant other    Current Living Arrangements  home/apartment/condo    Primary Care Provided by  self    Provides Primary Care For  no one    Family Caregiver if Needed  significant other;sibling(s)    Quality of Family Relationships  helpful;involved;supportive    Able to Return to Prior Arrangements  yes       Resource/Environmental Concerns    Resource/Environmental Concerns  none       Transition Planning    Patient/Family Anticipates Transition to  home with family    Patient/Family Anticipated Services at Transition  none       Discharge Needs Assessment    Readmission Within the Last 30 Days  no previous admission in last 30 days    Equipment Currently Used at Home  none    Concerns to be Addressed  discharge planning        Discharge Plan     Row Name 03/12/21 1444       Plan    Plan  Home    Patient/Family in Agreement with Plan  yes    Plan Comments  Met with patient in the room to initiate discharge planning. Patient lives with his significant other in a home in [facility]. He is independent with ADLs and mobility and does not use any DME. Patient's plan is to discharge to his sister's home in [place] while he recuperates. His s.o. will provide his ride. Patient does not anticipate any DC needs at this time. PT is following. CM will continue to follow.    Final Discharge Disposition Code  01 - home or self-care        Continued Care and Services - Admitted Since 3/11/2021    Coordination has not been started for this encounter.       Expected Discharge Date and Time     Expected Discharge Date Expected Discharge Time    Mar 17, 2021         Demographic Summary     Row Name 03/12/21 1442       General Information    Admission Type  inpatient    Referral  Source  admission list    Reason for Consult  discharge planning    General Information Comments  Confirmed with patient that his PCP is Dr. Johan Tse. He confirms he has medical and rx coverage through [employer].        Functional Status     Row Name 03/12/21 1443       Functional Status    Usual Activity Tolerance  good       Functional Status, IADL    Medications  independent    Meal Preparation  independent    Housekeeping  independent    Laundry  independent    Shopping  independent        Psychosocial    No documentation.       Abuse/Neglect    No documentation.       Legal    No documentation.       Substance Abuse    No documentation.       Patient Forms    No documentation.           Lois Cannon RN

## 2021-03-12 NOTE — PAYOR COMM NOTE
"Mariela Briones RN  Utilization Review  P: [phone]  F: [phone]    Ref # [id number]  Updated clinicals for continued stay    Sony Marks (60 y.o. Male)     Date of Birth Social Security Number Address Home Phone MRN    1960  [address] [place] [address] [phone] [id number]    Zoroastrianism Marital Status          None Single       Admission Date Admission Type Admitting Provider Attending Provider Department, Room/Bed    3/11/21 Elective Panfilo Hamm MD Chaney, John H, MD [facility] 2HSIC, S253/1    Discharge Date Discharge Disposition Discharge Destination                       Attending Provider: Panfilo Hamm MD    Allergies: No Known Allergies    Isolation: None   Infection: None   Code Status: CPR    Ht: 180.3 cm (71\")   Wt: 109 kg (241 lb 2.9 oz)    Admission Cmt: None   Principal Problem: Coronary artery disease involving native coronary artery of native heart with unstable angina pectoris (CMS/[facility]) [I25.110] More...                 Active Insurance as of 3/11/2021     Primary Coverage     Payor Plan Insurance Group Employer/Plan Group    [employer] [place] [employer] [place]ICA[place]      Payor Plan Address Payor Plan Phone Number Payor Plan Fax Number Effective Dates    [address] [address] [phone]  2/23/2021 - None Entered    [facility] [id number]       Subscriber Name Subscriber Birth Date Member ID       SONY MARKS 1960 [id number]                 Emergency Contacts      (Rel.) Home Phone Work Phone Mobile Phone    armando francois (Significant Other) [phone] -- --    FRANSISCO MARKS (Sister) [phone] -- --               History & Physical      Shanell Vazquez APRN at 03/11/21 0600     Attestation signed by Panfilo Hamm MD at 03/11/21 0651    Agree with above.  No interval change.  Plan for CABG.                    [facility] Pre-op    Full history and physical note from office is attached.    /87 (BP " Location: Left arm, Patient Position: Lying)   Pulse 86   Temp 99.6 °F (37.6 °C) (Temporal)   Resp 18   SpO2 97%     Immunizations:  Influenza:  No  Pneumococcal:  No  Tetanus:  No    LAB Results:  Lab Results   Component Value Date    WBC 12.47 (H) 03/09/2021    HGB 15.4 03/09/2021    HCT 47.5 03/09/2021    MCV 91.9 03/09/2021     03/09/2021    NEUTROABS 8.19 (H) 03/09/2021    GLUCOSE 117 (H) 03/09/2021    BUN 13 03/09/2021    CREATININE 0.88 03/09/2021    EGFRIFNONA 89 03/09/2021     03/09/2021    K 4.4 03/09/2021     03/09/2021    CO2 28.0 03/09/2021    MG 2.0 03/09/2021    CALCIUM 9.9 03/09/2021    ALBUMIN 4.80 03/09/2021    AST 18 03/09/2021    ALT 17 03/09/2021    BILITOT 0.6 03/09/2021    PTT 32.4 03/09/2021    INR 0.97 03/09/2021       Cancer Staging (if applicable)  Cancer Patient: __ yes __no __unknown__N/A; If yes, clinical stage T:__ N:__M:__, stage group or __N/A      Impression: Unstable angina      Plan: CORONARY ARTERY BYPASS GRAFTING      Shanell George, APRN   3/11/2021   06:37 EST     Electronically signed by Panfilo Hamm MD at 03/11/21 0651   Source Note          03/09/2021  Patient Information  Sony Marks                                                                                          [address] [place] [id number]   1960  'PCP/Referring Physician'  Johan Tse MD  [phone]  No ref. provider found    Chief Complaint   Patient presents with   • Consult     Np referred for coronary artery disease,complains of chest pain and fatigue.   • Coronary Artery Disease       History of Present Illness: 60-year-old  male with a history of hypertension, hyperlipidemia, coronary artery disease status post stenting, remote tobacco abuse and newly diagnosed diabetes who presents with chest pain.  For the past 10 to 12 days, the patient notes worsening substernal chest pain.  He describes this is a significant ache that starts in the  back and radiates to the middle of his chest.  He has associated fatigue and shortness of breath with significant exertion.  The patient lost his previous primary care physician and had not taken his medications for several years.    Patient Active Problem List   Diagnosis   • Unstable angina (CMS/HCC)     Past Medical History:   Diagnosis Date   • Coronary artery disease    • Heart attack (CMS/HCC) 01/2011   • Hyperlipidemia    • Hypertension      Past Surgical History:   Procedure Laterality Date   • CARDIAC CATHETERIZATION     • CARDIAC CATHETERIZATION N/A 3/8/2021    Procedure: Coronary angiography;  Surgeon: Sree Johnson MD;  Location:  [facility] CATH INVASIVE LOCATION;  Service: Cardiology;  Laterality: N/A;   • CARDIAC CATHETERIZATION N/A 3/8/2021    Procedure: Left Heart Cath;  Surgeon: Sree Johnson MD;  Location: [facility] CATH INVASIVE LOCATION;  Service: Cardiology;  Laterality: N/A;   • CORONARY ANGIOPLASTY WITH STENT PLACEMENT  01/2011       Current Outpatient Medications:   •  aspirin 81 MG EC tablet, Take 1 tablet by mouth Daily., Disp: 180 tablet, Rfl: 3  •  atorvastatin (LIPITOR) 80 MG tablet, Take 1 tablet by mouth Daily., Disp: 90 tablet, Rfl: 3  •  carvedilol (COREG) 12.5 MG tablet, Take 0.5 tablets by mouth 2 (Two) Times a Day With Meals., Disp: 60 tablet, Rfl: 3  •  isosorbide mononitrate (IMDUR) 60 MG 24 hr tablet, Take 0.5 tablets by mouth Daily., Disp: 30 tablet, Rfl: 0  •  lisinopril (PRINIVIL,ZESTRIL) 20 MG tablet, Take 1 tablet by mouth Daily., Disp: 90 tablet, Rfl: 3  •  [START ON 3/11/2021] metFORMIN (Glucophage) 500 MG tablet, Take 1 tablet by mouth 2 (Two) Times a Day With Meals., Disp: 60 tablet, Rfl: 3  •  nitroglycerin (NITROSTAT) 0.4 MG SL tablet, 1 under the tongue as needed for angina, may repeat q5mins for up three doses, Disp: 30 tablet, Rfl: 3  No current facility-administered medications for this visit.  No Known Allergies  Social History     Socioeconomic History   •  Marital status: Single     Spouse name: Not on file   • Number of children: 2   • Years of education: Not on file   • Highest education level: Not on file   Tobacco Use   • Smoking status: Former Smoker     Years: 7.00     Types: Cigars   • Smokeless tobacco: Never Used   • Tobacco comment: smoked 3 cigars a week   Substance and Sexual Activity   • Alcohol use: Yes     Comment: very little   • Drug use: Not Currently   • Sexual activity: Defer     Family History   Problem Relation Age of Onset   • Alzheimer's disease Mother    • Bone cancer Mother    • Coronary artery disease Father    • Heart attack Father    • Diabetes Father    • Hypertension Father    • Lung disease Father      Review of Systems   Constitutional: Positive for malaise/fatigue. Negative for chills, fever, night sweats and weight loss.   HENT: Negative.  Negative for hearing loss, odynophagia and sore throat.    Cardiovascular: Positive for chest pain and dyspnea on exertion. Negative for leg swelling, orthopnea and palpitations.   Respiratory: Negative.  Negative for cough and hemoptysis.    Endocrine: Negative for cold intolerance, heat intolerance, polydipsia, polyphagia and polyuria.   Hematologic/Lymphatic: Negative.  Does not bruise/bleed easily.   Skin: Negative.  Negative for itching and rash.   Musculoskeletal: Negative.  Negative for joint pain, joint swelling and myalgias.   Gastrointestinal: Negative.  Negative for abdominal pain, constipation, diarrhea, hematemesis, hematochezia, melena, nausea and vomiting.   Genitourinary: Negative.  Negative for dysuria, frequency and hematuria.   Neurological: Negative.  Negative for focal weakness, headaches, numbness and seizures.   Psychiatric/Behavioral: Negative.  Negative for suicidal ideas.   All other systems reviewed and are negative.    Vitals:    03/09/21 1305   BP: 136/77   BP Location: Left arm   Patient Position: Sitting   Pulse: 74   Temp: 97.8 °F (36.6 °C)   SpO2: 98%   Weight: 110  "kg (242 lb)   Height: 180.3 cm (71\")      Physical Exam  Constitutional:       General: He is not in acute distress.     Appearance: He is well-developed. He is not diaphoretic.      Comments:  male who appears stated age   HENT:      Head: Normocephalic and atraumatic.   Eyes:      General: No scleral icterus.     Conjunctiva/sclera: Conjunctivae normal.   Neck:      Vascular: No JVD.      Trachea: No tracheal deviation.   Cardiovascular:      Rate and Rhythm: Normal rate and regular rhythm.      Heart sounds: Normal heart sounds. No murmur. No friction rub. No gallop.    Pulmonary:      Effort: Pulmonary effort is normal. No respiratory distress.      Breath sounds: Normal breath sounds. No wheezing or rales.   Abdominal:      General: There is no distension.      Palpations: Abdomen is soft. There is no mass.      Tenderness: There is no abdominal tenderness. There is no guarding or rebound.   Musculoskeletal:         General: Normal range of motion.      Cervical back: Neck supple.   Lymphadenopathy:      Cervical: No cervical adenopathy.      Upper Body:      Right upper body: No supraclavicular or axillary adenopathy.      Left upper body: No supraclavicular or axillary adenopathy.   Skin:     General: Skin is warm and dry.      Findings: No erythema or rash.   Neurological:      Mental Status: He is alert and oriented to person, place, and time.   Psychiatric:         Behavior: Behavior normal.         Thought Content: Thought content normal.         Judgment: Judgment normal.         The ROS, past medical history, surgical history, family history, social history and vitals were reviewed by myself and corrected as needed.      Labs/Imaging:  -Transthoracic echocardiogram performed 3/8/2021, personally reviewed and discussed with performing cardiologist Dr. Johnson, demonstrates hypokinesis of the mid anterior wall and apex.  There is grade 1 diastolic dysfunction.  EF 40%.  Trace mitral and tricuspid " regurgitation is present.  -Cardiac catheterization performed 3/8/2021, personally reviewed, demonstrates 90% mid LAD stenosis, 80% first obtuse marginal stenosis, 95% proximal right coronary artery stenosis and 40% posterior lateral branch stenosis.  LVEDP 25 mmHg    Assessment/Plan:  60-year-old  male with a history of hypertension, hyperlipidemia, coronary artery disease status post stenting, remote tobacco abuse and newly diagnosed diabetes who presents with chest pain. The patient has unstable angina in the setting of multivessel coronary artery disease.  The patient is a reasonable candidate for coronary artery bypass grafting.  The risks and benefits of surgery were discussed with the patient including pain, bleeding, infection, renal failure, stroke and death.  He understood these risks and wished to proceed with surgery.  A carotid duplex will be obtained to rule out significant stenosis.          Patient Active Problem List   Diagnosis   • Unstable angina (CMS/[facility])                        Electronically signed by Panfilo Hamm MD at 03/09/21 1442             Panfilo Hamm MD at 03/09/21 1300          03/09/2021  Patient Information  Sony Marks                                                                                          [address] [place], [place] [id number]   1960  'PCP/Referring Physician'  Johan Tse MD  [phone]  No ref. provider found    Chief Complaint   Patient presents with   • Consult     Np referred for coronary artery disease,complains of chest pain and fatigue.   • Coronary Artery Disease       History of Present Illness: 60-year-old  male with a history of hypertension, hyperlipidemia, coronary artery disease status post stenting, remote tobacco abuse and newly diagnosed diabetes who presents with chest pain.  For the past 10 to 12 days, the patient notes worsening substernal chest pain.  He describes this is a significant ache that  starts in the back and radiates to the middle of his chest.  He has associated fatigue and shortness of breath with significant exertion.  The patient lost his previous primary care physician and had not taken his medications for several years.    Patient Active Problem List   Diagnosis   • Unstable angina (CMS/HCC)     Past Medical History:   Diagnosis Date   • Coronary artery disease    • Heart attack (CMS/HCC) 01/2011   • Hyperlipidemia    • Hypertension      Past Surgical History:   Procedure Laterality Date   • CARDIAC CATHETERIZATION     • CARDIAC CATHETERIZATION N/A 3/8/2021    Procedure: Coronary angiography;  Surgeon: Sree Johnson MD;  Location: [facility] CATH INVASIVE LOCATION;  Service: Cardiology;  Laterality: N/A;   • CARDIAC CATHETERIZATION N/A 3/8/2021    Procedure: Left Heart Cath;  Surgeon: Sree Johnson MD;  Location: [facility] CATH INVASIVE LOCATION;  Service: Cardiology;  Laterality: N/A;   • CORONARY ANGIOPLASTY WITH STENT PLACEMENT  01/2011       Current Outpatient Medications:   •  aspirin 81 MG EC tablet, Take 1 tablet by mouth Daily., Disp: 180 tablet, Rfl: 3  •  atorvastatin (LIPITOR) 80 MG tablet, Take 1 tablet by mouth Daily., Disp: 90 tablet, Rfl: 3  •  carvedilol (COREG) 12.5 MG tablet, Take 0.5 tablets by mouth 2 (Two) Times a Day With Meals., Disp: 60 tablet, Rfl: 3  •  isosorbide mononitrate (IMDUR) 60 MG 24 hr tablet, Take 0.5 tablets by mouth Daily., Disp: 30 tablet, Rfl: 0  •  lisinopril (PRINIVIL,ZESTRIL) 20 MG tablet, Take 1 tablet by mouth Daily., Disp: 90 tablet, Rfl: 3  •  [START ON 3/11/2021] metFORMIN (Glucophage) 500 MG tablet, Take 1 tablet by mouth 2 (Two) Times a Day With Meals., Disp: 60 tablet, Rfl: 3  •  nitroglycerin (NITROSTAT) 0.4 MG SL tablet, 1 under the tongue as needed for angina, may repeat q5mins for up three doses, Disp: 30 tablet, Rfl: 3  No current facility-administered medications for this visit.  No Known Allergies  Social History     Socioeconomic  History   • Marital status: Single     Spouse name: Not on file   • Number of children: 2   • Years of education: Not on file   • Highest education level: Not on file   Tobacco Use   • Smoking status: Former Smoker     Years: 7.00     Types: Cigars   • Smokeless tobacco: Never Used   • Tobacco comment: smoked 3 cigars a week   Substance and Sexual Activity   • Alcohol use: Yes     Comment: very little   • Drug use: Not Currently   • Sexual activity: Defer     Family History   Problem Relation Age of Onset   • Alzheimer's disease Mother    • Bone cancer Mother    • Coronary artery disease Father    • Heart attack Father    • Diabetes Father    • Hypertension Father    • Lung disease Father      Review of Systems   Constitutional: Positive for malaise/fatigue. Negative for chills, fever, night sweats and weight loss.   HENT: Negative.  Negative for hearing loss, odynophagia and sore throat.    Cardiovascular: Positive for chest pain and dyspnea on exertion. Negative for leg swelling, orthopnea and palpitations.   Respiratory: Negative.  Negative for cough and hemoptysis.    Endocrine: Negative for cold intolerance, heat intolerance, polydipsia, polyphagia and polyuria.   Hematologic/Lymphatic: Negative.  Does not bruise/bleed easily.   Skin: Negative.  Negative for itching and rash.   Musculoskeletal: Negative.  Negative for joint pain, joint swelling and myalgias.   Gastrointestinal: Negative.  Negative for abdominal pain, constipation, diarrhea, hematemesis, hematochezia, melena, nausea and vomiting.   Genitourinary: Negative.  Negative for dysuria, frequency and hematuria.   Neurological: Negative.  Negative for focal weakness, headaches, numbness and seizures.   Psychiatric/Behavioral: Negative.  Negative for suicidal ideas.   All other systems reviewed and are negative.    Vitals:    03/09/21 1305   BP: 136/77   BP Location: Left arm   Patient Position: Sitting   Pulse: 74   Temp: 97.8 °F (36.6 °C)   SpO2: 98%  "  Weight: 110 kg (242 lb)   Height: 180.3 cm (71\")      Physical Exam  Constitutional:       General: He is not in acute distress.     Appearance: He is well-developed. He is not diaphoretic.      Comments:  male who appears stated age   HENT:      Head: Normocephalic and atraumatic.   Eyes:      General: No scleral icterus.     Conjunctiva/sclera: Conjunctivae normal.   Neck:      Vascular: No JVD.      Trachea: No tracheal deviation.   Cardiovascular:      Rate and Rhythm: Normal rate and regular rhythm.      Heart sounds: Normal heart sounds. No murmur. No friction rub. No gallop.    Pulmonary:      Effort: Pulmonary effort is normal. No respiratory distress.      Breath sounds: Normal breath sounds. No wheezing or rales.   Abdominal:      General: There is no distension.      Palpations: Abdomen is soft. There is no mass.      Tenderness: There is no abdominal tenderness. There is no guarding or rebound.   Musculoskeletal:         General: Normal range of motion.      Cervical back: Neck supple.   Lymphadenopathy:      Cervical: No cervical adenopathy.      Upper Body:      Right upper body: No supraclavicular or axillary adenopathy.      Left upper body: No supraclavicular or axillary adenopathy.   Skin:     General: Skin is warm and dry.      Findings: No erythema or rash.   Neurological:      Mental Status: He is alert and oriented to person, place, and time.   Psychiatric:         Behavior: Behavior normal.         Thought Content: Thought content normal.         Judgment: Judgment normal.         The ROS, past medical history, surgical history, family history, social history and vitals were reviewed by myself and corrected as needed.      Labs/Imaging:  -Transthoracic echocardiogram performed 3/8/2021, personally reviewed and discussed with performing cardiologist Dr. Johnson, demonstrates hypokinesis of the mid anterior wall and apex.  There is grade 1 diastolic dysfunction.  EF 40%.  Trace mitral " and tricuspid regurgitation is present.  -Cardiac catheterization performed 3/8/2021, personally reviewed, demonstrates 90% mid LAD stenosis, 80% first obtuse marginal stenosis, 95% proximal right coronary artery stenosis and 40% posterior lateral branch stenosis.  LVEDP 25 mmHg    Assessment/Plan:  60-year-old  male with a history of hypertension, hyperlipidemia, coronary artery disease status post stenting, remote tobacco abuse and newly diagnosed diabetes who presents with chest pain. The patient has unstable angina in the setting of multivessel coronary artery disease.  The patient is a reasonable candidate for coronary artery bypass grafting.  The risks and benefits of surgery were discussed with the patient including pain, bleeding, infection, renal failure, stroke and death.  He understood these risks and wished to proceed with surgery.  A carotid duplex will be obtained to rule out significant stenosis.          Patient Active Problem List   Diagnosis   • Unstable angina (CMS/HCC)                        Electronically signed by Panfilo Hamm MD at 03/09/21 1442          Operative/Procedure Notes (all)      Panfilo Hamm MD at 03/11/21 0756  Version 1 of 1         CORONARY ARTERY BYPASS GRAFTING  Progress Note    Sony Miranda Marks  3/11/2021    Pre-op Diagnosis:   Unstable angina (CMS/HCC) [I20.0]       Post-Op Diagnosis Codes:     * Unstable angina (CMS/HCC) [I20.0]    Procedure/CPT® Codes:    Procedure(s):  MEDIAN STERNOTOMY, CORONARY ARTERY BYPASS GRAFTING x3, UTILIZATION OF LEFT INTERAL MAMMARY, EVH OF RIGHT GREATER SAPHENOUS VEIN. GERMAN PER ANESTHESIA    Surgeon(s):  Panfilo Hamm MD    Anesthesia: General    Staff:   Circulator: Nataliya Tan RN; Kya Petit RN; Joya Chong RN  Perfusionist: Joshua Hill  Scrub Person: Makayla Henry  Nursing Assistant: Libby Julian; Rosaura Bland  Assistant: Emely Joy PA-C; Jarrett Gutierrez  PA  Assistant: Emely Joy PA-C; Jarrett Gutierrez PA      Estimated Blood Loss: 500 mL    Urine Voided: 200 mL    Specimens:                None          Drains:   Urethral Catheter Temperature probe;Straight-tip;Silicone 16 Fr. (Active)       Y Chest Tube 1 and 2 1 Mediastinal 32 Fr. 2 Mediastinal 32 Fr. (Active)       Y Chest Tube 3 and 4 3 Mediastinal 32 Fr. 4 Mediastinal 32 Fr. (Active)       Findings: LIMA--LAD, GSV-->RCA, OM1    Complications: None    Assistant: Emely Joy PA-C; Jarrett Gutierrez PA  was responsible for performing the following activities: Retraction, Suction, Closing, Placing Dressing and Harvesting of Vessels and their skilled assistance was necessary for the success of this case.    Panfilo Hamm MD     Date: 3/11/2021  Time: 12:01 EST        Electronically signed by Panfilo Hamm MD at 03/11/21 1202     Panfilo Hamm MD at 03/11/21 0756  Version 1 of 1       DATE OF PROCEDURE: 3/11/2021     PREOPERATIVE DIAGNOSES:  1. Multivessel coronary artery disease status post stenting  2. Unstable angina  3. Hypertension  4. Hyperlipidemia  5. Diabetes mellitus  6. Remote tobacco abuse     POSTOPERATIVE DIAGNOSES:    1. Multivessel coronary artery disease status post stenting  2. Unstable angina  3. Hypertension  4. Hyperlipidemia  5. Diabetes mellitus  6. Remote tobacco abuse     PROCEDURES PERFORMED:    1. Coronary artery bypass graft x 3  2. Endoscopic vein harvesting (Right greater saphenous vein).       SURGEON: Panfilo Hamm MD       ASSISTANTS:    1. Jarrett Gutierrez PA was responsible for performing the following activities: Retraction, Suction, Closing, Placing Dressing and Harvesting of Vessels and their skilled assistance was necessary for the success of this case.  2. Emely Joy PA-C was responsible for performing the following activities: Closing and their skilled assistance was necessary for the success of this case.    Circulator:  Nataliya Tan, PETER; Kya Petit RN; Joya Chong RN  Perfusionist: Joshua Hill  Scrub Person: Nicole Harrington; Makayla Henry  Nursing Assistant: Libby Julian; Rosaura Bland    ANESTHESIA: General endotracheal anesthesia with Dr. Edil William MD     ESTIMATED BLOOD LOSS: 500 mL.       CROSSCLAMP TIME: 83 minutes.       TOTAL CARDIOPULMONARY BYPASS TIME: 109 minutes.       DISTAL BYPASS TARGETS:    1. LIMA to LAD.    2. Greater saphenous vein to RCA  3. Greater saphenous vein to OM1    INDICATIONS:  60-year-old  male with a history of hypertension, hyperlipidemia, coronary artery disease status post stenting, remote tobacco abuse and newly diagnosed diabetes who presented with unstable angina in the setting of multivessel coronary artery disease.  The patient was felt to be a reasonable candidate for surgical revascularization. The risks and benefits of surgery were discussed with the patient including pain, bleeding, infection, renal failure, stroke and death. The patient understood these risks and wished to proceed with surgery.      DESCRIPTION OF PROCEDURE: The patient was taken to the operating room and placed under general endotracheal anesthesia. A central line, [place]-Emily catheter, radial arterial line, and Felix catheter were placed. The patient was prepped and draped in the usual sterile fashion and a timeout was performed, including the patient's name, procedure, consent, beta blockade administration, and antibiotics were verified.    The right greater saphenous vein was harvested from the groin to below the knee using EVH technique. Subcutaneous tissues were closed with a running 3-0 Vicryl suture and 4-0 Monocryl subcuticular stitch. Simultaneously, a median sternotomy incision was made and electrocautery was utilized to gain access to the sternum. A midline sternotomy was performed after lung desufflation and hemostasis was achieved with  electrocautery.    Attention was turned to the left internal mammary artery, which was taken down using electrocautery and small clips. Dissection was performed proximally to the subclavian vein and distally to the bifurcation. The bifurcation was ligated with 2 large clips to each branch and sharply divided. This revealed excellent flow within the mammary artery which was ligated distally using small clips and irrigated with papaverine solution and placed in a soaked Ray-Amber sponge in the left pleural space. The pericardium was opened and stay sutures were placed to create a pericardial well. Next, 3-0 Prolene sutures were placed in the ascending aorta and systemic heparin was administered. Additional cannulation sutures were placed in the right atrial appendage, ascending aorta, and right atrium. After verification of satisfactory activated clotting time, the arterial cannula was placed and connected to the cardiopulmonary bypass circuit after being de-aired. The line was tested and a wrap was performed. The venous cannula was inserted followed by antegrade and retrograde cardioplegia lines. The vein was inspected and found to be of appropriate caliber and quality for bypass grafting. A slit within the pericardial well along the cephalad portion was created for appropriate transition of the mammary pedicle into the mediastinum. Cardiopulmonary bypass was initiated and the patient was allowed to drift in temperature and distal bypass targets were verified. Bypass flow was dropped and the aortic crossclamp was applied. Cardioplegia was administered in an antegrade fashion with immediate cessation of cardiac activity. There was a marginal septal temperature response. The root vent suction was turned on high and additional cardioplegia was given via retrograde with an excellent septal temperature response.  The right coronary artery distribution was thoroughly inspected.    The distal right coronary artery was 1.75 mm  in diameter and an arteriotomy was made on the distal portion of this vessel and extended proximally and distally. An impressive lipid laden plaque was present in the vessel down its entire length despite angiogram findings.  The vessel was probed and no focal stenosis was present distally.  An end-to-side anastomosis was performed with running 7-0 Prolene suture and tied down. Cardioplegia was given down the anastomosis via hand injection with no evidence of leak and good blood flow. Verification of vein length was obtained by filling the heart and the vein graft was trimmed to the appropriate length. The first obtuse marginal was heavily diseased on palpation and grossly with lipid laden plaques.  The mid to distal vessel improved, but measured 1 mm in diameter.  An arteriotomy was made on the distal portion of this vessel and extended proximally and distally. An end-to-side anastomosis was performed with running 7-0 Prolene suture and tied down. Cardioplegia was given down the anastomosis via hand injection with no evidence of leak and good blood flow. Verification of vein length was obtained by filling the heart and the vein graft was trimmed to the appropriate length. The LAD was inspected and found to have diffuse lipid laden disease. An arteriotomy was made on the mid LAD and extended proximally and distally on this 1.75 mm diameter vessel. The internal mammary artery was then prepared for grafting by ligating the 2 venous branches along the pedicle using small clips. The mammary artery was trimmed proximal to the bifurcation and beveled for grafting. An end-to-side anastomosis with an 8-0 Prolene suture was constructed and tied down. The bulldog clamp was removed and there was excellent flow within the vessel and adequate filling of the LAD. The underlying mammary fascia was secured to the epicardium using two 6-0 Prolene sutures. Two aortotomies were then made on the proximal ascending aorta and end-to-side  proximal anastomoses were carried out using 6-0 Prolene suture and labeled with a radiopaque washers. A hot shot was given down the ascending aorta after bulldog clamps were applied to the vein grafts. The veins were de-aired and the patient was placed in steep Trendelenburg position. The root vent was turned on high suction and cardiopulmonary bypass flow was turned down with crossclamp subsequently removed. Bypass flows were returned to normal and the bulldog clamps were removed. The heart returned to spontaneous sinus rhythm. The proximal and distal anastomoses were then inspected and found to be hemostatic.   The patient required a very slow wean from bypass given his preoperative wall motion abnormalities.  The heart looked improved on echocardiogram with no vasoactive drugs at the end of the case.  Decannulation was successfully carried out. All cannulation sites were reinforced with additional 4-0 Prolene suture and inspected for hemostasis. Doppler examination of bypass grafts revealed excellent flow within the mammary and vein grafts.  Ventricular pacing wires were placed and secured using 0 silk suture. Three chest tubes were then placed within the left and right pleural spaces and mediastinum. These were secured using a 0 Ethibond suture. The sternum was reapproximated with #7 stainless steel wire and the linea alba was closed with a running 0 Vicryl suture. Subcutaneous tissues were closed with a 2-0 Vicryl suture and the inferior aspect of the incision was closed with additional interrupted 2-0 Vicryl sutures in the dermal layer. The skin was reapproximated with a 4-0 Monocryl subcuticular stitch and overlying skin glue was applied to the incision. Gauze and tape were applied to the chest tube sites and the patient was subsequently transported to the cardiac ICU in stable condition, intubated.        Electronically signed by Panfilo Hamm MD at 03/11/21 0676          Physician Progress Notes (all)       Panfilo Hamm MD at 03/12/21 0734          Cardiothoracic Surgery Progress Note      POD # 1 s/p CABG x 3     LOS: 1 day      Subjective:  No acute events.  Pain controlled.  Denies shortness of breath    Objective:  Vital Signs  Temp:  [98 °F (36.7 °C)-102.8 °F (39.3 °C)] 99.2 °F (37.3 °C)  Heart Rate:  [] 76  Resp:  [12-36] 21  BP: ()/() 97/63  Arterial Line BP: ()/(44-75) 101/52  FiO2 (%):  [40 %] 40 %    Physical Exam:   General Appearance: alert, appears stated age and cooperative   Lungs: clear to auscultation, respirations regular, respirations even and respirations unlabored   Heart: regular rhythm & normal rate, normal S1, S2 and no murmur, no gallop, no rub   Skin: Incision c/d/i     Results:  Results from last 7 days   Lab Units 03/12/21  0319   WBC 10*3/mm3 15.40*   HEMOGLOBIN g/dL 11.5*   HEMATOCRIT % 36.4*   PLATELETS 10*3/mm3 221     Results from last 7 days   Lab Units 03/12/21  0319   SODIUM mmol/L 141   POTASSIUM mmol/L 4.2   CHLORIDE mmol/L 104   CO2 mmol/L 25.0   BUN mg/dL 22*   CREATININE mg/dL 1.55*   GLUCOSE mg/dL 139*   CALCIUM mg/dL 9.0       Assessment:  POD # 1 s/p CABG x 3    Plan:  Wean levophed as tolerated  Keep SBP around 120 for renal protection  Albumin  Keep chest tubes and wires  D/C bessy Hamm MD  03/12/21  07:34 EST          Electronically signed by Panfilo Hamm MD at 03/12/21 0740     David Shah IV, MD at 03/11/21 1311          Cardiology Progress  [facility] Cardiology      Reason for consultation:  · CABG    Requesting provider: Panfilo Hamm MD  Consulting provider: Sourav Shah MD  Primary cardiologist: Nick Johnson MD    IDENTIFICATION: 60-year-old male from [facility]      Active Hospital Problems    Diagnosis  POA   • **Coronary artery disease involving native coronary artery of native heart with unstable angina pectoris (CMS/HCC) [I25.110]  Yes     Priority: High     · Cardiac catheterization  for anterior STEMI (2011): Status post PCI of the proximal LAD.  · Cardiac catheterization for unstable angina (3/8/2021): Severe 3-vessel CAD  · CABG by Panfilo Hamm (3/11/2021): LIMA to LAD, SVG to distal RCA/OM     • Type 2 diabetes mellitus, without long-term current use of insulin (CMS/[facility]) [E11.9]  Yes     Priority: Medium   • Hypertension [I10]  Yes   • Hyperlipidemia LDL goal <70 [E78.5]  Yes   • Ischemic cardiomyopathy [I25.5]  Yes     · Echo (3/8/2021): LVEF 40%.  No significant valve abnormality           Subjective     60-year-old gentleman with history of previous anterior MI who presented to Dr. Johnson's office in February 2021 with new onset anginal symptoms.  The patient was scheduled for outpatient stress testing but presented to [facility] on 3/8/2021 with crescendo anginal symptoms.  He underwent cardiac catheterization which revealed severe 3-vessel CAD (LAD, LCx, RCA).  An echocardiogram was performed and showed EF 40% without significant valvular abnormality.    The patient underwent bypass surgery earlier today with a LIMA to the LAD and SVG to distal RCA/OM branch of the left circumflex.  Thus far, his postoperative course has been uneventful.    No Known Allergies    Prior to Admission medications    Medication Sig Start Date End Date Taking? Authorizing Provider   aspirin 81 MG EC tablet Take 1 tablet by mouth Daily. 2/23/21  Yes Sree Johnson MD   atorvastatin (LIPITOR) 80 MG tablet Take 1 tablet by mouth Daily. 2/23/21  Yes Sree Johnson MD   carvedilol (COREG) 12.5 MG tablet Take 0.5 tablets by mouth 2 (Two) Times a Day With Meals. 2/23/21  Yes Sree Johnson MD   isosorbide mononitrate (IMDUR) 60 MG 24 hr tablet Take 0.5 tablets by mouth Daily. 3/8/21  Yes Sree Johnson MD   lisinopril (PRINIVIL,ZESTRIL) 20 MG tablet Take 1 tablet by mouth Daily.  Patient taking differently: Take 20 mg by mouth Every Night. 2/23/21  Yes Sree Johnson MD   metFORMIN  (Glucophage) 500 MG tablet Take 1 tablet by mouth 2 (Two) Times a Day With Meals. 3/11/21   Sree Johnson MD   nitroglycerin (NITROSTAT) 0.4 MG SL tablet 1 under the tongue as needed for angina, may repeat q5mins for up three doses  Patient taking differently: Place 0.4 mg under the tongue Every 5 (Five) Minutes As Needed. 1 under the tongue as needed for angina, may repeat q5mins for up three doses 2/23/21   Sree Johnson MD       Past Medical History:   Diagnosis Date   • Coronary artery disease    • Heart attack (CMS/HCC) 01/2011   • Hyperlipidemia    • Hypertension    • Type 2 diabetes mellitus (CMS/[facility])        Past Surgical History:   Procedure Laterality Date   • CARDIAC CATHETERIZATION     • CARDIAC CATHETERIZATION N/A 3/8/2021    Procedure: Coronary angiography;  Surgeon: Sree Johnson MD;  Location:  [facility] CATH INVASIVE LOCATION;  Service: Cardiology;  Laterality: N/A;   • CARDIAC CATHETERIZATION N/A 3/8/2021    Procedure: Left Heart Cath;  Surgeon: Sree Johnson MD;  Location: [facility] CATH INVASIVE LOCATION;  Service: Cardiology;  Laterality: N/A;   • COLONOSCOPY  1990   • CORONARY ANGIOPLASTY WITH STENT PLACEMENT  01/2011       Family History   Problem Relation Age of Onset   • Alzheimer's disease Mother    • Bone cancer Mother    • Coronary artery disease Father    • Heart attack Father    • Diabetes Father    • Hypertension Father    • Lung disease Father        Social History     Tobacco Use   Smoking Status Former Smoker   • Years: 7.00   • Types: Cigars   Smokeless Tobacco Never Used   Tobacco Comment    smoked 3 cigars a week;        Social History     Substance and Sexual Activity   Alcohol Use Yes    Comment: rarely          Review of Systems:   Intubated and sedated    Objective     Vital Sign Min/Max for last 24 hours  Temp  Min: 99.6 °F (37.6 °C)  Max: 99.6 °F (37.6 °C)   BP  Min: 141/87  Max: 141/87   Pulse  Min: 86  Max: 86   Resp  Min: 18  Max: 18   SpO2  Min: 97 %  Max:  97 %   No data recorded      Intake/Output Summary (Last 24 hours) at 3/11/2021 1322  Last data filed at 3/11/2021 1300  Gross per 24 hour   Intake 1000 ml   Output 1560 ml   Net -560 ml             Constitutional:       Interventions: Sedated and intubated.   Eyes:      General: No scleral icterus.     Pupils: Pupils are equal, round, and reactive to light.   HENT:      Head: Normocephalic and atraumatic.   Neck:      Thyroid: No thyromegaly.      Vascular: No carotid bruit or JVD.   Pulmonary:      Effort: Intubated.      Breath sounds: Normal breath sounds.   Cardiovascular:      Normal rate. Regular rhythm.      Murmurs: There is no murmur.      No gallop.   Abdominal:      Palpations: Abdomen is soft. There is no abdominal mass.      Tenderness: There is no abdominal tenderness.   Musculoskeletal:      Extremities: No clubbing present.Skin:     General: Skin is warm and dry. There is no cyanosis.          Tele: Sinus    DATA REVIEW:    EKG (3/11/2021): Sinus at 74 bpm.  Anterior septal infarct.  Baseline artifact      ECHO (3/8/2021): LVEF 40% with anterior hypokinesis.  No significant valve abnormality      Results from last 7 days   Lab Units 03/09/21  1619 03/08/21  0844   SODIUM mmol/L 141 138   POTASSIUM mmol/L 4.4 4.2   CHLORIDE mmol/L 101 103   BUN mg/dL 13 21*   CREATININE mg/dL 0.88 1.09   MAGNESIUM mg/dL 2.0  --          Results from last 7 days   Lab Units 03/11/21  1215 03/11/21  1145 03/11/21  1120 03/09/21  1619 03/08/21  0844 03/08/21  0844   WBC 10*3/mm3  --   --   --  12.47*  --  11.35*   HEMOGLOBIN g/dL  --   --   --  15.4  --  15.1   HEMOGLOBIN, POC g/dL 11.9* 11.6* 12.6  --    < >  --    HEMATOCRIT %  --   --   --  47.5  --  46.2   HEMATOCRIT POC % 35* 34* 37*  --    < >  --    PLATELETS 10*3/mm3  --   --   --  376  --  345    < > = values in this interval not displayed.     Lab Results   Component Value Date    HGBA1C 6.60 (H) 03/08/2021     Lab Results   Component Value Date    CHOL 137  03/08/2021    TRIG 88 03/08/2021    HDL 49 03/08/2021    LDL 71 03/08/2021    AST 18 03/09/2021    ALT 17 03/09/2021           Assessment   Coronary disease with unstable angina   · status post CABG, 3/11/2021  · Continue aspirin, beta-blocker, and statin    Chronic systolic heart failure/ischemic cardiomyopathy  · LVEF 40%  · We will transition to metoprolol succinate 25 mg daily once taking oral and hemodynamically stable  · Start Entresto 24-26 mg once BP stable  · Farxiga at discharge for diabetes and CHF    Hyperlipidemia with goal LDL <70  · Controlled  · Continue atorvastatin 80 mg daily    Type 2 diabetes mellitus  · Controlled  · Consider SGLT2 inhibitor therapy at discharge for DM/CHF        Plan     · Routine postoperative care  · Increase atorvastatin back to 80 mg daily (home dose)      Electronically signed by David Shah IV, MD, 03/11/21, 1:14 PM EST.        Electronically signed by David Shah IV, MD at 03/11/21 1508     Audrey Guerra MD at 03/11/21 0808          Critical Care Note     LOS: 0 days   Patient Care Team:  Johan Tse MD as PCP - General (Internal Medicine)  Sree Johnson MD as Cardiologist (Cardiology)    Chief Complaint/Reason for visit:  No chief complaint on file.      Subjective     HPI:  Sony Marks is a 60 y.o. male, former smoker, with past medical history significant for NSTEMI (2011), ischemic cardiomyopathy, hypertension, hyperlipidemia, and diabetes mellitus type 2.  Patient was seen on 3/5/2021 in the cardiology clinic for follow-up of chest pain.  Originally he was seen on 2/21/2021 for chest pain consistent with angina however his condition continued to worsen and by the next visit he had been frequently using sublingual nitroglycerin to ameliorate symptoms.  He additionally complained of shortness of air at rest.  On 3/8/2021 he underwent TTE showing hypokinesis of the mid anterior wall, LVEF 40%.  The same day he  underwent cardiac catheterization showing severe multivessel coronary artery disease with known ischemic cardiomyopathy.  He had a patent stent in the proximal LAD but severe stenosis in the mid LAD at the stent outflow, severe stenosis of the proximal OM1 with a patent stent in the mid OM1 and severe stenosis of the proximal RCA with an elevated LVEDP of 25.  He was referred to cardiothoracic surgery who recommended coronary artery bypass graft.  Preoperative pulmonary function test revealed an FVC of 3.48 L, 70%, FEV1 2.74 L, 73% ratio 78%, no obstruction.  He has a history of smoking cigars for 7 years but has not smoked in many years.    Interval History:     Today he underwent CABG x3.  Verbally I am told that he had some ventricular fibrillation coming off pump requiring amiodarone bolus and shock.  He comes to the ICU on epinephrine drip.  He remains in sinus rhythm.  Pulmonary artery pressures 35/21, cardiac output 5.5, cardiac index 2.4.  Chest tube output 45 mL, urine output 850 mL  Most recent ABG pH 7.40, PCO2 41, PO2 250 on 40%, rate 12, tidal volume 700, PEEP 5, pressure support 10.      Review of Systems:    All systems were reviewed and negative except as noted in subjective.    Medical history, surgical history, social history, family history reviewed    Objective     Intake/Output:    Intake/Output Summary (Last 24 hours) at 3/11/2021 1431  Last data filed at 3/11/2021 1300  Gross per 24 hour   Intake 1000 ml   Output 1595 ml   Net -595 ml       Nutrition:  NPO Diet    Infusions:  dexmedetomidine, 0.2-1.5 mcg/kg/hr  dextrose 5 % with KCl 20 mEq, 30 mL/hr  DOBUTamine, 2-20 mcg/kg/min  DOPamine, 2-20 mcg/kg/min  EPINEPHrine, 0.02-0.3 mcg/kg/min  insulin, 0-50 Units/hr  lactated ringers, 9 mL/hr, Last Rate: 9 mL/hr (03/11/21 0615)  niCARdipine, 5-15 mg/hr  nitroglycerin, 5-200 mcg/min  norepinephrine, 0.02-0.3 mcg/kg/min  phenylephrine, 0.5-3 mcg/kg/min  propofol, 5-50 mcg/kg/min  sodium chloride, 30  mL/hr        Mechanical Ventilator Settings:            Resp Rate (Set): 12  Pressure Support (cm H2O): 10 cm H20  FiO2 (%): 40 %  PEEP/CPAP (cm H2O): 5 cm H20    Minute Ventilation (L/min) (Obs): 8.67 L/min  Resp Rate (Observed) Vent: 12  I:E Ratio (Set): 1:3.00  I:E Ratio (Obs): 1:3.00    PIP Observed (cm H2O): 25 cm H2O  Plateau Pressure (cm H2O): 0 cm H2O    Telemetry: Sinus rhythm             Vital Signs  Blood pressure 141/87, pulse 86, temperature 99.6 °F (37.6 °C), temperature source Temporal, resp. rate 18, SpO2 97 %.    Physical Exam:  General Appearance:   Well-developed middle-aged gentleman, sedated   Head:   Normocephalic, atraumatic   Eyes:          Conjunctiva pink.  Pupils small and equal, no jaundice   Ears:     Throat:  Orally intubated   Neck:  Trachea midline, no crepitus.  Right IJ PA catheter   Back:      Lungs:    Sternotomy incision intact.  Mediastinal tubes with bloody drainage.  Symmetric chest expansion with bilateral breath sounds, no rhonchi or wheeze    Heart:   Regular rhythm, S1, S2 auscultated, no appreciable murmur or rub   Abdomen:    Abdomen is quiet, nondistended, soft    Rectal:   Deferred   Extremities: Ace wrap right leg.  No pretibial edema.  Left radial arterial line.  Left hand warm and pink   Pulses:    Skin:  No rash or cyanosis   Lymph nodes:    Neurologic:  Sedated on propofol      Results Review:     I reviewed the patient's new clinical results.   Results from last 7 days   Lab Units 03/11/21  1311 03/09/21  1619 03/08/21  0844   SODIUM mmol/L 143 141 138   POTASSIUM mmol/L 4.2  4.2 4.4 4.2   CHLORIDE mmol/L 106 101 103   CO2 mmol/L 25.0 28.0 25.0   BUN mg/dL 21* 13 21*   CREATININE mg/dL 1.31* 0.88 1.09   CALCIUM mg/dL 10.6* 9.9 9.3   BILIRUBIN mg/dL  --  0.6  --    ALK PHOS U/L  --  78  --    ALT (SGPT) U/L  --  17  --    AST (SGOT) U/L  --  18  --    GLUCOSE mg/dL 113* 117* 129*     Results from last 7 days   Lab Units 03/11/21  1311 03/11/21  1215  03/11/21  1145 03/09/21  1619 03/08/21  0844 03/08/21  0844   WBC 10*3/mm3 27.29*  --   --  12.47*  --  11.35*   HEMOGLOBIN g/dL 12.8*  --   --  15.4  --  15.1   HEMOGLOBIN, POC g/dL  --  11.9* 11.6*  --    < >  --    HEMATOCRIT % 39.7  --   --  47.5  --  46.2   HEMATOCRIT POC %  --  35* 34*  --    < >  --    PLATELETS 10*3/mm3 225  --   --  376  --  345    < > = values in this interval not displayed.     Results from last 7 days   Lab Units 03/11/21  1335   PH, ARTERIAL pH units 7.401   PO2 ART mm Hg 251.0*   PCO2, ARTERIAL mm Hg 41.7   HCO3 ART mmol/L 25.9     No results found for: BLOODCX  No results found for: URINECX    I reviewed the patient's new imaging including images and reports.    COMPARISON: Chest x-ray 03/09/2021     FINDINGS: Postsurgical appearance of the chest status post median  sternotomy with endotracheal tube well-positioned above the level of the  wade. Esophagogastric tube terminates within the proximal stomach.  Mediastinal and pleural drains in place. Right internal jugular approach  pulmonary arterial catheter terminates at the level near the main  pulmonary artery. Mild central vascular congestion without overt edema.  No pneumothorax or pleural effusion.     IMPRESSION:  Postsurgical appearance of the chest with mild central  vascular congestion as expected without overt edema or pneumothorax. No  pleural effusion. Support hardware in satisfactory position as detailed  above.     D:  03/11/2021    I reviewed his x-ray personally, PA catheter and ET tube in good position.  His vasculature appears normal to me.  There is no pneumothorax or significant effusion.    All medications reviewed.   [START ON 3/12/2021] aspirin, 325 mg, Oral, Daily  atorvastatin, 80 mg, Oral, Nightly  cefuroxime, 1.5 g, Intravenous, Q8H  chlorhexidine, 15 mL, Mouth/Throat, Q12H  EPINEPHrine, 0.02-0.3 mcg/kg/min, Intravenous, Once  insulin (HUMAN R) infusion, 0.1 Units/kg/hr, Intravenous, Once  [START ON  3/12/2021] metoprolol tartrate, 12.5 mg, Oral, Q12H  pharmacy consult - [place], , Does not apply, Daily  protamine, 50 mg, Intravenous, Once  senna-docusate sodium, 2 tablet, Oral, BID          Assessment/Plan       Coronary artery disease involving native coronary artery of native heart with unstable angina pectoris (CMS/HCC)    Hypertension    Hyperlipidemia LDL goal <70    Type 2 diabetes mellitus, without long-term current use of insulin (CMS/[facility])    Ischemic cardiomyopathy    60-year-old gentleman with a known history of coronary disease, previous stents presenting with angina and found to have severe multivessel disease.  He underwent CABG x3 today.  Coming off pump he required amiodarone and shock for some ventricular fibrillation.  He is currently maintaining a sinus rhythm.  Cardiac index is 2.4 on some low-dose epinephrine 0.04 mics per kilogram per minute.  He has scant mediastinal drainage and adequate urine output.  He did not receive blood products except for Cell Saver.  He has a remote history of smoking cigars and normal FEV1 preoperatively.     He has a history of diabetes mellitus type 2, recent onset.  His preoperative A1c is 6.6.  Currently his blood glucose is 100 and he is not requiring an insulin drip.    He is currently requiring some low-dose epinephrine for support.  As an outpatient he was taking carvedilol 12.5 mg, lisinopril 20 mg for control of his hypertension and Lipitor 80 mg for dyslipidemia.        PLAN:    Wean epinephrine as blood pressure allows  Monitor hourly hemodynamic profile, urine output, mediastinal drainage    Monitor for any further arrhythmias    Anticipate that he should wean from mechanical ventilation once he is hemodynamically stable    Aspirin, statin, beta-blocker    Initiate insulin drip per protocol if he develops hyperglycemia    Monitor electrolytes and replace as needed    VTE Prophylaxis: Foot pumps    Stress Ulcer Prophylaxis: None    Audrey Guerra,  MD  03/11/21  14:31 EST      Time: Critical care 35 min  I personally provided care to this critically ill patient as documented above.  Critical care time does not include time spent on separately billed procedures.  None of my critical care time was concurrent with other critical care providers.     Electronically signed by Audrey Guerra MD at 03/11/21 1445       Consult Notes (all)    No notes of this type exist for this encounter.

## 2021-03-12 NOTE — PLAN OF CARE
Goal Outcome Evaluation:  Plan of Care Reviewed With: patient  Progress: improving  Pt Oriented x4, moves everything equally. Pt on 4L NC. Pt SR through the night. Epi titrated per BP. Pt has some anxiety and pressures would drop when he woke up, precedex was titrated through the night and weaned off this morning. Pt has had 1000ml total of albumin. Pt had a fever last night of 102, given tylenol and temp decreased through the night, fever is gone now. Titrating epi and insulin per protocol. Mag replaced. Pt VSS and up in chair this morning. Will continue to monitor.

## 2021-03-12 NOTE — THERAPY EVALUATION
Patient Name: Sony Marks  : 1960    MRN: [id number]                              Today's Date: 3/12/2021       Admit Date: 3/11/2021    Visit Dx:     ICD-10-CM ICD-9-CM   1. CAD in native artery  I25.10 414.01     Patient Active Problem List   Diagnosis   • Coronary artery disease involving native coronary artery of native heart with unstable angina pectoris (CMS/[facility])   • Essential hypertension   • Hyperlipidemia LDL goal <70   • Type 2 diabetes mellitus, without long-term current use of insulin (CMS/[facility])   • Ischemic cardiomyopathy     Past Medical History:   Diagnosis Date   • Coronary artery disease    • Heart attack (CMS/HCC) 2011   • Hyperlipidemia    • Hypertension    • Type 2 diabetes mellitus (CMS/[facility])      Past Surgical History:   Procedure Laterality Date   • CARDIAC CATHETERIZATION     • CARDIAC CATHETERIZATION N/A 3/8/2021    Procedure: Coronary angiography;  Surgeon: Sree Johnson MD;  Location:  [facility];  Service: Cardiology;  Laterality: N/A;   • CARDIAC CATHETERIZATION N/A 3/8/2021    Procedure: Left Heart Cath;  Surgeon: Sree Johnson MD;  Location:  [facility];  Service: Cardiology;  Laterality: N/A;   • COLONOSCOPY     • CORONARY ANGIOPLASTY WITH STENT PLACEMENT  2011   • CORONARY ARTERY BYPASS GRAFT N/A 3/11/2021    Procedure: MEDIAN STERNOTOMY, CORONARY ARTERY BYPASS GRAFTING x3, UTILIZATION OF LEFT INTERAL MAMMARY, EVH OF RIGHT GREATER SAPHENOUS VEIN, GERMAN PER ANESTHESIA;  Surgeon: Panfilo Hamm MD;  Location: [facility];  Service: Cardiothoracic;  Laterality: N/A;  VO: 0845  VR: 0920       General Information     Row Name 21 1433          Physical Therapy Time and Intention    Document Type  evaluation  -RICO     Mode of Treatment  physical therapy  -RICO     Row Name 21 1433          General Information    Patient Profile Reviewed  yes  -RICO     Prior Level of Function  independent:;gait;transfer;bed  mobility;ADL's  -RICO     Existing Precautions/Restrictions  cardiac;fall;oxygen therapy device and L/min;sternal  -RICO     Barriers to Rehab  medically complex  -RICO     Row Name 03/12/21 1433          Living Environment    Lives With  significant other  -RICO     Row Name 03/12/21 1433          Home Main Entrance    Number of Stairs, Main Entrance  none  -RICO     Row Name 03/12/21 1433          Cognition    Orientation Status (Cognition)  oriented x 4  -RICO     Row Name 03/12/21 1433          Safety Issues, Functional Mobility    Safety Issues Affecting Function (Mobility)  safety precautions follow-through/compliance;insight into deficits/self-awareness;awareness of need for assistance;safety precaution awareness  -RICO     Impairments Affecting Function (Mobility)  balance;endurance/activity tolerance;shortness of breath;strength  -RICO     Cognitive Impairments, Mobility Safety/Performance  safety precaution awareness;safety precaution follow-through  -RICO       User Key  (r) = Recorded By, (t) = Taken By, (c) = Cosigned By    Initials Name Provider Type    RICO Sarahi Gill PT Physical Therapist        Mobility     Row Name 03/12/21 1434          Bed Mobility    Comment (Bed Mobility)  patient is OOB and returns to the chair  -[facility] Name 03/12/21 [facility]          Bed-Chair Transfer    Bed-Chair Randolph (Transfers)  minimum assist (75% patient effort);2 person assist  -[facility] Name 03/12/21 1434          Sit-Stand Transfer    Sit-Stand Randolph (Transfers)  minimum assist (75% patient effort);2 person assist  -[facility] Name 03/12/21 1434          Gait/Stairs (Locomotion)    Randolph Level (Gait)  minimum assist (75% patient effort);2 person assist  -RICO     Distance in Feet (Gait)  80  -RICO     Deviations/Abnormal Patterns (Gait)  stride length decreased  -RICO     Bilateral Gait Deviations  forward flexed posture  -RICO     Comment (Gait/Stairs)  patient limited by increased pain with activity  -RICO        User Key  (r) = Recorded By, (t) = Taken By, (c) = Cosigned By    Initials Name Provider Type    Sarahi Link PT Physical Therapist        Obj/Interventions     [facility] Name 03/12/21 1435          Range of Motion Comprehensive    General Range of Motion  no range of motion deficits identified  -RICO     Row Name 03/12/21 1435          Strength Comprehensive (MMT)    General Manual Muscle Testing (MMT) Assessment  no strength deficits identified  -RICO     Row Name 03/12/21 1435          Motor Skills    Therapeutic Exercise  hip;knee;ankle  -RICO     Row Name 03/12/21 1435          Hip (Therapeutic Exercise)    Hip (Therapeutic Exercise)  AROM (active range of motion)  -     Hip AROM (Therapeutic Exercise)  bilateral;flexion;extension;aBduction;aDduction;sitting;10 repetitions  -RICO     Row Name 03/12/21 1435          Knee (Therapeutic Exercise)    Knee (Therapeutic Exercise)  AROM (active range of motion)  -     Knee AROM (Therapeutic Exercise)  bilateral;flexion;extension;sitting;10 repetitions  -RICO     Row Name 03/12/21 1435          Ankle (Therapeutic Exercise)    Ankle (Therapeutic Exercise)  AROM (active range of motion)  -     Ankle AROM (Therapeutic Exercise)  bilateral;dorsiflexion;plantarflexion;10 repetitions;sitting  -RICO     Row Name 03/12/21 1435          Balance    Balance Assessment  sitting static balance;sitting dynamic balance;standing static balance;standing dynamic balance  -     Static Sitting Balance  WFL  -     Dynamic Sitting Balance  mild impairment  -     Static Standing Balance  mild impairment  -     Dynamic Standing Balance  mild impairment  -     Balance Interventions  standing;dynamic reaching;weight shifting activity;dynamic  -       User Key  (r) = Recorded By, (t) = Taken By, (c) = Cosigned By    Initials Name Provider Type    Sarahi Link PT Physical Therapist        Goals/Plan     Row Name 03/12/21 1439          Bed Mobility Goal 1 (PT)     Activity/Assistive Device (Bed Mobility Goal 1, PT)  bed mobility activities, all  -RICO     Bland Level/Cues Needed (Bed Mobility Goal 1, PT)  independent  -RICO     Time Frame (Bed Mobility Goal 1, PT)  long term goal (LTG);10 days  -RICO     Progress/Outcomes (Bed Mobility Goal 1, PT)  goal ongoing  -RICO     Row Name 03/12/21 1439          Transfer Goal 1 (PT)    Activity/Assistive Device (Transfer Goal 1, PT)  sit-to-stand/stand-to-sit  -RICO     Bland Level/Cues Needed (Transfer Goal 1, PT)  independent  -RCIO     Time Frame (Transfer Goal 1, PT)  long term goal (LTG);10 days  -RICO     Progress/Outcome (Transfer Goal 1, PT)  goal ongoing  -RICO     Row Name 03/12/21 1439          Gait Training Goal 1 (PT)    Activity/Assistive Device (Gait Training Goal 1, PT)  gait (walking locomotion)  -RICO     Bland Level (Gait Training Goal 1, PT)  independent  -RICO     Distance (Gait Training Goal 1, PT)  400  -RICO     Time Frame (Gait Training Goal 1, PT)  long term goal (LTG);10 days  -RICO     Progress/Outcome (Gait Training Goal 1, PT)  goal ongoing  -RICO     Row Name 03/12/21 1439          Patient Education Goal (PT)    Activity (Patient Education Goal, PT)  HEP  -RICO     Bland/Cues/Accuracy (Memory Goal 2, PT)  verbalizes understanding  -RICO     Time Frame (Patient Education Goal, PT)  long term goal (LTG);10 days  -RICO     Progress/Outcome (Patient Education Goal, PT)  goal ongoing  -RICO       User Key  (r) = Recorded By, (t) = Taken By, (c) = Cosigned By    Initials Name Provider Type    Sarahi Link, PT Physical Therapist        Clinical Impression     Row Name 03/12/21 1436          Pain    Additional Documentation  Pain Scale: Numbers Pre/Post-Treatment (Group)  -RICO     Row Name 03/12/21 1436          Pain Scale: Numbers Pre/Post-Treatment    Pretreatment Pain Rating  4/10  -RICO     Posttreatment Pain Rating  7/10  -RICO     Pain Location - Orientation  incisional  -RICO     Pain Location  chest  -RICO      Pain Intervention(s)  Repositioned;Ambulation/increased activity;Splinting;Relaxation technique  -RICO     Row Name 03/12/21 1436          Plan of Care Review    Plan of Care Reviewed With  patient  -RICO     Progress  improving  -RICO     Outcome Summary  PT eval is completed, patient presents S/P CABG x3. He demonstrates impaired bed mobility transfers and gait, he was able to stand with min assist and ambulate 80 ft with min assist and cues for pursed lip breathing. anticipate patient to be able to go home with family assist at D/C  -RICO     Row Name 03/12/21 1436          Therapy Assessment/Plan (PT)    Patient/Family Therapy Goals Statement (PT)  return to OF  -     Rehab Potential (PT)  good, to achieve stated therapy goals  -RICO     Criteria for Skilled Interventions Met (PT)  yes;skilled treatment is necessary  -     Row Name 03/12/21 1436          Vital Signs    Pre Systolic BP Rehab  129  -RICO     Pre Treatment Diastolic BP  80  -RICO     Post Systolic BP Rehab  133  -RICO     Post Treatment Diastolic BP  80  -RICO     Pretreatment Heart Rate (beats/min)  78  -RICO     Posttreatment Heart Rate (beats/min)  90  -RICO     Pre SpO2 (%)  93  -RICO     O2 Delivery Pre Treatment  nasal cannula  -RICO     Post SpO2 (%)  91  -RICO     O2 Delivery Post Treatment  nasal cannula  -RICO     Pre Patient Position  Sitting  -RICO     Intra Patient Position  Standing  -RICO     Post Patient Position  Sitting  -RICO     Row Name 03/12/21 1436          Positioning and Restraints    Pre-Treatment Position  sitting in chair/recliner  -RICO     Post Treatment Position  chair  -RICO     In Chair  notified nsg;reclined;sitting;call light within reach;with nsg  -RICO       User Key  (r) = Recorded By, (t) = Taken By, (c) = Cosigned By    Initials Name Provider Type    Sarahi Link, PT Physical Therapist        Outcome Measures     Row Name 03/12/21 1440          How much help from another person do you currently need...    Turning from your back to your  side while in flat bed without using bedrails?  3  -RICO     Moving from lying on back to sitting on the side of a flat bed without bedrails?  3  -RICO     Moving to and from a bed to a chair (including a wheelchair)?  3  -RICO     Standing up from a chair using your arms (e.g., wheelchair, bedside chair)?  3  -RICO     Climbing 3-5 steps with a railing?  2  -RICO     To walk in hospital room?  3  -RICO     AM-PAC 6 Clicks Score (PT)  17  -RICO     Row Name 03/12/21 1440          Functional Assessment    Outcome Measure Options  AM-PAC 6 Clicks Basic Mobility (PT)  -       User Key  (r) = Recorded By, (t) = Taken By, (c) = Cosigned By    Initials Name Provider Type    Sarahi Link PT Physical Therapist        Physical Therapy Education                 Title: PT OT SLP Therapies (In Progress)     Topic: Physical Therapy (In Progress)     Point: Mobility training (In Progress)     Learning Progress Summary           Patient Acceptance, E, NR by RICO at 3/12/2021 1300                   Point: Home exercise program (In Progress)     Learning Progress Summary           Patient Acceptance, E, NR by RICO at 3/12/2021 1300                   Point: Body mechanics (In Progress)     Learning Progress Summary           Patient Acceptance, E, NR by RICO at 3/12/2021 1300                   Point: Precautions (In Progress)     Learning Progress Summary           Patient Acceptance, E, NR by RICO at 3/12/2021 1300                               User Key     Initials Effective Dates Name Provider Type Discipline    RICO 06/19/15 -  Sarahi Gill PT Physical Therapist PT              PT Recommendation and Plan  Planned Therapy Interventions (PT): balance training, bed mobility training, gait training, home exercise program, transfer training, strengthening  Plan of Care Reviewed With: patient  Progress: improving  Outcome Summary: PT eval is completed, patient presents S/P CABG x3. He demonstrates impaired bed mobility transfers and gait,  he was able to stand with min assist and ambulate 80 ft with min assist and cues for pursed lip breathing. anticipate patient to be able to go home with family assist at D/C     Time Calculation:   PT Charges     Row Name 03/12/21 1441             Time Calculation    Start Time  1300  -RICO      PT Received On  03/12/21  -RICO      PT Goal Re-Cert Due Date  03/22/21  -RICO        User Key  (r) = Recorded By, (t) = Taken By, (c) = Cosigned By    Initials Name Provider Type    Sarahi Link PT Physical Therapist        Therapy Charges for Today     Code Description Service Date Service Provider Modifiers Qty    [id number] HC PT EVAL MOD COMPLEXITY 3 3/12/2021 Sarahi Gill PT GP 1          PT G-Codes  Outcome Measure Options: AM-PAC 6 Clicks Basic Mobility (PT)  AM-PAC 6 Clicks Score (PT): 17    Sarahi Gill PT  3/12/2021

## 2021-03-12 NOTE — PROGRESS NOTES
Critical Care Note     LOS: 1 day   Patient Care Team:  Johan Tse MD as PCP - General (Internal Medicine)  Sree Johnson MD as Cardiologist (Cardiology)    Chief Complaint/Reason for visit:    CABG x3  Ischemic cardiomyopathy  Hypertension  Diabetes mellitus type 2      Subjective     HPI:  Sony Marks is a 60 y.o. male, former smoker, with past medical history significant for NSTEMI (2011), ischemic cardiomyopathy, hypertension, hyperlipidemia, and diabetes mellitus type 2.  Patient was seen on 3/5/2021 in the cardiology clinic for follow-up of chest pain.  Originally he was seen on 2/21/2021 for chest pain consistent with angina however his condition continued to worsen and by the next visit he had been frequently using sublingual nitroglycerin to ameliorate symptoms.  He additionally complained of shortness of air at rest.  On 3/8/2021 he underwent TTE showing hypokinesis of the mid anterior wall, LVEF 40%.  The same day he underwent cardiac catheterization showing severe multivessel coronary artery disease with known ischemic cardiomyopathy.  He had a patent stent in the proximal LAD but severe stenosis in the mid LAD at the stent outflow, severe stenosis of the proximal OM1 with a patent stent in the mid OM1 and severe stenosis of the proximal RCA with an elevated LVEDP of 25.  He was referred to cardiothoracic surgery who recommended coronary artery bypass graft.  Preoperative pulmonary function test revealed an FVC of 3.48 L, 70%, FEV1 2.74 L, 73% ratio 78%, no obstruction.  He has a history of smoking cigars for 7 years but has not smoked in many years.  March 11, 2021 he underwent CABG x3.  He initially required an epinephrine drip coming out of surgery.    Interval History:   He had a temp to 102.8 night of surgery but currently is afebrile.  He extubated night of surgery and is currently on 4 L nasal cannula with a saturation of 94%  He required some low-dose epinephrine this  morning at 0.06 mics per kilogram per minute.  Albumin is ordered in an effort to wean epinephrine.  He is having incisional pain and is very anxious this morning.  He denies nausea or vomiting and is tolerating some clear liquids.  Chest tube output 540 mL in 24 hours  Urine output 1945 mL.  Serum creatinine is currently 1.55, unchanged from last night.  Baseline creatinine on admission was 1.09  He is having a lot of pain and gets only temporary relief from medication    Review of Systems:    All systems were reviewed and negative except as noted in subjective.    Medical history, surgical history, social history, family history reviewed    Objective     Intake/Output:    Intake/Output Summary (Last 24 hours) at 3/12/2021 1511  Last data filed at 3/12/2021 0800  Gross per 24 hour   Intake 2702 ml   Output 1305 ml   Net 1397 ml       Nutrition:  Diet Regular; Consistent Carbohydrate    Infusions:  dexmedetomidine, 0.2-1.5 mcg/kg/hr, Last Rate: 0.2 mcg/kg/hr (03/12/21 0967)  EPINEPHrine, 0.02-0.3 mcg/kg/min, Last Rate: 0.04 mcg/kg/min (03/11/21 1350)  lactated ringers, 9 mL/hr, Last Rate: 9 mL/hr (03/11/21 0615)  niCARdipine, 5-15 mg/hr  nitroglycerin, 5-200 mcg/min  norepinephrine, 0.02-0.3 mcg/kg/min  phenylephrine, 0.5-3 mcg/kg/min  sodium chloride, 30 mL/hr        Mechanical Ventilator Settings:            Resp Rate (Set): 12  Pressure Support (cm H2O): (S) 10 cm H20  FiO2 (%): 40 %  PEEP/CPAP (cm H2O): (S) 5 cm H20    Minute Ventilation (L/min) (Obs): 10.1 L/min  Resp Rate (Observed) Vent: 22  I:E Ratio (Set): 1:3.00  I:E Ratio (Obs): 1:3.30    PIP Observed (cm H2O): 15 cm H2O  Plateau Pressure (cm H2O): 0 cm H2O    Telemetry: Sinus rhythm             Vital Signs  Blood pressure 109/68, pulse 77, temperature 97.8 °F (36.6 °C), temperature source Oral, resp. rate 24, SpO2 94 %.    Physical Exam:  General Appearance:   Well-developed middle-aged gentleman, sitting in the chair, anxious   Head:   Normocephalic,  atraumatic   Eyes:          Conjunctiva pink.  Pupils small and equal, no jaundice   Ears:     Throat:  Oral mucosa moist   Neck:  Trachea midline, no crepitus.  Right IJ PA catheter   Back:      Lungs:    Sternotomy incision intact.  Mediastinal tubes with bloody drainage.  Symmetric chest expansion with bilateral breath sounds, no rhonchi or wheeze    Heart:   Regular rhythm, S1, S2 auscultated, no appreciable murmur, soft pericardial rub   Abdomen:    Abdomen is quiet, nondistended, soft    Rectal:   Deferred   Extremities: Ace wrap right leg.  No pretibial edema.  Left radial arterial line.  Left hand warm and pink   Pulses:    Skin:  No rash or cyanosis   Lymph nodes:    Neurologic:  Awake, oriented, no focal weakness, anxious      Results Review:     I reviewed the patient's new clinical results.   Results from last 7 days   Lab Units 03/12/21 0319 03/12/21  0016 03/11/21 2008 03/09/21  1619   SODIUM mmol/L 141 141 141 141   POTASSIUM mmol/L 4.2 4.2 4.0 4.4   CHLORIDE mmol/L 104 104 103 101   CO2 mmol/L 25.0 26.0 25.0 28.0   BUN mg/dL 22* 23* 23* 13   CREATININE mg/dL 1.55* 1.56* 1.57* 0.88   CALCIUM mg/dL 9.0 9.0 9.1 9.9   BILIRUBIN mg/dL  --   --   --  0.6   ALK PHOS U/L  --   --   --  78   ALT (SGPT) U/L  --   --   --  17   AST (SGOT) U/L  --   --   --  18   GLUCOSE mg/dL 139* 165* 181* 117*     Results from last 7 days   Lab Units 03/12/21 0319 03/12/21  0016 03/11/21 2008   WBC 10*3/mm3 15.40* 17.24* 17.02*   HEMOGLOBIN g/dL 11.5* 12.1* 12.1*   HEMATOCRIT % 36.4* 38.0 38.5   PLATELETS 10*3/mm3 221 240 216     Results from last 7 days   Lab Units 03/11/21  1753   PH, ARTERIAL pH units 7.348*   PO2 ART mm Hg 79.0*   PCO2, ARTERIAL mm Hg 47.4*   HCO3 ART mmol/L 26.1*     No results found for: BLOODCX  No results found for: URINECX    I reviewed the patient's new imaging including images and reports.          All medications reviewed.   aspirin, 325 mg, Oral, Daily  atorvastatin, 80 mg, Oral,  Nightly  cefuroxime, 1.5 g, Intravenous, Q8H  famotidine, 20 mg, Intravenous, Q12H  insulin lispro, 0-14 Units, Subcutaneous, TID AC  metoprolol tartrate, 12.5 mg, Oral, Q12H  pharmacy consult - [place], , Does not apply, Daily  senna-docusate sodium, 2 tablet, Oral, BID          Assessment/Plan       Coronary artery disease involving native coronary artery of native heart with unstable angina pectoris (CMS/[facility])    Essential hypertension    Hyperlipidemia LDL goal <70    Type 2 diabetes mellitus, without long-term current use of insulin (CMS/[facility])    Ischemic cardiomyopathy    60-year-old gentleman with a known history of coronary disease, previous stents presenting with angina and found to have severe multivessel disease.  He underwent CABG x3 on March 11.  Coming off pump he required amiodarone and shock for some ventricular fibrillation.  He is currently maintaining a sinus rhythm.  Preoperative ejection fraction was 40%.  He continues to require low-dose epinephrine 0.04 mics per kilogram per minute.  He has scant mediastinal drainage and adequate urine output.  He did not receive blood products except for Cell Saver.  He has a remote history of smoking cigars and normal FEV1 preoperatively.     He had some renal insufficiency with a creatinine of 1.56 preoperatively.  Baseline was 1.09 on admission.  Today's creatinine is unchanged from yesterday at 1.55.  Urine output remains adequate.  Potassium is 4.2.    He has a history of diabetes mellitus type 2, recent onset.  His preoperative A1c is 6.6.  Currently his blood glucose is 120-160 on sliding scale insulin.      PLAN:    Wean epinephrine as blood pressure allows  Monitor rhythm, urine output, mediastinal drainage, oximetry    Encourage incentive spirometry  Ambulate with therapy    Aspirin, statin, beta-blocker    Sliding scale insulin  Advance diet as tolerated    Monitor electrolytes and replace as needed    VTE Prophylaxis: Foot pumps    Stress Ulcer  Prophylaxis: None    Audrey Guerra MD  03/12/21  15:11 EST      Time: Critical care 25 min  I personally provided care to this critically ill patient as documented above.  Critical care time does not include time spent on separately billed procedures.  None of my critical care time was concurrent with other critical care providers.

## 2021-03-12 NOTE — PROGRESS NOTES
Cardiology Progress Note      Reason for visit:    · Multivessel CAD status post CABG  · Hypotension  · Ischemic cardiomyopathy    IDENTIFICATION: 60-year-old male who resides in [facility]    Diagnosis  POA   • **Coronary artery disease involving native coronary artery of native heart with unstable angina pectoris (CMS/HCC) [I25.110]  Yes     · Cardiac catheterization for anterior STEMI (2011): Status post PCI of the proximal LAD.  · Cardiac catheterization for unstable angina (3/8/2021): Severe 3-vessel CAD  · CABG by Panfilo Hamm (3/11/2021): LIMA to LAD, SVG to distal RCA/OM     • Hypertension [I10]  Yes   • Hyperlipidemia LDL goal <70 [E78.5]  Yes   • Type 2 diabetes mellitus, without long-term current use of insulin (CMS/[facility]) [E11.9]  Yes   • Ischemic cardiomyopathy [I25.5]  Yes     · Echo (3/8/2021): LVEF 40%.  No significant valve abnormality              Patient was successfully extubated yesterday and he is currently sitting up in the chair without complaints.  He remains on epi drip.           Vital Sign Min/Max for last 24 hours  Temp  Min: 98 °F (36.7 °C)  Max: 102.8 °F (39.3 °C)   BP  Min: 86/70  Max: 168/149   Pulse  Min: 67  Max: 101   Resp  Min: 12  Max: 36   SpO2  Min: 91 %  Max: 100 %   Flow (L/min)  Min: 4  Max: 4      Intake/Output Summary (Last 24 hours) at 3/12/2021 0752  Last data filed at 3/12/2021 0600  Gross per 24 hour   Intake 3582 ml   Output 3195 ml   Net 387 ml           Physical Exam  Constitutional:       Appearance: He is well-developed.   HENT:      Head: Normocephalic.   Neck:      Vascular: No carotid bruit or JVD.   Cardiovascular:      Rate and Rhythm: Normal rate and regular rhythm.      Heart sounds: Normal heart sounds. No murmur. No friction rub. No gallop.    Pulmonary:      Effort: Pulmonary effort is normal.      Breath sounds: Normal breath sounds.   Abdominal:      General: Bowel sounds are normal. There is no distension.       Palpations: Abdomen is soft.   Skin:     General: Skin is warm and dry.   Neurological:      Mental Status: He is alert and oriented to person, place, and time.         Tele: NSR    Results Review (reviewed the patient's recent labs in the electronic medical record):     EKG (3/12/2021): Normal sinus rhythm    CXR (3/11/2021): Postsurgical appearance of chest with mild vascular congestion without overt edema or pneumothorax.    ECHO (3/8/2021): LVEF 40%.  Hypokinesis of mid anterior wall and apex.  Grade 1 diastolic dysfunction.  Mild calcification of aortic valve without significant stenosis    Result Review:  I have personally reviewed the results from the time of this admission to 03/12/21 7:52 AM EST and agree with these findings:  [x]  Laboratory  []  Microbiology  [x]  Radiology  [x]  EKG/Telemetry   [x]  Cardiology/Vascular   []  Pathology  []  Old records  []  Other:  Most notable findings include: INR 1.22, BUN 22, creatinine 1.55, WBC 15.4, hemoglobin 11.5, hematocrit 36.4    Results from last 7 days   Lab Units 03/12/21 0319 03/12/21 0016 03/11/21 2008 03/11/21  1618 03/11/21  1311 03/09/21  1619 03/08/21  0844 03/08/21  0844   SODIUM mmol/L 141 141 141 140 143 141  --  138   POTASSIUM mmol/L 4.2 4.2 4.0 4.3 4.2  4.2 4.4  --  4.2   CHLORIDE mmol/L 104 104 103 103 106 101  --  103   BUN mg/dL 22* 23* 23* 22* 21* 13  --  21*   CREATININE mg/dL 1.55* 1.56* 1.57* 1.42* 1.31* 0.88  --  1.09   MAGNESIUM mg/dL 3.0* 2.7* 1.9 1.9 2.1 2.0   < >  --     < > = values in this interval not displayed.         Results from last 7 days   Lab Units 03/12/21 0319 03/12/21 0016 03/11/21 2008   WBC 10*3/mm3 15.40* 17.24* 17.02*   HEMOGLOBIN g/dL 11.5* 12.1* 12.1*   HEMATOCRIT % 36.4* 38.0 38.5   PLATELETS 10*3/mm3 221 240 216       Lab Results   Component Value Date    HGBA1C 6.60 (H) 03/08/2021       Lab Results   Component Value Date    CHOL 137 03/08/2021    TRIG 88 03/08/2021    HDL 49 03/08/2021    LDL 71  03/08/2021              Coronary disease with unstable angina   · status post CABG, 3/11/2021  · Continue aspirin, beta-blocker, and statin     Chronic systolic heart failure/ischemic cardiomyopathy  · LVEF 40%  · We will transition to metoprolol succinate 25 mg daily once taking oral and hemodynamically stable  · Start Entresto 24-26 mg once BP stable  · Farxiga at discharge for diabetes and CHF     Hyperlipidemia with goal LDL <70  · Controlled  · Continue atorvastatin 80 mg daily     Type 2 diabetes mellitus  · Controlled  · Consider SGLT2 inhibitor therapy at discharge for DM/CHF             · Wean epi drip as patient can tolerate  · Once pressure more stable will initiate guideline directed medical therapy for his cardiomyopathy/heart failure  · Continue aspirin statin and beta-blocker    Electronically signed by SHANEL Gregory, 03/12/21, 7:52 AM EST.

## 2021-03-13 ENCOUNTER — APPOINTMENT (OUTPATIENT)
Dept: GENERAL RADIOLOGY | Facility: HOSPITAL | Age: 61
End: 2021-03-13

## 2021-03-13 LAB
ANION GAP SERPL CALCULATED.3IONS-SCNC: 8 MMOL/L (ref 5–15)
BH BB BLOOD EXPIRATION DATE: NORMAL
BH BB BLOOD EXPIRATION DATE: NORMAL
BH BB BLOOD TYPE BARCODE: 6200
BH BB BLOOD TYPE BARCODE: 6200
BH BB DISPENSE STATUS: NORMAL
BH BB DISPENSE STATUS: NORMAL
BH BB PRODUCT CODE: NORMAL
BH BB PRODUCT CODE: NORMAL
BH BB UNIT NUMBER: NORMAL
BH BB UNIT NUMBER: NORMAL
BUN SERPL-MCNC: 26 MG/DL (ref 6–20)
BUN/CREAT SERPL: 22.8 (ref 7–25)
CALCIUM SPEC-SCNC: 8.9 MG/DL (ref 8.6–10.5)
CHLORIDE SERPL-SCNC: 98 MMOL/L (ref 98–107)
CO2 SERPL-SCNC: 27 MMOL/L (ref 22–29)
CREAT SERPL-MCNC: 1.14 MG/DL (ref 0.76–1.27)
CROSSMATCH INTERPRETATION: NORMAL
CROSSMATCH INTERPRETATION: NORMAL
DEPRECATED RDW RBC AUTO: 44.4 FL (ref 37–54)
ERYTHROCYTE [DISTWIDTH] IN BLOOD BY AUTOMATED COUNT: 12.6 % (ref 12.3–15.4)
GFR SERPL CREATININE-BSD FRML MDRD: 66 ML/MIN/1.73
GLUCOSE BLDC GLUCOMTR-MCNC: 133 MG/DL (ref 70–130)
GLUCOSE BLDC GLUCOMTR-MCNC: 140 MG/DL (ref 70–130)
GLUCOSE BLDC GLUCOMTR-MCNC: 161 MG/DL (ref 70–130)
GLUCOSE BLDC GLUCOMTR-MCNC: 171 MG/DL (ref 70–130)
GLUCOSE SERPL-MCNC: 144 MG/DL (ref 65–99)
HCT VFR BLD AUTO: 34.1 % (ref 37.5–51)
HGB BLD-MCNC: 10.7 G/DL (ref 13–17.7)
MCH RBC QN AUTO: 30 PG (ref 26.6–33)
MCHC RBC AUTO-ENTMCNC: 31.4 G/DL (ref 31.5–35.7)
MCV RBC AUTO: 95.5 FL (ref 79–97)
PLATELET # BLD AUTO: 174 10*3/MM3 (ref 140–450)
PMV BLD AUTO: 10.4 FL (ref 6–12)
POTASSIUM SERPL-SCNC: 4.3 MMOL/L (ref 3.5–5.2)
RBC # BLD AUTO: 3.57 10*6/MM3 (ref 4.14–5.8)
SODIUM SERPL-SCNC: 133 MMOL/L (ref 136–145)
UNIT  ABO: NORMAL
UNIT  ABO: NORMAL
UNIT  RH: NORMAL
UNIT  RH: NORMAL
WBC # BLD AUTO: 18.49 10*3/MM3 (ref 3.4–10.8)

## 2021-03-13 PROCEDURE — 25010000003 CEFUROXIME SODIUM 1.5 G RECONSTITUTED SOLUTION: Performed by: PHYSICIAN ASSISTANT

## 2021-03-13 PROCEDURE — 97530 THERAPEUTIC ACTIVITIES: CPT

## 2021-03-13 PROCEDURE — 25010000002 HYDROMORPHONE 1 MG/ML SOLUTION: Performed by: PHYSICIAN ASSISTANT

## 2021-03-13 PROCEDURE — 71045 X-RAY EXAM CHEST 1 VIEW: CPT

## 2021-03-13 PROCEDURE — 25010000002 KETOROLAC TROMETHAMINE PER 15 MG: Performed by: INTERNAL MEDICINE

## 2021-03-13 PROCEDURE — 85027 COMPLETE CBC AUTOMATED: CPT | Performed by: PHYSICIAN ASSISTANT

## 2021-03-13 PROCEDURE — 99231 SBSQ HOSP IP/OBS SF/LOW 25: CPT | Performed by: NURSE PRACTITIONER

## 2021-03-13 PROCEDURE — 93005 ELECTROCARDIOGRAM TRACING: CPT | Performed by: THORACIC SURGERY (CARDIOTHORACIC VASCULAR SURGERY)

## 2021-03-13 PROCEDURE — 25010000002 MORPHINE PER 10 MG: Performed by: THORACIC SURGERY (CARDIOTHORACIC VASCULAR SURGERY)

## 2021-03-13 PROCEDURE — 99233 SBSQ HOSP IP/OBS HIGH 50: CPT | Performed by: INTERNAL MEDICINE

## 2021-03-13 PROCEDURE — 25010000002 AMIODARONE IN DEXTROSE 5% 150-4.21 MG/100ML-% SOLUTION

## 2021-03-13 PROCEDURE — 82962 GLUCOSE BLOOD TEST: CPT

## 2021-03-13 PROCEDURE — 25010000002 AMIODARONE IN DEXTROSE 5% 360-4.14 MG/200ML-% SOLUTION: Performed by: NURSE PRACTITIONER

## 2021-03-13 PROCEDURE — 63710000001 INSULIN LISPRO (HUMAN) PER 5 UNITS

## 2021-03-13 PROCEDURE — 93005 ELECTROCARDIOGRAM TRACING: CPT | Performed by: PHYSICIAN ASSISTANT

## 2021-03-13 PROCEDURE — 80048 BASIC METABOLIC PNL TOTAL CA: CPT | Performed by: PHYSICIAN ASSISTANT

## 2021-03-13 RX ORDER — AMIODARONE HYDROCHLORIDE 200 MG/1
200 TABLET ORAL DAILY
Status: DISCONTINUED | OUTPATIENT
Start: 2021-04-05 | End: 2021-03-15

## 2021-03-13 RX ORDER — AMIODARONE HYDROCHLORIDE 200 MG/1
200 TABLET ORAL ONCE
Status: COMPLETED | OUTPATIENT
Start: 2021-03-14 | End: 2021-03-14

## 2021-03-13 RX ORDER — KETOROLAC TROMETHAMINE 30 MG/ML
30 INJECTION, SOLUTION INTRAMUSCULAR; INTRAVENOUS ONCE AS NEEDED
Status: COMPLETED | OUTPATIENT
Start: 2021-03-13 | End: 2021-03-13

## 2021-03-13 RX ORDER — AMIODARONE HYDROCHLORIDE 200 MG/1
200 TABLET ORAL EVERY 12 HOURS
Status: DISCONTINUED | OUTPATIENT
Start: 2021-03-21 | End: 2021-03-15

## 2021-03-13 RX ORDER — AMIODARONE HYDROCHLORIDE 200 MG/1
200 TABLET ORAL EVERY 8 HOURS
Status: DISCONTINUED | OUTPATIENT
Start: 2021-03-15 | End: 2021-03-15

## 2021-03-13 RX ADMIN — MORPHINE SULFATE 2 MG: 4 INJECTION, SOLUTION INTRAMUSCULAR; INTRAVENOUS at 01:17

## 2021-03-13 RX ADMIN — AMIODARONE HYDROCHLORIDE 1 MG/MIN: 1.8 INJECTION, SOLUTION INTRAVENOUS at 19:14

## 2021-03-13 RX ADMIN — HYDROCODONE BITARTRATE AND ACETAMINOPHEN 1 TABLET: 7.5; 325 TABLET ORAL at 01:16

## 2021-03-13 RX ADMIN — ATORVASTATIN CALCIUM 80 MG: 40 TABLET, FILM COATED ORAL at 19:55

## 2021-03-13 RX ADMIN — FAMOTIDINE 20 MG: 20 TABLET, FILM COATED ORAL at 18:02

## 2021-03-13 RX ADMIN — HYDROMORPHONE HYDROCHLORIDE 0.5 MG: 1 INJECTION, SOLUTION INTRAMUSCULAR; INTRAVENOUS; SUBCUTANEOUS at 22:22

## 2021-03-13 RX ADMIN — AMIODARONE HYDROCHLORIDE 150 MG: 1.5 INJECTION, SOLUTION INTRAVENOUS at 19:01

## 2021-03-13 RX ADMIN — METOPROLOL TARTRATE 12.5 MG: 25 TABLET, FILM COATED ORAL at 18:50

## 2021-03-13 RX ADMIN — OXYCODONE 10 MG: 5 TABLET ORAL at 19:55

## 2021-03-13 RX ADMIN — DOCUSATE SODIUM 50MG AND SENNOSIDES 8.6MG 2 TABLET: 8.6; 5 TABLET, FILM COATED ORAL at 08:38

## 2021-03-13 RX ADMIN — ASPIRIN 325 MG: 325 TABLET, COATED ORAL at 08:38

## 2021-03-13 RX ADMIN — OXYCODONE 10 MG: 5 TABLET ORAL at 08:38

## 2021-03-13 RX ADMIN — OXYCODONE 10 MG: 5 TABLET ORAL at 15:00

## 2021-03-13 RX ADMIN — KETOROLAC TROMETHAMINE 30 MG: 30 INJECTION, SOLUTION INTRAMUSCULAR; INTRAVENOUS at 08:50

## 2021-03-13 RX ADMIN — HYDROMORPHONE HYDROCHLORIDE 0.5 MG: 1 INJECTION, SOLUTION INTRAMUSCULAR; INTRAVENOUS; SUBCUTANEOUS at 05:08

## 2021-03-13 RX ADMIN — CEFUROXIME SODIUM 1.5 G: 1.5 INJECTION, POWDER, FOR SOLUTION INTRAVENOUS at 04:55

## 2021-03-13 RX ADMIN — INSULIN LISPRO 3 UNITS: 100 INJECTION, SOLUTION INTRAVENOUS; SUBCUTANEOUS at 08:38

## 2021-03-13 RX ADMIN — FAMOTIDINE 20 MG: 20 TABLET, FILM COATED ORAL at 06:08

## 2021-03-13 RX ADMIN — DOCUSATE SODIUM 50MG AND SENNOSIDES 8.6MG 2 TABLET: 8.6; 5 TABLET, FILM COATED ORAL at 19:55

## 2021-03-13 NOTE — PLAN OF CARE
Goal Outcome Evaluation:  Plan of Care Reviewed With: patient   Pt alert oriented Resp unlabored 4 L nasal in use. Lungs coarse throughout and decreased an bases. NSR on monitor no ectopy. V/S stable epi off. Chest tubes remain without air leak. [place] and art line dc/d as well as garcia. Cordis remains.  Re gular ADA diet with minimal intake.  Yet to void. Pt had anxiety attack this am with pain. Unable to calm so precedex and xanax started. After this pt became calm and was able to cough and deep breath and use his IS. Was unable to ambulate this AM but walked later for 50 ft. Precedex has remain at 0.2. All incisions dry and intact.  Rub continues. Xanax was only given once. Continue to monitor

## 2021-03-13 NOTE — PLAN OF CARE
Goal Outcome Evaluation:  Plan of Care Reviewed With: patient  Progress: improving  Outcome Summary: Pt increased ambulation distance to 220ft with CGA. VC's for upright posture. Pt demonstrated improved balance and coordination. Mobility limited by c/o mild incisional pain and SOA. Continue to progress as appropriate.

## 2021-03-13 NOTE — PROGRESS NOTES
Cardiothoracic Surgery Progress Note      POD # 2 s/p CABG x 3     LOS: 2 days      Subjective:  No acute events.  Pain controlled.  Denies shortness of breath    Objective:  Vital Signs  Temp:  [97.7 °F (36.5 °C)-98.9 °F (37.2 °C)] 98.3 °F (36.8 °C)  Heart Rate:  [75-98] 90  Resp:  [18-28] 20  BP: ()/() 127/77  Arterial Line BP: ()/(56-80) 114/66    Physical Exam:   General Appearance: alert, appears stated age and cooperative   Lungs: clear to auscultation, respirations regular, respirations even and respirations unlabored   Heart: regular rhythm & normal rate, normal S1, S2 and no murmur, no gallop, no rub   Skin: Incision c/d/i     Results:    Results from last 7 days   Lab Units 03/13/21  0355   WBC 10*3/mm3 18.49*   HEMOGLOBIN g/dL 10.7*   HEMATOCRIT % 34.1*   PLATELETS 10*3/mm3 174     Results from last 7 days   Lab Units 03/13/21  0355   SODIUM mmol/L 133*   POTASSIUM mmol/L 4.3   CHLORIDE mmol/L 98   CO2 mmol/L 27.0   BUN mg/dL 26*   CREATININE mg/dL 1.14   GLUCOSE mg/dL 144*   CALCIUM mg/dL 8.9       Assessment:  POD # 2 s/p CABG x 3    Plan:  Creatinine 1.14  Off Levophed  Wean Precedex as able  Discontinue chest tubes as he is ambulating minimal drainage  Stable for transfer once off Precedex  Aspirin, statin, beta-blocker    Hieu Meza MD  03/13/21  11:40 EST

## 2021-03-13 NOTE — PROGRESS NOTES
Cardiology Progress Note      Reason for visit:    · Multivessel CAD status post CABG  · Hypotension  · Ischemic cardiomyopathy    IDENTIFICATION: 60-year-old gentleman who resides in [facility]    Diagnosis  POA   • **Coronary artery disease involving native coronary artery of native heart with unstable angina pectoris (CMS/HCC) [I25.110]  Yes     Priority: High     · Cardiac catheterization for anterior STEMI (2011): Status post PCI of the proximal LAD.  · Cardiac catheterization for unstable angina (3/8/2021): Severe 3-vessel CAD  · CABG by Panfilo Hamm (3/11/2021): LIMA to LAD, SVG to distal RCA/OM     • Type 2 diabetes mellitus, without long-term current use of insulin (CMS/[facility]) [E11.9]  Yes     Priority: High   • Ischemic cardiomyopathy [I25.5]  Yes     Priority: High     · Echo (3/8/2021): LVEF 40%.  No significant valve abnormality     • Essential hypertension [I10]  Yes     Priority: Medium   • Hyperlipidemia LDL goal <70 [E78.5]  Yes     Priority: Medium            No events noted overnight.  Patient was able to work with PT yesterday.  He is sitting up in the chair reports feeling better today than yesterday.  Is on O2 at 3 L via nasal cannula with acceptable O2 saturations.  Levophed drip was able to be weaned.         Vital Sign Min/Max for last 24 hours  Temp  Min: 97.7 °F (36.5 °C)  Max: 98.9 °F (37.2 °C)   BP  Min: 83/52  Max: 137/70   Pulse  Min: 75  Max: 98   Resp  Min: 18  Max: 28   SpO2  Min: 90 %  Max: 96 %   Flow (L/min)  Min: 3  Max: 4      Intake/Output Summary (Last 24 hours) at 3/13/2021 1019  Last data filed at 3/13/2021 0800  Gross per 24 hour   Intake 2366 ml   Output 725 ml   Net 1641 ml           Physical Exam  Constitutional:       Appearance: He is well-developed.   HENT:      Head: Normocephalic.   Neck:      Vascular: No carotid bruit or JVD.   Cardiovascular:      Rate and Rhythm: Normal rate and regular rhythm.      Heart sounds: Normal heart  sounds. No murmur. No friction rub. No gallop.    Pulmonary:      Effort: Pulmonary effort is normal.      Breath sounds: Normal breath sounds.   Abdominal:      General: Bowel sounds are normal. There is no distension.      Palpations: Abdomen is soft.   Skin:     General: Skin is warm and dry.   Neurological:      Mental Status: He is alert and oriented to person, place, and time.         Tele: NSR     Results Review (reviewed the patient's recent labs in the electronic medical record):      EKG (3/12/2021): Normal sinus rhythm     CXR (3/11/2021): Postsurgical appearance of chest with mild vascular congestion without overt edema or pneumothorax.     ECHO (3/8/2021): LVEF 40%.  Hypokinesis of mid anterior wall and apex.  Grade 1 diastolic dysfunction.  Mild calcification of aortic valve without significant stenosis     Result Review:  I have personally reviewed the results from the time of this admission to 03/13/21 10:19 AM EST and agree with these findings:  [x]  Laboratory  []  Microbiology  [x]  Radiology  [x]  EKG/Telemetry   [x]  Cardiology/Vascular   []  Pathology  []  Old records  []  Other:  Most notable findings include: Sodium 133, hemoglobin 10.7, hematocrit 34.1, WBC 18.49    Results from last 7 days   Lab Units 03/13/21  0355 03/12/21 0319 03/12/21  0016 03/11/21 2008 03/11/21  1618 03/11/21  1311 03/09/21  1619   SODIUM mmol/L 133* 141 141 141 140 143 141   POTASSIUM mmol/L 4.3 4.2 4.2 4.0 4.3 4.2  4.2 4.4   CHLORIDE mmol/L 98 104 104 103 103 106 101   BUN mg/dL 26* 22* 23* 23* 22* 21* 13   CREATININE mg/dL 1.14 1.55* 1.56* 1.57* 1.42* 1.31* 0.88   MAGNESIUM mg/dL  --  3.0* 2.7* 1.9 1.9 2.1 2.0         Results from last 7 days   Lab Units 03/13/21  0355 03/12/21 0319 03/12/21  0016   WBC 10*3/mm3 18.49* 15.40* 17.24*   HEMOGLOBIN g/dL 10.7* 11.5* 12.1*   HEMATOCRIT % 34.1* 36.4* 38.0   PLATELETS 10*3/mm3 174 221 240       Lab Results   Component Value Date    HGBA1C 6.60 (H) 03/08/2021       Lab  Results   Component Value Date    CHOL 137 03/08/2021    TRIG 88 03/08/2021    HDL 49 03/08/2021    LDL 71 03/08/2021                Coronary disease with unstable angina   · status post CABG, 3/11/2021  · Continue aspirin, beta-blocker, and statin     Chronic systolic heart failure/ischemic cardiomyopathy  · LVEF 40%  · We will transition to metoprolol succinate 25 mg daily once taking oral and hemodynamically stable  · Start Entresto 24-26 mg once BP stable  · Farxiga at discharge for diabetes and CHF     Hyperlipidemia with goal LDL <70  · Controlled  · Continue atorvastatin 80 mg daily     Type 2 diabetes mellitus  · Controlled  · Consider SGLT2 inhibitor therapy at discharge for DM/CHF                    · Aspirin, statin and beta-blocker  · Blood pressure stable but low at 92/65 so will defer starting Entresto at this time    Electronically signed by SHANEL Gregory, 03/13/21, 10:19 AM EST.

## 2021-03-13 NOTE — PLAN OF CARE
Goal Outcome Evaluation:  Plan of Care Reviewed With: patient     Outcome Summary: 36hrs post op CABG x 3:  neurologicially intact, walked 200ft, BP 90's most of night, NSR 70-80's.  Minimal drainage from MT/PT's.  Pain is very diff to control, ,then becomes very anxious, tachypneic grunts.  Currently on Naina's, IV morphine, IV Precedex, and trying IV Dilaudid, Creatinine 1.14 this am.  No appetite, drinks water, garcia dc'd, has not voided, bladder scanned twice and still < 300ml seen.  Will attempt to void this am when standing.  All incisions well approx, Cl & dry

## 2021-03-13 NOTE — PROGRESS NOTES
Critical Care Note     LOS: 2 days   Patient Care Team:  Johan Tse MD as PCP - General (Internal Medicine)  Sree Johnson MD as Cardiologist (Cardiology)    Chief Complaint/Reason for visit:    CABG x3  Ischemic cardiomyopathy  Hypertension  Diabetes mellitus type 2      Subjective     HPI:  Sony Marks is a 60 y.o. male, former smoker, with past medical history significant for NSTEMI (2011), ischemic cardiomyopathy, hypertension, hyperlipidemia, and diabetes mellitus type 2.  Patient was seen on 3/5/2021 in the cardiology clinic for follow-up of chest pain.  Originally he was seen on 2/21/2021 for chest pain consistent with angina however his condition continued to worsen and by the next visit he had been frequently using sublingual nitroglycerin to ameliorate symptoms.  He additionally complained of shortness of air at rest.  On 3/8/2021 he underwent TTE showing hypokinesis of the mid anterior wall, LVEF 40%.  The same day he underwent cardiac catheterization showing severe multivessel coronary artery disease with known ischemic cardiomyopathy.  He had a patent stent in the proximal LAD but severe stenosis in the mid LAD at the stent outflow, severe stenosis of the proximal OM1 with a patent stent in the mid OM1 and severe stenosis of the proximal RCA with an elevated LVEDP of 25.  He was referred to cardiothoracic surgery who recommended coronary artery bypass graft.  Preoperative pulmonary function test revealed an FVC of 3.48 L, 70%, FEV1 2.74 L, 73% ratio 78%, no obstruction.  He has a history of smoking cigars for 7 years but has not smoked in many years.  March 11, 2021 he underwent CABG x3.  He initially required an epinephrine drip coming out of surgery. He extubated night of surgery. He had some mild renal insufficiency with creatinine of 1.55 but good urine output. He required some low-dose epinephrine in the first 24 hours post surgery.    Interval History:   Precedex drip  started for anxiety and he is currently on 0.2 mics per kilogram per hour  Currently in the chair. Continues to have incisional pain.  Serum creatinine has normalized at 1.14. Urine output 2286 mL  Mediastinal tube output 380 mL  Only able to ambulate 80 feet with therapy yesterday  3 L nasal cannula saturation 94%    Review of Systems:    All systems were reviewed and negative except as noted in subjective.    Medical history, surgical history, social history, family history reviewed    Objective     Intake/Output:    Intake/Output Summary (Last 24 hours) at 3/13/2021 1104  Last data filed at 3/13/2021 0800  Gross per 24 hour   Intake 2366 ml   Output 850 ml   Net 1516 ml       Nutrition:  Diet Regular; Consistent Carbohydrate    Infusions:  dexmedetomidine, 0.2-1.5 mcg/kg/hr, Last Rate: 0.2 mcg/kg/hr (03/13/21 0430)  EPINEPHrine, 0.02-0.3 mcg/kg/min, Last Rate: Stopped (03/12/21 1800)  lactated ringers, 9 mL/hr, Last Rate: 9 mL/hr (03/11/21 0615)  niCARdipine, 5-15 mg/hr  nitroglycerin, 5-200 mcg/min  norepinephrine, 0.02-0.3 mcg/kg/min  phenylephrine, 0.5-3 mcg/kg/min  sodium chloride, 30 mL/hr          Telemetry: Sinus rhythm             Vital Signs  Blood pressure 127/77, pulse 90, temperature 98.3 °F (36.8 °C), resp. rate 20, SpO2 94 %.    Physical Exam:  General Appearance:   Well-developed middle-aged gentleman, sitting in the chair, anxious   Head:   Normocephalic, atraumatic   Eyes:          Conjunctiva pink.  Pupils small and equal, no jaundice   Ears:     Throat:  Oral mucosa moist   Neck:  Trachea midline, no crepitus.  Right IJ introducer   Back:      Lungs:    Sternotomy incision intact.  Mediastinal tubes with bloody drainage, decreased.  Symmetric chest expansion with bilateral breath sounds, no rhonchi or wheeze    Heart:   Regular rhythm, S1, S2 auscultated, no appreciable murmur, soft pericardial rub   Abdomen:    Abdomen is quiet, nondistended, soft    Rectal:   Deferred   Extremities:  Right  leg incisions intact with minimal ecchymosis.  No pretibial edema.     Pulses:  Pedal pulses present   Skin:  No rash or cyanosis   Lymph nodes:    Neurologic:  Awake, oriented, no focal weakness, conversant      Results Review:     I reviewed the patient's new clinical results.   Results from last 7 days   Lab Units 03/13/21  0355 03/12/21  0319 03/12/21  0016 03/09/21  1619   SODIUM mmol/L 133* 141 141 141   POTASSIUM mmol/L 4.3 4.2 4.2 4.4   CHLORIDE mmol/L 98 104 104 101   CO2 mmol/L 27.0 25.0 26.0 28.0   BUN mg/dL 26* 22* 23* 13   CREATININE mg/dL 1.14 1.55* 1.56* 0.88   CALCIUM mg/dL 8.9 9.0 9.0 9.9   BILIRUBIN mg/dL  --   --   --  0.6   ALK PHOS U/L  --   --   --  78   ALT (SGPT) U/L  --   --   --  17   AST (SGOT) U/L  --   --   --  18   GLUCOSE mg/dL 144* 139* 165* 117*     Results from last 7 days   Lab Units 03/13/21  0355 03/12/21  0319 03/12/21  0016   WBC 10*3/mm3 18.49* 15.40* 17.24*   HEMOGLOBIN g/dL 10.7* 11.5* 12.1*   HEMATOCRIT % 34.1* 36.4* 38.0   PLATELETS 10*3/mm3 174 221 240     Results from last 7 days   Lab Units 03/11/21  1753   PH, ARTERIAL pH units 7.348*   PO2 ART mm Hg 79.0*   PCO2, ARTERIAL mm Hg 47.4*   HCO3 ART mmol/L 26.1*     No results found for: BLOODCX  No results found for: URINECX    I reviewed the patient's new imaging including images and reports.    COMPARISON: Chest x-ray dated 3/12/2021     FINDINGS: Pulmonary arterial catheter has been removed with sheath  remaining in place. Drains are in place. Degenerative osseous changes.  Low lung volumes with enlarged cardiac silhouette. No pleural effusion.  No pneumothorax.      IMPRESSION:  Interval removal of the pulmonary arterial catheter. Low  lung volumes with crowded pulmonary markings.     This report was finalized on 3/13/2021 8:28 AM by Christian Capps.      All medications reviewed.   aspirin, 325 mg, Oral, Daily  atorvastatin, 80 mg, Oral, Nightly  famotidine, 20 mg, Oral, BID AC  insulin lispro, 0-14 Units, Subcutaneous,  TID AC  metoprolol tartrate, 12.5 mg, Oral, Q12H  pharmacy consult - [facility], , Does not apply, Daily  senna-docusate sodium, 2 tablet, Oral, BID          Assessment/Plan       Coronary artery disease involving native coronary artery of native heart with unstable angina pectoris (CMS/HCC)    Essential hypertension    Hyperlipidemia LDL goal <70    Type 2 diabetes mellitus, without long-term current use of insulin (CMS/HCC)    Ischemic cardiomyopathy    60-year-old gentleman with a known history of coronary disease, previous stents presenting with angina and found to have severe multivessel disease.  He underwent CABG x3 on March 11.  Coming off pump he required amiodarone and shock for some ventricular fibrillation.  He is currently maintaining a sinus rhythm.  Preoperative ejection fraction was 40%.  He was initially on an epinephrine drip but this has been weaned. He has scant mediastinal drainage and adequate urine output.  He did not receive blood products except for Cell Saver.  He has a remote history of smoking cigars and normal FEV1 preoperatively.     He had some renal insufficiency with a creatinine of 1.56 preoperatively.  Baseline was 1.09 on admission.  Today's creatinine is improved at 1.14.  Urine output remains adequate.  Potassium is 4.3.    He has a history of diabetes mellitus type 2, recent onset.  His preoperative A1c is 6.6.  Currently his blood glucose is 140-170 on sliding scale insulin. He is tolerating a p.o. diet no further nausea      PLAN:    Continue pain management  Precedex has been successful at treating his anxiety     Mobilize with therapy    One dose of Toradol for chest pain    Aspirin, statin, beta-blocker    Sliding scale insulin    Monitor electrolytes and replace as needed    VTE Prophylaxis: Foot pumps    Stress Ulcer Prophylaxis: Rj Guerra MD  03/13/21  11:04 EST      Time: 25 minutes

## 2021-03-13 NOTE — THERAPY TREATMENT NOTE
Patient Name: Sony Marks  : 1960    MRN: [id number]                              Today's Date: 3/13/2021       Admit Date: 3/11/2021    Visit Dx:     ICD-10-CM ICD-9-CM   1. CAD in native artery  I25.10 414.01     Patient Active Problem List   Diagnosis   • Coronary artery disease involving native coronary artery of native heart with unstable angina pectoris (CMS/[facility])   • Essential hypertension   • Hyperlipidemia LDL goal <70   • Type 2 diabetes mellitus, without long-term current use of insulin (CMS/[facility])   • Ischemic cardiomyopathy     Past Medical History:   Diagnosis Date   • Coronary artery disease    • Heart attack (CMS/HCC) 2011   • Hyperlipidemia    • Hypertension    • Type 2 diabetes mellitus (CMS/[facility])      Past Surgical History:   Procedure Laterality Date   • CARDIAC CATHETERIZATION     • CARDIAC CATHETERIZATION N/A 3/8/2021    Procedure: Coronary angiography;  Surgeon: Sree Johnson MD;  Location:  [facility];  Service: Cardiology;  Laterality: N/A;   • CARDIAC CATHETERIZATION N/A 3/8/2021    Procedure: Left Heart Cath;  Surgeon: Sree Johnson MD;  Location:  [facility];  Service: Cardiology;  Laterality: N/A;   • COLONOSCOPY     • CORONARY ANGIOPLASTY WITH STENT PLACEMENT  2011   • CORONARY ARTERY BYPASS GRAFT N/A 3/11/2021    Procedure: MEDIAN STERNOTOMY, CORONARY ARTERY BYPASS GRAFTING x3, UTILIZATION OF LEFT INTERAL MAMMARY, EVH OF RIGHT GREATER SAPHENOUS VEIN, GERMAN PER ANESTHESIA;  Surgeon: Panfilo Hamm MD;  Location: [facility];  Service: Cardiothoracic;  Laterality: N/A;  VO: 0845  VR: 0920       General Information     Row Name 21 1539          Physical Therapy Time and Intention    Document Type  therapy note (daily note)  -KG     Mode of Treatment  physical therapy  -KG     Row Name 21 1539          General Information    Existing Precautions/Restrictions  cardiac;fall;oxygen therapy device and L/min;sternal   -KG     Row Name 03/13/21 1539          Cognition    Orientation Status (Cognition)  oriented x 4  -KG     Row Name 03/13/21 1539          Safety Issues, Functional Mobility    Safety Issues Affecting Function (Mobility)  insight into deficits/self-awareness;safety precaution awareness;safety precautions follow-through/compliance  -KG     Impairments Affecting Function (Mobility)  balance;endurance/activity tolerance;pain;shortness of breath;strength  -KG       User Key  (r) = Recorded By, (t) = Taken By, (c) = Cosigned By    Initials Name Provider Type    KG Mahsa Hawthorne PT Physical Therapist        Mobility     Row Name 03/13/21 1540          Bed Mobility    Comment (Bed Mobility)  UIC  -KG     Row Name 03/13/21 1540          Transfers    Comment (Transfers)  VC's for sequencing and safe hand placement to maintain sternal precautions.  -KG     Row Name 03/13/21 1540          Sit-Stand Transfer    Sit-Stand [place] (Transfers)  minimum assist (75% patient effort);verbal cues  -KG     [facility] Name 03/13/21 1540          Gait/Stairs (Locomotion)    [place] Level (Gait)  contact guard;verbal cues  -KG     Distance in Feet (Gait)  220  -KG     Deviations/Abnormal Patterns (Gait)  gustavo decreased;stride length decreased  -KG     Bilateral Gait Deviations  forward flexed posture  -KG     Comment (Gait/Stairs)  Pt demonstrated step through gait pattern with slow gustavo. VC's for upright posture. Pt demonstrated good balance and stability. Did not require any rest breaks; c/o mild incisional pain and SOA.  -KG       User Key  (r) = Recorded By, (t) = Taken By, (c) = Cosigned By    Initials Name Provider Type    Mahsa Aragon PT Physical Therapist        Obj/Interventions     Row Name 03/13/21 1541          Balance    Balance Assessment  sitting static balance;standing static balance;standing dynamic balance  -KG     Static Sitting Balance  WFL;sitting in chair  -KG     Static Standing Balance   WFL;supported;standing  -KG     Dynamic Standing Balance  mild impairment;supported;standing  -KG       User Key  (r) = Recorded By, (t) = Taken By, (c) = Cosigned By    Initials Name Provider Type    KG Mahsa Hawthorne, PT Physical Therapist        Goals/Plan    No documentation.       Clinical Impression     [facility] Name 03/13/21 1541          Pain    Additional Documentation  Pain Scale: Numbers Pre/Post-Treatment (Group)  -KG     Row Name 03/13/21 1541          Pain Scale: Numbers Pre/Post-Treatment    Pretreatment Pain Rating  3/10  -KG     Posttreatment Pain Rating  3/10  -KG     Pain Location - Orientation  incisional  -KG     Pain Location  chest  -KG     Pain Intervention(s)  Repositioned;Ambulation/increased activity  -KG     [facility] Name 03/13/21 1541          Plan of Care Review    Plan of Care Reviewed With  patient  -KG     Progress  improving  -KG     Outcome Summary  Pt increased ambulation distance to 220ft with CGA. VC's for upright posture. Pt demonstrated improved balance and coordination. Mobility limited by c/o mild incisional pain and SOA. Continue to progress as appropriate.  -KG     Row Name 03/13/21 1541          Vital Signs    Pre Systolic BP Rehab  102  -KG     Pre Treatment Diastolic BP  71  -KG     Post Systolic BP Rehab  127  -KG     Post Treatment Diastolic BP  77  -KG     Pretreatment Heart Rate (beats/min)  83  -KG     Posttreatment Heart Rate (beats/min)  90  -KG     Pre SpO2 (%)  94  -KG     O2 Delivery Pre Treatment  supplemental O2  -KG     Post SpO2 (%)  94  -KG     O2 Delivery Post Treatment  supplemental O2  -KG     Pre Patient Position  Sitting  -KG     Intra Patient Position  Standing  -KG     Post Patient Position  Sitting  -KG     Row Name 03/13/21 1541          Positioning and Restraints    Pre-Treatment Position  sitting in chair/recliner  -KG     Post Treatment Position  chair  -KG     In Chair  notified nsg;reclined;call light within reach;encouraged to call for  assist;RUE elevated;LUE elevated;legs elevated  -KG       User Key  (r) = Recorded By, (t) = Taken By, (c) = Cosigned By    Initials Name Provider Type    Mahsa Aragon PT Physical Therapist        Outcome Measures     Row Name 03/13/21 1542          How much help from another person do you currently need...    Turning from your back to your side while in flat bed without using bedrails?  3  -KG     Moving from lying on back to sitting on the side of a flat bed without bedrails?  3  -KG     Moving to and from a bed to a chair (including a wheelchair)?  3  -KG     Standing up from a chair using your arms (e.g., wheelchair, bedside chair)?  3  -KG     Climbing 3-5 steps with a railing?  2  -KG     To walk in hospital room?  3  -KG     AM-PAC 6 Clicks Score (PT)  17  -KG     Row Name 03/13/21 1542          Functional Assessment    Outcome Measure Options  AM-PAC 6 Clicks Basic Mobility (PT)  -KG       User Key  (r) = Recorded By, (t) = Taken By, (c) = Cosigned By    Initials Name Provider Type    Mahsa Aragon PT Physical Therapist        Physical Therapy Education                 Title: PT OT SLP Therapies (In Progress)     Topic: Physical Therapy (In Progress)     Point: Mobility training (In Progress)     Learning Progress Summary           Patient Acceptance, E, NR by KG at 3/13/2021 1046    Acceptance, E, NR by RICO at 3/12/2021 1300                   Point: Home exercise program (In Progress)     Learning Progress Summary           Patient Acceptance, E, NR by KG at 3/13/2021 1046    Acceptance, E, NR by RICO at 3/12/2021 1300                   Point: Body mechanics (In Progress)     Learning Progress Summary           Patient Acceptance, E, NR by KG at 3/13/2021 1046    Acceptance, E, NR by RICO at 3/12/2021 1300                   Point: Precautions (In Progress)     Learning Progress Summary           Patient Acceptance, E, NR by KG at 3/13/2021 1046    Acceptance, E, NR by RICO at 3/12/2021  1300                               User Key     Initials Effective Dates Name Provider Type Discipline    RICO 06/19/15 -  Sarahi Gill, PT Physical Therapist PT    KG 05/22/20 -  Mahsa Hawthorne, PT Physical Therapist PT              PT Recommendation and Plan     Plan of Care Reviewed With: patient  Progress: improving  Outcome Summary: Pt increased ambulation distance to 220ft with CGA. VC's for upright posture. Pt demonstrated improved balance and coordination. Mobility limited by c/o mild incisional pain and SOA. Continue to progress as appropriate.     Time Calculation:   PT Charges     Row Name 03/13/21 1046             Time Calculation    Start Time  1046  -KG      PT Received On  03/13/21  -KG      PT Goal Re-Cert Due Date  03/22/21  -KG         Time Calculation- PT    Total Timed Code Minutes- PT  24 minute(s)  -KG         Timed Charges    27648 - PT Therapeutic Activity Minutes  24  -KG        User Key  (r) = Recorded By, (t) = Taken By, (c) = Cosigned By    Initials Name Provider Type    KG Mahsa Hawthorne, PT Physical Therapist        Therapy Charges for Today     Code Description Service Date Service Provider Modifiers Qty    [id number]  PT THERAPEUTIC ACT EA 15 MIN 3/13/2021 Mahsa Hawthorne, PT GP 2          PT G-Codes  Outcome Measure Options: AM-PAC 6 Clicks Basic Mobility (PT)  AM-PAC 6 Clicks Score (PT): 17    Magali Hawthorne, PT  3/13/2021

## 2021-03-14 ENCOUNTER — APPOINTMENT (OUTPATIENT)
Dept: GENERAL RADIOLOGY | Facility: HOSPITAL | Age: 61
End: 2021-03-14

## 2021-03-14 PROBLEM — I48.91 ATRIAL FIBRILLATION WITH RVR (HCC): Status: ACTIVE | Noted: 2021-03-14

## 2021-03-14 LAB
ANION GAP SERPL CALCULATED.3IONS-SCNC: 8 MMOL/L (ref 5–15)
BUN SERPL-MCNC: 26 MG/DL (ref 6–20)
BUN/CREAT SERPL: 24.1 (ref 7–25)
CALCIUM SPEC-SCNC: 8.6 MG/DL (ref 8.6–10.5)
CHLORIDE SERPL-SCNC: 99 MMOL/L (ref 98–107)
CO2 SERPL-SCNC: 29 MMOL/L (ref 22–29)
CREAT SERPL-MCNC: 1.08 MG/DL (ref 0.76–1.27)
DEPRECATED RDW RBC AUTO: 42.8 FL (ref 37–54)
ERYTHROCYTE [DISTWIDTH] IN BLOOD BY AUTOMATED COUNT: 12.3 % (ref 12.3–15.4)
GFR SERPL CREATININE-BSD FRML MDRD: 70 ML/MIN/1.73
GLUCOSE BLDC GLUCOMTR-MCNC: 133 MG/DL (ref 70–130)
GLUCOSE BLDC GLUCOMTR-MCNC: 134 MG/DL (ref 70–130)
GLUCOSE BLDC GLUCOMTR-MCNC: 149 MG/DL (ref 70–130)
GLUCOSE BLDC GLUCOMTR-MCNC: 155 MG/DL (ref 70–130)
GLUCOSE SERPL-MCNC: 167 MG/DL (ref 65–99)
HCT VFR BLD AUTO: 34.4 % (ref 37.5–51)
HGB BLD-MCNC: 10.9 G/DL (ref 13–17.7)
MCH RBC QN AUTO: 30.4 PG (ref 26.6–33)
MCHC RBC AUTO-ENTMCNC: 31.7 G/DL (ref 31.5–35.7)
MCV RBC AUTO: 96.1 FL (ref 79–97)
PLATELET # BLD AUTO: 205 10*3/MM3 (ref 140–450)
PMV BLD AUTO: 10.6 FL (ref 6–12)
POTASSIUM SERPL-SCNC: 4.3 MMOL/L (ref 3.5–5.2)
RBC # BLD AUTO: 3.58 10*6/MM3 (ref 4.14–5.8)
SODIUM SERPL-SCNC: 136 MMOL/L (ref 136–145)
WBC # BLD AUTO: 18.51 10*3/MM3 (ref 3.4–10.8)

## 2021-03-14 PROCEDURE — 93010 ELECTROCARDIOGRAM REPORT: CPT | Performed by: INTERNAL MEDICINE

## 2021-03-14 PROCEDURE — 97530 THERAPEUTIC ACTIVITIES: CPT

## 2021-03-14 PROCEDURE — 25010000002 HYDROMORPHONE 1 MG/ML SOLUTION: Performed by: PHYSICIAN ASSISTANT

## 2021-03-14 PROCEDURE — 25010000002 AMIODARONE IN DEXTROSE 5% 360-4.14 MG/200ML-% SOLUTION: Performed by: NURSE PRACTITIONER

## 2021-03-14 PROCEDURE — 63710000001 INSULIN LISPRO (HUMAN) PER 5 UNITS

## 2021-03-14 PROCEDURE — 25010000002 PHENYLEPHRINE 10 MG/ML SOLUTION: Performed by: PHYSICIAN ASSISTANT

## 2021-03-14 PROCEDURE — 99232 SBSQ HOSP IP/OBS MODERATE 35: CPT | Performed by: INTERNAL MEDICINE

## 2021-03-14 PROCEDURE — 71045 X-RAY EXAM CHEST 1 VIEW: CPT

## 2021-03-14 PROCEDURE — 80048 BASIC METABOLIC PNL TOTAL CA: CPT | Performed by: PHYSICIAN ASSISTANT

## 2021-03-14 PROCEDURE — 82962 GLUCOSE BLOOD TEST: CPT

## 2021-03-14 PROCEDURE — 93005 ELECTROCARDIOGRAM TRACING: CPT | Performed by: NURSE PRACTITIONER

## 2021-03-14 PROCEDURE — 85027 COMPLETE CBC AUTOMATED: CPT | Performed by: PHYSICIAN ASSISTANT

## 2021-03-14 RX ORDER — MORPHINE SULFATE 4 MG/ML
2 INJECTION, SOLUTION INTRAMUSCULAR; INTRAVENOUS
Status: DISCONTINUED | OUTPATIENT
Start: 2021-03-14 | End: 2021-03-16 | Stop reason: HOSPADM

## 2021-03-14 RX ADMIN — FAMOTIDINE 20 MG: 20 TABLET, FILM COATED ORAL at 08:16

## 2021-03-14 RX ADMIN — HYDROCODONE BITARTRATE AND ACETAMINOPHEN 1 TABLET: 7.5; 325 TABLET ORAL at 21:38

## 2021-03-14 RX ADMIN — HYDROCODONE BITARTRATE AND ACETAMINOPHEN 1 TABLET: 7.5; 325 TABLET ORAL at 13:15

## 2021-03-14 RX ADMIN — INSULIN LISPRO 3 UNITS: 100 INJECTION, SOLUTION INTRAVENOUS; SUBCUTANEOUS at 08:16

## 2021-03-14 RX ADMIN — OXYCODONE 10 MG: 5 TABLET ORAL at 10:19

## 2021-03-14 RX ADMIN — AMIODARONE HYDROCHLORIDE 0.5 MG/MIN: 1.8 INJECTION, SOLUTION INTRAVENOUS at 12:29

## 2021-03-14 RX ADMIN — OXYCODONE 10 MG: 5 TABLET ORAL at 17:20

## 2021-03-14 RX ADMIN — DOCUSATE SODIUM 50MG AND SENNOSIDES 8.6MG 2 TABLET: 8.6; 5 TABLET, FILM COATED ORAL at 21:37

## 2021-03-14 RX ADMIN — ALPRAZOLAM 0.5 MG: 0.5 TABLET ORAL at 21:37

## 2021-03-14 RX ADMIN — ATORVASTATIN CALCIUM 80 MG: 40 TABLET, FILM COATED ORAL at 21:38

## 2021-03-14 RX ADMIN — FAMOTIDINE 20 MG: 20 TABLET, FILM COATED ORAL at 17:20

## 2021-03-14 RX ADMIN — AMIODARONE HYDROCHLORIDE 200 MG: 200 TABLET ORAL at 21:38

## 2021-03-14 RX ADMIN — ASPIRIN 325 MG: 325 TABLET, COATED ORAL at 08:16

## 2021-03-14 RX ADMIN — DOCUSATE SODIUM 50MG AND SENNOSIDES 8.6MG 2 TABLET: 8.6; 5 TABLET, FILM COATED ORAL at 08:16

## 2021-03-14 RX ADMIN — METOPROLOL TARTRATE 25 MG: 25 TABLET, FILM COATED ORAL at 21:38

## 2021-03-14 RX ADMIN — HYDROMORPHONE HYDROCHLORIDE 1 MG: 1 INJECTION, SOLUTION INTRAMUSCULAR; INTRAVENOUS; SUBCUTANEOUS at 05:07

## 2021-03-14 RX ADMIN — PHENYLEPHRINE HYDROCHLORIDE 0.3 MCG/KG/MIN: 10 INJECTION, SOLUTION INTRAVENOUS at 00:43

## 2021-03-14 RX ADMIN — AMIODARONE HYDROCHLORIDE 1 MG/MIN: 1.8 INJECTION, SOLUTION INTRAVENOUS at 03:00

## 2021-03-14 RX ADMIN — OXYCODONE 10 MG: 5 TABLET ORAL at 05:05

## 2021-03-14 NOTE — PLAN OF CARE
Goal Outcome Evaluation:  Plan of Care Reviewed With: patient   Went into rapid A Fib 140-170 at 1835. Ekg obtained for confirmation. Spoke with DANTE Silver. New orders for Amio protocol. At 1900 remains in A Fib. Remains alert and oriented. Resp without distress lungs coarse throughout. O2 3 liters in use. Chest tubes pulled today. Toradol given today with much better pain control and less anxiety. Precedex off this AM. Ambulated times two today 220ft each time. Incisions intact and clean. Poor appetite today. Cordis and 18g saline lock in place.  ACHS remains. Continue to monitor for changes

## 2021-03-14 NOTE — PROGRESS NOTES
Cardiology Progress Note      Reason for visit:    · Multivessel CAD status post CABG  · Hypotension  · Atrial fibrillation with RVR postop CABG  · Ischemic cardiomyopathy    IDENTIFICATION: 60-year-old gentleman who resides in [facility] Hospital Problems    Diagnosis  POA   • **Coronary artery disease involving native coronary artery of native heart with unstable angina pectoris (CMS/HCC) [I25.110]  Yes     Priority: High     · Cardiac catheterization for anterior STEMI (2011): Status post PCI of the proximal LAD.  · Cardiac catheterization for unstable angina (3/8/2021): Severe 3-vessel CAD  · CABG by Panfilo Hamm (3/11/2021): LIMA to LAD, SVG to distal RCA/OM     • Type 2 diabetes mellitus, without long-term current use of insulin (CMS/[facility]) [E11.9]  Yes     Priority: Medium   • Atrial fibrillation with RVR (CMS/[facility]) [I48.91]  Yes     · Occurred in the setting of postop CABG 3/13/2021  · Converted with IV amiodarone  · Chads Vasc=4      • Essential hypertension [I10]  Yes   • Hyperlipidemia LDL goal <70 [E78.5]  Yes   • Ischemic cardiomyopathy [I25.5]  Yes     · Echo (3/8/2021): LVEF 40%.  No significant valve abnormality              · Short episode of atrial fibrillation last night that resolved with amiodarone  · Patient ambulating the hallway  · Not doing well with incentive spirometer         Vital Sign Min/Max for last 24 hours  Temp  Min: 97.7 °F (36.5 °C)  Max: 98.7 °F (37.1 °C)   BP  Min: 70/49  Max: 130/85   Pulse  Min: 68  Max: 165   Resp  Min: 18  Max: 20   SpO2  Min: 91 %  Max: 98 %   Flow (L/min)  Min: 2  Max: 3      Intake/Output Summary (Last 24 hours) at 3/14/2021 1108  Last data filed at 3/14/2021 0800  Gross per 24 hour   Intake 979 ml   Output 1570 ml   Net -591 ml           Physical Exam  Constitutional:       Appearance: Normal appearance.   HENT:      Head: Normocephalic.   Cardiovascular:      Rate and Rhythm: Normal rate and regular rhythm.      Heart sounds: No murmur.    Pulmonary:      Breath sounds: Examination of the right-lower field reveals decreased breath sounds. Examination of the left-lower field reveals decreased breath sounds. Decreased breath sounds present.   Neurological:      Mental Status: He is alert.          Tele: NSR     Results Review (reviewed the patient's recent labs in the electronic medical record):      EKG (3/12/2021): Normal sinus rhythm     CXR (3/14/2021): Cardiomegaly and low lung volumes with atelectasis     ECHO (3/8/2021): LVEF 40%.  Hypokinesis of mid anterior wall and apex.  Grade 1 diastolic dysfunction.  Mild calcification of aortic valve without significant stenosis         Results from last 7 days   Lab Units 03/14/21  0408 03/13/21  0355 03/12/21  0319 03/12/21  0016 03/11/21 2008 03/11/21  1618 03/11/21  1311   SODIUM mmol/L 136 133* 141 141 141 140 143   POTASSIUM mmol/L 4.3 4.3 4.2 4.2 4.0 4.3 4.2  4.2   CHLORIDE mmol/L 99 98 104 104 103 103 106   BUN mg/dL 26* 26* 22* 23* 23* 22* 21*   CREATININE mg/dL 1.08 1.14 1.55* 1.56* 1.57* 1.42* 1.31*   MAGNESIUM mg/dL  --   --  3.0* 2.7* 1.9 1.9 2.1         Results from last 7 days   Lab Units 03/14/21  0408 03/13/21  0355 03/12/21 0319   WBC 10*3/mm3 18.51* 18.49* 15.40*   HEMOGLOBIN g/dL 10.9* 10.7* 11.5*   HEMATOCRIT % 34.4* 34.1* 36.4*   PLATELETS 10*3/mm3 205 174 221       Lab Results   Component Value Date    HGBA1C 6.60 (H) 03/08/2021       Lab Results   Component Value Date    CHOL 137 03/08/2021    TRIG 88 03/08/2021    HDL 49 03/08/2021    LDL 71 03/08/2021              Coronary disease with unstable angina   · status post CABG, 3/11/2021  · Continue aspirin, beta-blocker, and statin    Postoperative atrial fibrillation with RVR  · Occurred on 3/13/2021  · Converted with IV amiodarone  · Currently maintaining normal sinus rhythm     Chronic systolic heart failure/ischemic cardiomyopathy  · LVEF 40%  · We will transition to metoprolol succinate 25 mg daily once taking oral and  hemodynamically stable  · Start Entresto 24-26 mg once BP stable  · Farxiga at discharge for diabetes and CHF     Hyperlipidemia with goal LDL <70  · Controlled  · Continue atorvastatin 80 mg daily     Type 2 diabetes mellitus  · Controlled  · Consider SGLT2 inhibitor therapy at discharge for DM/CHF                      · Continue aspirin, beta-blocker and statin  · Continue IV amiodarone with transition from IV to p.o. taper protocol  · Transfer to telemetry    Electronically signed by David Shah IV, MD, 03/14/21, 11:09 AM EDT.

## 2021-03-14 NOTE — THERAPY TREATMENT NOTE
Patient Name: Sony Marks  : 1960    MRN: [id number]                              Today's Date: 3/14/2021       Admit Date: 3/11/2021    Visit Dx:     ICD-10-CM ICD-9-CM   1. CAD in native artery  I25.10 414.01     Patient Active Problem List   Diagnosis   • Coronary artery disease involving native coronary artery of native heart with unstable angina pectoris (CMS/[facility])   • Essential hypertension   • Hyperlipidemia LDL goal <70   • Type 2 diabetes mellitus, without long-term current use of insulin (CMS/[facility])   • Ischemic cardiomyopathy   • Atrial fibrillation with RVR (CMS/[facility])     Past Medical History:   Diagnosis Date   • Coronary artery disease    • Heart attack (CMS/[facility]) 2011   • Hyperlipidemia    • Hypertension    • Type 2 diabetes mellitus (CMS/[facility])      Past Surgical History:   Procedure Laterality Date   • CARDIAC CATHETERIZATION     • CARDIAC CATHETERIZATION N/A 3/8/2021    Procedure: Coronary angiography;  Surgeon: Sree Johnson MD;  Location:  [facility] CATH INVASIVE LOCATION;  Service: Cardiology;  Laterality: N/A;   • CARDIAC CATHETERIZATION N/A 3/8/2021    Procedure: Left Heart Cath;  Surgeon: Sree Johnson MD;  Location: [facility] CATH INVASIVE LOCATION;  Service: Cardiology;  Laterality: N/A;   • COLONOSCOPY     • CORONARY ANGIOPLASTY WITH STENT PLACEMENT  2011   • CORONARY ARTERY BYPASS GRAFT N/A 3/11/2021    Procedure: MEDIAN STERNOTOMY, CORONARY ARTERY BYPASS GRAFTING x3, UTILIZATION OF LEFT INTERAL MAMMARY, EVH OF RIGHT GREATER SAPHENOUS VEIN, GERMAN PER ANESTHESIA;  Surgeon: Panfilo Hamm MD;  Location: [facility];  Service: Cardiothoracic;  Laterality: N/A;  VO: 0845  VR: 0920       General Information     Row Name 21 1133          Physical Therapy Time and Intention    Document Type  therapy note (daily note)  -KG     Mode of Treatment  physical therapy  -KG     Row Name 21 1133          General Information    Existing Precautions/Restrictions   cardiac;fall;oxygen therapy device and L/min;sternal  -KG     Row Name 03/14/21 1133          Cognition    Orientation Status (Cognition)  oriented x 4  -KG     Row Name 03/14/21 1133          Safety Issues, Functional Mobility    Safety Issues Affecting Function (Mobility)  safety precaution awareness;safety precautions follow-through/compliance  -KG     Impairments Affecting Function (Mobility)  balance;endurance/activity tolerance;pain;shortness of breath;strength  -KG       User Key  (r) = Recorded By, (t) = Taken By, (c) = Cosigned By    Initials Name Provider Type    KG Mahsa Hawthorne, PT Physical Therapist        Mobility     Row Name 03/14/21 1134          Bed Mobility    Comment (Bed Mobility)  UIC  -KG     Row Name 03/14/21 1134          Transfers    Comment (Transfers)  VC's for sequencing. Pt able to maintain sternal precautions.  -KG     Row Name 03/14/21 1134          Sit-Stand Transfer    Sit-Stand [place] (Transfers)  minimum assist (75% patient effort);verbal cues  -KG     Row Name 03/14/21 1134          Gait/Stairs (Locomotion)    [place] Level (Gait)  contact guard;1 person assist;1 person to manage equipment;verbal cues  -KG     Distance in Feet (Gait)  330  -KG     Deviations/Abnormal Patterns (Gait)  gustavo decreased;stride length decreased  -KG     Bilateral Gait Deviations  forward flexed posture  -KG     Comment (Gait/Stairs)  Pt demonstrated step through gait pattern with slow gustavo and decreased step length. Improved gustavo with continued forward ambulation. Pt demonstrated good balance and stability. Able to increase ambulation distance this date.  -KG       User Key  (r) = Recorded By, (t) = Taken By, (c) = Cosigned By    Initials Name Provider Type    KG Mahsa Hawthorne, PT Physical Therapist        Obj/Interventions     Row Name 03/14/21 1143          Balance    Balance Assessment  sitting static balance;standing static balance;standing dynamic balance  -KG      Static Sitting Balance  WFL;sitting in chair  -KG     Static Standing Balance  WFL;supported;standing  -KG     Dynamic Standing Balance  WFL;supported;standing  -KG       User Key  (r) = Recorded By, (t) = Taken By, (c) = Cosigned By    Initials Name Provider Type    KG Mahsa Hawthorne N, PT Physical Therapist        Goals/Plan    No documentation.       Clinical Impression     Row Name 03/14/21 1143          Pain    Additional Documentation  Pain Scale: Numbers Pre/Post-Treatment (Group)  -KG     Row Name 03/14/21 1143          Pain Scale: Numbers Pre/Post-Treatment    Pretreatment Pain Rating  3/10  -KG     Posttreatment Pain Rating  3/10  -KG     Pain Location - Orientation  incisional  -KG     Pain Location  chest  -KG     Pain Intervention(s)  Repositioned;Ambulation/increased activity  -KG     Row Name 03/14/21 1143          Plan of Care Review    Plan of Care Reviewed With  patient  -KG     Progress  improving  -KG     Outcome Summary  Pt increased ambulation distance to 330ft with CGA 1+1. Pt demonstrated good balance and stability. Improved gustavo with continued forward ambulation. Pt's mobility limited by fatigue. Continue to progress as appropriate.  -KG     Row Name 03/14/21 1143          Vital Signs    Pre Systolic BP Rehab  121  -KG     Pre Treatment Diastolic BP  73  -KG     Post Systolic BP Rehab  132  -KG     Post Treatment Diastolic BP  77  -KG     Pretreatment Heart Rate (beats/min)  81  -KG     Posttreatment Heart Rate (beats/min)  87  -KG     Pre SpO2 (%)  95  -KG     O2 Delivery Pre Treatment  supplemental O2  -KG     Post SpO2 (%)  96  -KG     O2 Delivery Post Treatment  supplemental O2  -KG     Pre Patient Position  Sitting  -KG     Intra Patient Position  Standing  -KG     Post Patient Position  Sitting  -KG     Row Name 03/14/21 1145          Positioning and Restraints    Pre-Treatment Position  sitting in chair/recliner  -KG     Post Treatment Position  chair  -KG     In Chair   notified nsg;reclined;call light within reach;encouraged to call for assist;RUE elevated;LUE elevated;legs elevated  -KG       User Key  (r) = Recorded By, (t) = Taken By, (c) = Cosigned By    Initials Name Provider Type    Mahsa Aragon PT Physical Therapist        Outcome Measures     Row Name 03/14/21 1146          How much help from another person do you currently need...    Turning from your back to your side while in flat bed without using bedrails?  3  -KG     Moving from lying on back to sitting on the side of a flat bed without bedrails?  3  -KG     Moving to and from a bed to a chair (including a wheelchair)?  3  -KG     Standing up from a chair using your arms (e.g., wheelchair, bedside chair)?  3  -KG     Climbing 3-5 steps with a railing?  3  -KG     To walk in hospital room?  3  -KG     AM-PAC 6 Clicks Score (PT)  18  -KG     Row Name 03/14/21 1146          Functional Assessment    Outcome Measure Options  AM-PAC 6 Clicks Basic Mobility (PT)  -KG       User Key  (r) = Recorded By, (t) = Taken By, (c) = Cosigned By    Initials Name Provider Type    Mahsa Aragon PT Physical Therapist        Physical Therapy Education                 Title: PT OT SLP Therapies (In Progress)     Topic: Physical Therapy (In Progress)     Point: Mobility training (In Progress)     Learning Progress Summary           Patient Acceptance, E, NR by KG at 3/14/2021 0850    Acceptance, E, NR by KG at 3/13/2021 1046    Acceptance, E, NR by RICO at 3/12/2021 1300                   Point: Home exercise program (In Progress)     Learning Progress Summary           Patient Acceptance, E, NR by KG at 3/14/2021 0850    Acceptance, E, NR by KG at 3/13/2021 1046    Acceptance, E, NR by RICO at 3/12/2021 1300                   Point: Body mechanics (In Progress)     Learning Progress Summary           Patient Acceptance, E, NR by KG at 3/14/2021 0850    Acceptance, E, NR by KG at 3/13/2021 1046    Acceptance, E, NR by  RICO at 3/12/2021 1300                   Point: Precautions (In Progress)     Learning Progress Summary           Patient Acceptance, E, NR by KG at 3/14/2021 0850    Acceptance, E, NR by KG at 3/13/2021 1046    Acceptance, E, NR by RICO at 3/12/2021 1300                               User Key     Initials Effective Dates Name Provider Type Discipline    RICO 06/19/15 -  Sarahi Gill, PT Physical Therapist PT    KG 05/22/20 -  Mahsa Hawthorne, PT Physical Therapist PT              PT Recommendation and Plan     Plan of Care Reviewed With: patient  Progress: improving  Outcome Summary: Pt increased ambulation distance to 330ft with CGA 1+1. Pt demonstrated good balance and stability. Improved gustavo with continued forward ambulation. Pt's mobility limited by fatigue. Continue to progress as appropriate.     Time Calculation:   PT Charges     Row Name 03/14/21 0850             Time Calculation    Start Time  0850  -KG      PT Received On  03/14/21  -KG      PT Goal Re-Cert Due Date  03/22/21  -KG         Time Calculation- PT    Total Timed Code Minutes- PT  26 minute(s)  -KG         Timed Charges    32959 - PT Therapeutic Activity Minutes  26  -KG        User Key  (r) = Recorded By, (t) = Taken By, (c) = Cosigned By    Initials Name Provider Type    KG Mahsa Hawthorne, PT Physical Therapist        Therapy Charges for Today     Code Description Service Date Service Provider Modifiers Qty    67379958891 HC PT THERAPEUTIC ACT EA 15 MIN 3/13/2021 Mahsa Hawthorne, PT GP 2    03534904574 HC PT THERAPEUTIC ACT EA 15 MIN 3/14/2021 Mahsa Hawthorne, PT GP 2    08234747136 HC PT THER SUPP EA 15 MIN 3/14/2021 Mahsa Hawthorne, PT GP 2          PT G-Codes  Outcome Measure Options: AM-PAC 6 Clicks Basic Mobility (PT)  AM-PAC 6 Clicks Score (PT): 18    Magali Hawthorne PT  3/14/2021

## 2021-03-14 NOTE — PLAN OF CARE
Goal Outcome Evaluation:  Plan of Care Reviewed With: patient  Progress: improving  Outcome Summary: Pt increased ambulation distance to 330ft with CGA 1+1. Pt demonstrated good balance and stability. Improved gustavo with continued forward ambulation. Pt's mobility limited by fatigue. Continue to progress as appropriate.

## 2021-03-14 NOTE — PROGRESS NOTES
Cardiothoracic Surgery Progress Note      POD # 3 s/p CABG x 3     LOS: 3 days      Subjective:  Extubated and comfortable in the chair    Objective:  Vital Signs  Temp:  [97.7 °F (36.5 °C)-98.7 °F (37.1 °C)] 97.7 °F (36.5 °C)  Heart Rate:  [] 79  Resp:  [18-20] 18  BP: ()/(49-97) 122/77    Physical Exam:   General Appearance: alert, appears stated age and cooperative   Lungs: clear to auscultation, respirations regular, respirations even and respirations unlabored   Heart: regular rhythm & normal rate, normal S1, S2 and no murmur, no gallop, no rub   Skin: Incision c/d/i     Results:    Results from last 7 days   Lab Units 03/14/21  0408   WBC 10*3/mm3 18.51*   HEMOGLOBIN g/dL 10.9*   HEMATOCRIT % 34.4*   PLATELETS 10*3/mm3 205     Results from last 7 days   Lab Units 03/14/21  0408   SODIUM mmol/L 136   POTASSIUM mmol/L 4.3   CHLORIDE mmol/L 99   CO2 mmol/L 29.0   BUN mg/dL 26*   CREATININE mg/dL 1.08   GLUCOSE mg/dL 167*   CALCIUM mg/dL 8.6       Assessment:  POD # 3 s/p CABG x 3    Plan:  Creatinine is 1.08 down from 1.14 he is off Levophed  He is sinus after having a run of A. fib last night continue amiodarone drip  He is on a little bit of Willy-Synephrine will wean today.  When he is off pressors he can transfer from the unit  Hieu Meza MD  03/14/21  09:57 EDT

## 2021-03-14 NOTE — PLAN OF CARE
Goal Outcome Evaluation:  Plan of Care Reviewed With: patient  Remains on transfer to floor. Alert and oriented. Lungs clear but decreased. Nasal O2 2 liters in use. Remains in NSR throughout the day. Voiding well in urinal. Only a small Hard BM today. Tried few times with no success. Incisions intact without drainage. Continue to monitor.

## 2021-03-14 NOTE — PROGRESS NOTES
Critical Care Note     LOS: 3 days   Patient Care Team:  Johan Tse MD as PCP - General (Internal Medicine)  Sree Johnson MD as Cardiologist (Cardiology)    Chief Complaint/Reason for visit:    CABG x3  Ischemic cardiomyopathy  Hypertension  Diabetes mellitus type 2      Subjective     HPI:  Sony Marks is a 60 y.o. male, former smoker, with past medical history significant for NSTEMI (2011), ischemic cardiomyopathy, hypertension, hyperlipidemia, and diabetes mellitus type 2.  Patient was seen on 3/5/2021 in the cardiology clinic for follow-up of chest pain.  Originally he was seen on 2/21/2021 for chest pain consistent with angina however his condition continued to worsen and by the next visit he had been frequently using sublingual nitroglycerin to ameliorate symptoms.  He additionally complained of shortness of air at rest.  On 3/8/2021 he underwent TTE showing hypokinesis of the mid anterior wall, LVEF 40%.  The same day he underwent cardiac catheterization showing severe multivessel coronary artery disease with known ischemic cardiomyopathy.  He had a patent stent in the proximal LAD but severe stenosis in the mid LAD at the stent outflow, severe stenosis of the proximal OM1 with a patent stent in the mid OM1 and severe stenosis of the proximal RCA with an elevated LVEDP of 25.  He was referred to cardiothoracic surgery who recommended coronary artery bypass graft.  Preoperative pulmonary function test revealed an FVC of 3.48 L, 70%, FEV1 2.74 L, 73% ratio 78%, no obstruction.  He has a history of smoking cigars for 7 years but has not smoked in many years.  March 11, 2021 he underwent CABG x3.  He initially required an epinephrine drip coming out of surgery. He extubated night of surgery. He had some mild renal insufficiency with creatinine of 1.55 but good urine output. He required some low-dose epinephrine in the first 24 hours post surgery.  Blood pressure improved and serum  creatinine normalized.    Interval History:     He developed atrial fibrillation last night and was placed on amiodarone drip.  He converted to sinus rhythm.  With his arrhythmia he required initiation of phenylephrine for blood pressure support.  MTs removed today.  He is afebrile.  2 L saturation 96%.  Voiding without a Felix catheter.      Review of Systems:    All systems were reviewed and negative except as noted in subjective.    Medical history, surgical history, social history, family history reviewed    Objective     Intake/Output:    Intake/Output Summary (Last 24 hours) at 3/14/2021 1100  Last data filed at 3/14/2021 0800  Gross per 24 hour   Intake 979 ml   Output 1570 ml   Net -591 ml       Nutrition:  Diet Regular; Consistent Carbohydrate    Infusions:  amiodarone, 0.5 mg/min, Last Rate: 0.5 mg/min (03/14/21 0350)  dexmedetomidine, 0.2-1.5 mcg/kg/hr, Last Rate: 0.2 mcg/kg/hr (03/13/21 0430)  lactated ringers, 9 mL/hr, Last Rate: 9 mL/hr (03/11/21 0615)  norepinephrine, 0.02-0.3 mcg/kg/min  phenylephrine, 0.5-3 mcg/kg/min, Last Rate: 0.2 mcg/kg/min (03/14/21 0158)  sodium chloride, 30 mL/hr          Telemetry: Sinus rhythm, atrial fibrillation overnight             Vital Signs  Blood pressure 122/77, pulse 79, temperature 97.7 °F (36.5 °C), temperature source Oral, resp. rate 18, SpO2 96 %.    Physical Exam:  General Appearance:   Well-developed middle-aged gentleman, sitting in the chair, anxious   Head:   Normocephalic, atraumatic   Eyes:          Conjunctiva pink.  Pupils small and equal, no jaundice   Ears:     Throat:  Oral mucosa moist   Neck:  Trachea midline, no crepitus.  Right IJ introducer   Back:      Lungs:    Sternotomy incision intact.    Symmetric chest expansion with bilateral breath sounds, with rare expiratory rhonchi    Heart:   Regular rhythm, S1, S2 auscultated, no appreciable murmur, soft pericardial rub   Abdomen:    Abdomen is quiet, nondistended, soft    Rectal:   Deferred    Extremities:  Right leg incisions intact with minimal ecchymosis.  No pretibial edema.     Pulses:  Pedal pulses present   Skin:  No rash or cyanosis   Lymph nodes:    Neurologic:  Awake, oriented, no focal weakness, conversant      Results Review:     I reviewed the patient's new clinical results.   Results from last 7 days   Lab Units 03/14/21  0408 03/13/21  0355 03/12/21  0319 03/09/21  1619   SODIUM mmol/L 136 133* 141 141   POTASSIUM mmol/L 4.3 4.3 4.2 4.4   CHLORIDE mmol/L 99 98 104 101   CO2 mmol/L 29.0 27.0 25.0 28.0   BUN mg/dL 26* 26* 22* 13   CREATININE mg/dL 1.08 1.14 1.55* 0.88   CALCIUM mg/dL 8.6 8.9 9.0 9.9   BILIRUBIN mg/dL  --   --   --  0.6   ALK PHOS U/L  --   --   --  78   ALT (SGPT) U/L  --   --   --  17   AST (SGOT) U/L  --   --   --  18   GLUCOSE mg/dL 167* 144* 139* 117*     Results from last 7 days   Lab Units 03/14/21  0408 03/13/21  0355 03/12/21  0319   WBC 10*3/mm3 18.51* 18.49* 15.40*   HEMOGLOBIN g/dL 10.9* 10.7* 11.5*   HEMATOCRIT % 34.4* 34.1* 36.4*   PLATELETS 10*3/mm3 205 174 221     Results from last 7 days   Lab Units 03/11/21  1753   PH, ARTERIAL pH units 7.348*   PO2 ART mm Hg 79.0*   PCO2, ARTERIAL mm Hg 47.4*   HCO3 ART mmol/L 26.1*     No results found for: BLOODCX  No results found for: URINECX    I reviewed the patient's new imaging including images and reports.    Chest x-ray reveals right IJ introducer.  Small lung volumes.  Minimal atelectasis at the left base.      All medications reviewed.   amiodarone, 200 mg, Oral, Once   Followed by  [START ON 3/15/2021] amiodarone, 200 mg, Oral, Q8H   Followed by  [START ON 3/21/2021] amiodarone, 200 mg, Oral, Q12H   Followed by  [START ON 4/5/2021] amiodarone, 200 mg, Oral, Daily  aspirin, 325 mg, Oral, Daily  atorvastatin, 80 mg, Oral, Nightly  famotidine, 20 mg, Oral, BID AC  insulin lispro, 0-14 Units, Subcutaneous, TID AC  metoprolol tartrate, 12.5 mg, Oral, Q12H  pharmacy consult - MTM, , Does not apply,  Daily  senna-docusate sodium, 2 tablet, Oral, BID          Assessment/Plan       Coronary artery disease involving native coronary artery of native heart with unstable angina pectoris (CMS/[facility])    Essential hypertension    Hyperlipidemia LDL goal <70    Type 2 diabetes mellitus, without long-term current use of insulin (CMS/[facility])    Ischemic cardiomyopathy    Atrial fibrillation with RVR (CMS/[facility])    60-year-old gentleman with a known history of coronary disease, previous stents presenting with angina and found to have severe multivessel disease.  He underwent CABG x3 on March 11.  Coming off pump he required amiodarone and shock for some ventricular fibrillation.  He is currently maintaining a sinus rhythm.  Preoperative ejection fraction was 40%.  He was initially on an epinephrine drip but this has been weaned.   He did not receive blood products except for Cell Saver.  He has a remote history of smoking cigars and normal FEV1 preoperatively.  Mediastinal tubes removed today.  His hemoglobin is 10.9.    On the evening of March 13 he developed atrial fibrillation was placed on an amiodarone drip.  He also required phenylephrine for blood pressure support.    He had some renal insufficiency with a creatinine of 1.56 preoperatively.  Baseline was 1.09 on admission.  Today's creatinine is improved at 1.08.  Urine output remains adequate.  Potassium is 4.3.    He has a history of diabetes mellitus type 2, recent onset.  His preoperative A1c is 6.6.  Currently his blood glucose is 150-160 on sliding scale insulin. He is tolerating a p.o. diet no further nausea      PLAN:    Mobilize with therapy    Aspirin, statin, beta-blocker    Sliding scale insulin    Monitor electrolytes and replace as needed    Amiodarone per cardiology    Telemetry, once he no longer requires phenylephrine    VTE Prophylaxis: Foot pumps    Stress Ulcer Prophylaxis: Rj Guerra MD  03/14/21  11:00 EDT      Time: 20 minutes

## 2021-03-15 LAB
ANION GAP SERPL CALCULATED.3IONS-SCNC: 8 MMOL/L (ref 5–15)
BUN SERPL-MCNC: 17 MG/DL (ref 6–20)
BUN/CREAT SERPL: 19.5 (ref 7–25)
CALCIUM SPEC-SCNC: 8.9 MG/DL (ref 8.6–10.5)
CHLORIDE SERPL-SCNC: 98 MMOL/L (ref 98–107)
CO2 SERPL-SCNC: 31 MMOL/L (ref 22–29)
CREAT SERPL-MCNC: 0.87 MG/DL (ref 0.76–1.27)
DEPRECATED RDW RBC AUTO: 43.3 FL (ref 37–54)
ERYTHROCYTE [DISTWIDTH] IN BLOOD BY AUTOMATED COUNT: 12.4 % (ref 12.3–15.4)
GFR SERPL CREATININE-BSD FRML MDRD: 90 ML/MIN/1.73
GLUCOSE BLDC GLUCOMTR-MCNC: 119 MG/DL (ref 70–130)
GLUCOSE BLDC GLUCOMTR-MCNC: 129 MG/DL (ref 70–130)
GLUCOSE BLDC GLUCOMTR-MCNC: 151 MG/DL (ref 70–130)
GLUCOSE BLDC GLUCOMTR-MCNC: 158 MG/DL (ref 70–130)
GLUCOSE SERPL-MCNC: 169 MG/DL (ref 65–99)
HCT VFR BLD AUTO: 35.5 % (ref 37.5–51)
HGB BLD-MCNC: 11.2 G/DL (ref 13–17.7)
MCH RBC QN AUTO: 30.2 PG (ref 26.6–33)
MCHC RBC AUTO-ENTMCNC: 31.5 G/DL (ref 31.5–35.7)
MCV RBC AUTO: 95.7 FL (ref 79–97)
PLATELET # BLD AUTO: 268 10*3/MM3 (ref 140–450)
PMV BLD AUTO: 10.1 FL (ref 6–12)
POTASSIUM SERPL-SCNC: 3.4 MMOL/L (ref 3.5–5.2)
POTASSIUM SERPL-SCNC: 4.4 MMOL/L (ref 3.5–5.2)
QT INTERVAL: 278 MS
QT INTERVAL: 354 MS
QTC INTERVAL: 400 MS
QTC INTERVAL: 460 MS
RBC # BLD AUTO: 3.71 10*6/MM3 (ref 4.14–5.8)
SODIUM SERPL-SCNC: 137 MMOL/L (ref 136–145)
WBC # BLD AUTO: 15.53 10*3/MM3 (ref 3.4–10.8)

## 2021-03-15 PROCEDURE — 25010000002 ONDANSETRON PER 1 MG: Performed by: PHYSICIAN ASSISTANT

## 2021-03-15 PROCEDURE — 80048 BASIC METABOLIC PNL TOTAL CA: CPT | Performed by: PHYSICIAN ASSISTANT

## 2021-03-15 PROCEDURE — 82962 GLUCOSE BLOOD TEST: CPT

## 2021-03-15 PROCEDURE — 84132 ASSAY OF SERUM POTASSIUM: CPT | Performed by: THORACIC SURGERY (CARDIOTHORACIC VASCULAR SURGERY)

## 2021-03-15 PROCEDURE — 99232 SBSQ HOSP IP/OBS MODERATE 35: CPT | Performed by: INTERNAL MEDICINE

## 2021-03-15 PROCEDURE — 85027 COMPLETE CBC AUTOMATED: CPT | Performed by: THORACIC SURGERY (CARDIOTHORACIC VASCULAR SURGERY)

## 2021-03-15 PROCEDURE — 93005 ELECTROCARDIOGRAM TRACING: CPT | Performed by: NURSE PRACTITIONER

## 2021-03-15 PROCEDURE — 63710000001 INSULIN LISPRO (HUMAN) PER 5 UNITS

## 2021-03-15 PROCEDURE — 99232 SBSQ HOSP IP/OBS MODERATE 35: CPT | Performed by: PHYSICIAN ASSISTANT

## 2021-03-15 PROCEDURE — 97530 THERAPEUTIC ACTIVITIES: CPT

## 2021-03-15 PROCEDURE — 93010 ELECTROCARDIOGRAM REPORT: CPT | Performed by: INTERNAL MEDICINE

## 2021-03-15 RX ORDER — AMIODARONE HYDROCHLORIDE 200 MG/1
200 TABLET ORAL
Qty: 90 TABLET | Refills: 0 | Status: CANCELLED | OUTPATIENT
Start: 2021-03-15

## 2021-03-15 RX ORDER — AMIODARONE HYDROCHLORIDE 200 MG/1
200 TABLET ORAL
Status: DISCONTINUED | OUTPATIENT
Start: 2021-03-16 | End: 2021-03-16 | Stop reason: SDUPTHER

## 2021-03-15 RX ORDER — CARVEDILOL 6.25 MG/1
6.25 TABLET ORAL 2 TIMES DAILY WITH MEALS
Qty: 180 TABLET | Refills: 0 | Status: CANCELLED | OUTPATIENT
Start: 2021-03-15

## 2021-03-15 RX ORDER — CARVEDILOL 6.25 MG/1
6.25 TABLET ORAL 2 TIMES DAILY WITH MEALS
Status: DISCONTINUED | OUTPATIENT
Start: 2021-03-15 | End: 2021-03-16 | Stop reason: HOSPADM

## 2021-03-15 RX ADMIN — HYDROCODONE BITARTRATE AND ACETAMINOPHEN 1 TABLET: 7.5; 325 TABLET ORAL at 17:34

## 2021-03-15 RX ADMIN — AMIODARONE HYDROCHLORIDE 200 MG: 200 TABLET ORAL at 12:07

## 2021-03-15 RX ADMIN — POTASSIUM CHLORIDE 40 MEQ: 750 CAPSULE, EXTENDED RELEASE ORAL at 10:08

## 2021-03-15 RX ADMIN — SODIUM CHLORIDE, PRESERVATIVE FREE 30 ML: 5 INJECTION INTRAVENOUS at 20:42

## 2021-03-15 RX ADMIN — INSULIN LISPRO 3 UNITS: 100 INJECTION, SOLUTION INTRAVENOUS; SUBCUTANEOUS at 12:08

## 2021-03-15 RX ADMIN — ATORVASTATIN CALCIUM 80 MG: 40 TABLET, FILM COATED ORAL at 20:41

## 2021-03-15 RX ADMIN — ASPIRIN 325 MG: 325 TABLET, COATED ORAL at 08:47

## 2021-03-15 RX ADMIN — OXYCODONE 10 MG: 5 TABLET ORAL at 01:28

## 2021-03-15 RX ADMIN — CARVEDILOL 6.25 MG: 6.25 TABLET, FILM COATED ORAL at 17:34

## 2021-03-15 RX ADMIN — AMIODARONE HYDROCHLORIDE 200 MG: 200 TABLET ORAL at 05:43

## 2021-03-15 RX ADMIN — FAMOTIDINE 20 MG: 20 TABLET, FILM COATED ORAL at 17:34

## 2021-03-15 RX ADMIN — ONDANSETRON 4 MG: 2 INJECTION INTRAMUSCULAR; INTRAVENOUS at 05:39

## 2021-03-15 RX ADMIN — OXYCODONE 10 MG: 5 TABLET ORAL at 12:08

## 2021-03-15 RX ADMIN — HYDROCODONE BITARTRATE AND ACETAMINOPHEN 1 TABLET: 7.5; 325 TABLET ORAL at 08:52

## 2021-03-15 RX ADMIN — DOCUSATE SODIUM 50MG AND SENNOSIDES 8.6MG 2 TABLET: 8.6; 5 TABLET, FILM COATED ORAL at 08:47

## 2021-03-15 RX ADMIN — POTASSIUM CHLORIDE 40 MEQ: 750 CAPSULE, EXTENDED RELEASE ORAL at 06:23

## 2021-03-15 RX ADMIN — DOCUSATE SODIUM 50MG AND SENNOSIDES 8.6MG 2 TABLET: 8.6; 5 TABLET, FILM COATED ORAL at 20:41

## 2021-03-15 RX ADMIN — FAMOTIDINE 20 MG: 20 TABLET, FILM COATED ORAL at 10:08

## 2021-03-15 RX ADMIN — METOPROLOL TARTRATE 25 MG: 25 TABLET, FILM COATED ORAL at 08:47

## 2021-03-15 NOTE — THERAPY TREATMENT NOTE
Patient Name: Sony Marks  : 1960    MRN: [id number]                              Today's Date: 3/15/2021       Admit Date: 3/11/2021    Visit Dx:     ICD-10-CM ICD-9-CM   1. CAD in native artery  I25.10 414.01     Patient Active Problem List   Diagnosis   • Coronary artery disease involving native coronary artery of native heart with unstable angina pectoris (CMS/[facility])   • Essential hypertension   • Hyperlipidemia LDL goal <70   • Type 2 diabetes mellitus, without long-term current use of insulin (CMS/[facility])   • Ischemic cardiomyopathy   • Atrial fibrillation with RVR (CMS/[facility])     Past Medical History:   Diagnosis Date   • Coronary artery disease    • Heart attack (CMS/[facility]) 2011   • Hyperlipidemia    • Hypertension    • Type 2 diabetes mellitus (CMS/[facility])      Past Surgical History:   Procedure Laterality Date   • CARDIAC CATHETERIZATION     • CARDIAC CATHETERIZATION N/A 3/8/2021    Procedure: Coronary angiography;  Surgeon: Sree Johnson MD;  Location:  [facility] CATH INVASIVE LOCATION;  Service: Cardiology;  Laterality: N/A;   • CARDIAC CATHETERIZATION N/A 3/8/2021    Procedure: Left Heart Cath;  Surgeon: Sree Johnson MD;  Location: [facility] CATH INVASIVE LOCATION;  Service: Cardiology;  Laterality: N/A;   • COLONOSCOPY     • CORONARY ANGIOPLASTY WITH STENT PLACEMENT  2011   • CORONARY ARTERY BYPASS GRAFT N/A 3/11/2021    Procedure: MEDIAN STERNOTOMY, CORONARY ARTERY BYPASS GRAFTING x3, UTILIZATION OF LEFT INTERAL MAMMARY, EVH OF RIGHT GREATER SAPHENOUS VEIN, GERMAN PER ANESTHESIA;  Surgeon: Panfilo Hamm MD;  Location: [facility];  Service: Cardiothoracic;  Laterality: N/A;  VO: 0845  VR: 0920       General Information     Row Name 03/15/21 1131          Physical Therapy Time and Intention    Document Type  therapy note (daily note)  -KG     Mode of Treatment  physical therapy  -KG     Row Name 03/15/21 1131          General Information    Existing Precautions/Restrictions   cardiac;fall;oxygen therapy device and L/min;sternal  -KG     Row Name 03/15/21 1131          Cognition    Orientation Status (Cognition)  oriented x 4  -KG     Row Name 03/15/21 1131          Safety Issues, Functional Mobility    Safety Issues Affecting Function (Mobility)  safety precaution awareness;safety precautions follow-through/compliance  -KG     Impairments Affecting Function (Mobility)  balance;endurance/activity tolerance;shortness of breath;strength  -KG       User Key  (r) = Recorded By, (t) = Taken By, (c) = Cosigned By    Initials Name Provider Type    KG Mahsa Hawthorne, PT Physical Therapist        Mobility     Row Name 03/15/21 1131          Bed Mobility    Comment (Bed Mobility)  UIC  -KG     Row Name 03/15/21 1131          Transfers    Comment (Transfers)  VC's for sequencing and hand placement to maintain sternal precautions.  -KG     Row Name 03/15/21 1131          Sit-Stand Transfer    Sit-Stand [place] (Transfers)  contact guard;verbal cues  -KG     Row Name 03/15/21 1131          Gait/Stairs (Locomotion)    [place] Level (Gait)  contact guard;verbal cues  -KG     Assistive Device (Gait)  other (see comments) B UE support on tele monitor  -KG     Distance in Feet (Gait)  440  -KG     Deviations/Abnormal Patterns (Gait)  gustavo decreased;stride length decreased  -KG     Bilateral Gait Deviations  forward flexed posture  -KG     Comment (Gait/Stairs)  Pt demonstrated step through gait pattern with slow gustavo and decreased step length. Improved gait speed with continued forward ambulation. Pt with improved balance and coordination. Mobility limited by fatigue.  -KG       User Key  (r) = Recorded By, (t) = Taken By, (c) = Cosigned By    Initials Name Provider Type    KG Mahsa Hawthorne, PT Physical Therapist        Obj/Interventions     Row Name 03/15/21 1133          Balance    Balance Assessment  sitting static balance;standing static balance;standing dynamic balance   -KG     Static Sitting Balance  WFL;sitting in chair  -KG     Static Standing Balance  WFL;unsupported;standing  -KG     Dynamic Standing Balance  WFL;supported;standing  -KG       User Key  (r) = Recorded By, (t) = Taken By, (c) = Cosigned By    Initials Name Provider Type    KG Mahsa Hawthorne N, PT Physical Therapist        Goals/Plan    No documentation.       Clinical Impression     Row Name 03/15/21 1133          Pain    Additional Documentation  Pain Scale: Numbers Pre/Post-Treatment (Group)  -KG     Row Name 03/15/21 1133          Pain Scale: Numbers Pre/Post-Treatment    Pretreatment Pain Rating  2/10  -KG     Posttreatment Pain Rating  2/10  -KG     Pain Location - Orientation  incisional  -KG     Pain Location  chest  -KG     Pain Intervention(s)  Repositioned;Ambulation/increased activity  -KG     Row Name 03/15/21 1133          Plan of Care Review    Plan of Care Reviewed With  patient  -KG     Progress  improving  -KG     Outcome Summary  Pt increased ambulation distance to 440ft with CGA and B UE support on tele monitor. Pt demonstrated appropriate gait speed with good balance and stability. Pt's mobility limited by fatigue. Continue to progress as appropriate.  -KG     Row Name 03/15/21 1133          Vital Signs    Pre Systolic BP Rehab  117  -KG     Pre Treatment Diastolic BP  66  -KG     Post Systolic BP Rehab  115  -KG     Post Treatment Diastolic BP  77  -KG     Pretreatment Heart Rate (beats/min)  75  -KG     Posttreatment Heart Rate (beats/min)  77  -KG     Pre SpO2 (%)  97  -KG     O2 Delivery Pre Treatment  supplemental O2  -KG     Post SpO2 (%)  97  -KG     O2 Delivery Post Treatment  supplemental O2  -KG     Pre Patient Position  Sitting  -KG     Intra Patient Position  Standing  -KG     Post Patient Position  Sitting  -KG     Row Name 03/15/21 1138          Positioning and Restraints    Pre-Treatment Position  sitting in chair/recliner  -KG     Post Treatment Position  chair  -KG      In Chair  notified nsg;reclined;call light within reach;encouraged to call for assist;RUE elevated;LUE elevated;legs elevated  -KG       User Key  (r) = Recorded By, (t) = Taken By, (c) = Cosigned By    Initials Name Provider Type    Mahsa Aragon PT Physical Therapist        Outcome Measures     Row Name 03/15/21 1134          How much help from another person do you currently need...    Turning from your back to your side while in flat bed without using bedrails?  3  -KG     Moving from lying on back to sitting on the side of a flat bed without bedrails?  3  -KG     Moving to and from a bed to a chair (including a wheelchair)?  3  -KG     Standing up from a chair using your arms (e.g., wheelchair, bedside chair)?  3  -KG     Climbing 3-5 steps with a railing?  3  -KG     To walk in hospital room?  3  -KG     AM-PAC 6 Clicks Score (PT)  18  -KG     Row Name 03/15/21 1134          Functional Assessment    Outcome Measure Options  AM-PAC 6 Clicks Basic Mobility (PT)  -KG       User Key  (r) = Recorded By, (t) = Taken By, (c) = Cosigned By    Initials Name Provider Type    Mahsa Aragon PT Physical Therapist        Physical Therapy Education                 Title: PT OT SLP Therapies (In Progress)     Topic: Physical Therapy (In Progress)     Point: Mobility training (In Progress)     Learning Progress Summary           Patient Acceptance, E, NR by KG at 3/15/2021 0939    Acceptance, E, NR by KG at 3/14/2021 0850    Acceptance, E, NR by KG at 3/13/2021 1046    Acceptance, E, NR by RICO at 3/12/2021 1300                   Point: Home exercise program (In Progress)     Learning Progress Summary           Patient Acceptance, E, NR by KG at 3/15/2021 0939    Acceptance, E, NR by KG at 3/14/2021 0850    Acceptance, E, NR by KG at 3/13/2021 1046    Acceptance, E, NR by RICO at 3/12/2021 1300                   Point: Body mechanics (In Progress)     Learning Progress Summary           Patient Acceptance,  E, NR by KG at 3/15/2021 0939    Acceptance, E, NR by KG at 3/14/2021 0850    Acceptance, E, NR by KG at 3/13/2021 1046    Acceptance, E, NR by RICO at 3/12/2021 1300                   Point: Precautions (In Progress)     Learning Progress Summary           Patient Acceptance, E, NR by KG at 3/15/2021 0939    Acceptance, E, NR by KG at 3/14/2021 0850    Acceptance, E, NR by KG at 3/13/2021 1046    Acceptance, E, NR by RICO at 3/12/2021 1300                               User Key     Initials Effective Dates Name Provider Type Discipline    RICO 06/19/15 -  Sarahi Gill, PT Physical Therapist PT    KG 05/22/20 -  Mahsa Hawthorne PT Physical Therapist PT              PT Recommendation and Plan     Plan of Care Reviewed With: patient  Progress: improving  Outcome Summary: Pt increased ambulation distance to 440ft with CGA and B UE support on tele monitor. Pt demonstrated appropriate gait speed with good balance and stability. Pt's mobility limited by fatigue. Continue to progress as appropriate.     Time Calculation:   PT Charges     Row Name 03/15/21 0939             Time Calculation    Start Time  0939  -KG      PT Received On  03/15/21  -KG      PT Goal Re-Cert Due Date  03/22/21  -KG         Time Calculation- PT    Total Timed Code Minutes- PT  25 minute(s)  -KG         Timed Charges    31032 - PT Therapeutic Activity Minutes  25  -KG        User Key  (r) = Recorded By, (t) = Taken By, (c) = Cosigned By    Initials Name Provider Type    KG Mahsa Hawthonre, PT Physical Therapist        Therapy Charges for Today     Code Description Service Date Service Provider Modifiers Qty    80336406185 HC PT THERAPEUTIC ACT EA 15 MIN 3/14/2021 Mahsa Hawthorne, PT GP 2    86267002384 HC PT THER SUPP EA 15 MIN 3/14/2021 Mahsa Hawthorne, PT GP 2    62803865280 HC PT THERAPEUTIC ACT EA 15 MIN 3/15/2021 Mahsa Hawthorne, PT GP 2          PT G-Codes  Outcome Measure Options: AM-PAC 6 Clicks Basic Mobility  (PT)  AM-[facility] 6 Clicks Score (PT): 18    Magali Hawthorne, PT  3/15/2021

## 2021-03-15 NOTE — PROGRESS NOTES
INTENSIVIST / PULMONARY FOLLOW UP NOTE     Hospital:  LOS: 4 days   Mr. Sony Marks, 60 y.o. male is followed for:     Coronary artery disease involving native coronary artery of native heart with unstable angina pectoris (CMS/HCC)    Essential hypertension    Hyperlipidemia LDL goal <70    Type 2 diabetes mellitus, without long-term current use of insulin (CMS/HCC)    Ischemic cardiomyopathy    Atrial fibrillation with RVR (CMS/HCC)          SUBJECTIVE   No complaints this morning    The patient's relevant past medical, surgical, family, and social history were reviewed    Allergies and medications were reviewed    ROS:  Per subjective, all other systems were reviewed and were negative        OBJECTIVE     Vital Sign Min/Max for last 24 hours:  Temp  Min: 98 °F (36.7 °C)  Max: 98.8 °F (37.1 °C)   BP  Min: 96/74  Max: 158/91   Pulse  Min: 74  Max: 100   Resp  Min: 18  Max: 20   SpO2  Min: 89 %  Max: 100 %   No data recorded     Physical Exam:  General Appearance:  Conversant, in no acute distress  Eyes:  No scleral icterus or pallor, pupils normal  Ears, Nose, Mouth, Throat:  Atraumatic, oropharynx clear  Neck:  Trachea midline, thyroid normal  Respiratory:  Clear to auscultation bilaterally, normal effort, no tenderness to palpation  Cardiovascular:  Regular rate and rhythm, no murmurs, no peripheral edema, no thrill  Gastrointestinal:  Soft, nontender, nondistended, no hepatosplenomegaly  Skin:  Normal temperature, no rash  Psychiatric:  Alert and oriented x 3, normal judgement and insight  Neuro:  No new focal neurologic deficits observed    Telemetry:                  SpO2: 93 % SpO2  Min: 89 %  Max: 100 %   Device:      Flow Rate:   No data recorded        Intake/Ouptut 24 hrs (7:00AM - 6:59 AM)  Intake & Output (last 3 days)       03/12 0701 - 03/13 0700 03/13 0701 - 03/14 0700 03/14 0701 - 03/15 0700 03/15 0701 - 03/16 0700    P.O.  240    I.V. (mL/kg) 1356 219      NG/GT        IV  Piggyback 200       Total Intake(mL/kg) 2286 879 1240 (11.4) 240 (2.2)    Urine (mL/kg/hr) 425 1695 1075 (0.4) 375 (0.8)    Emesis/NG output        Stool   0     Blood        Chest Tube 380 40      Total Output 805 1735 1075 375    Net +1481 -856 +165 -135            Urine Unmeasured Occurrence   2 x     Stool Unmeasured Occurrence   1 x           Lines, Drains & Airways    Active LDAs     Name:   Placement date:   Placement time:   Site:   Days:    Peripheral IV 03/15/21 0532 Distal;Posterior;Right Forearm   03/15/21    0532    Forearm   less than 1                Hematology:  Results from last 7 days   Lab Units 03/15/21  0702 03/14/21  0408 03/13/21  0355 03/12/21 0319 03/12/21  0016 03/11/21 2008 03/11/21  1618   WBC 10*3/mm3 15.53* 18.51* 18.49* 15.40* 17.24* 17.02* 24.06*   HEMOGLOBIN g/dL 11.2* 10.9* 10.7* 11.5* 12.1* 12.1* 12.7*   HEMATOCRIT % 35.5* 34.4* 34.1* 36.4* 38.0 38.5 39.3   PLATELETS 10*3/mm3 268 205 174 221 240 216 249     Electrolytes, Magnesium and Phosphorus:  Results from last 7 days   Lab Units 03/15/21  0339 03/14/21  0408 03/13/21  0355 03/12/21 0319 03/12/21  0016 03/11/21 2008 03/11/21 1618 03/11/21  1311 03/09/21  1619   SODIUM mmol/L 137 136 133* 141 141 141 140 143 141   CHLORIDE mmol/L 98 99 98 104 104 103 103 106 101   POTASSIUM mmol/L 3.4* 4.3 4.3 4.2 4.2 4.0 4.3 4.2  4.2 4.4   CO2 mmol/L 31.0* 29.0 27.0 25.0 26.0 25.0 23.0 25.0 28.0   MAGNESIUM mg/dL  --   --   --  3.0* 2.7* 1.9 1.9 2.1 2.0   PHOSPHORUS mg/dL  --   --   --  4.9* 4.3 3.3 5.1* 5.3*  --      Renal:  Results from last 7 days   Lab Units 03/15/21  0339 03/14/21  0408 03/13/21  0355 03/12/21  0319 03/12/21  0016 03/11/21 2008 03/11/21  1618   CREATININE mg/dL 0.87 1.08 1.14 1.55* 1.56* 1.57* 1.42*   BUN mg/dL 17 26* 26* 22* 23* 23* 22*     Estimated Creatinine Clearance: 114.8 mL/min (by C-G formula based on SCr of 0.87 mg/dL).  Hepatic:  Results from last 7 days   Lab Units 03/09/21  1619   ALK PHOS U/L 78    BILIRUBIN mg/dL 0.6   ALT (SGPT) U/L 17   AST (SGOT) U/L 18     Arterial Blood Gases:  Results from last 7 days   Lab Units 03/11/21  1753 03/11/21  1714 03/11/21  1335 03/11/21  1215 03/11/21  1145 03/11/21  1120 03/11/21  1108   PH, ARTERIAL pH units 7.348* 7.225* 7.401 7.37 7.37 7.50 7.43   PCO2, ARTERIAL mm Hg 47.4* 55.6* 41.7  --   --   --   --    PO2 ART mm Hg 79.0* 87.5 251.0*  --   --   --   --    FIO2 % 40 40 100  --   --   --   --              No results found for: LACTATE    Relevant imaging studies and labs from 03/15/21 were reviewed and interpreted by me    Medications (drips):       amiodarone, 200 mg, Oral, Q8H   Followed by  [START ON 3/21/2021] amiodarone, 200 mg, Oral, Q12H   Followed by  [START ON 4/5/2021] amiodarone, 200 mg, Oral, Daily  aspirin, 325 mg, Oral, Daily  atorvastatin, 80 mg, Oral, Nightly  famotidine, 20 mg, Oral, BID AC  insulin lispro, 0-14 Units, Subcutaneous, TID AC  metoprolol tartrate, 25 mg, Oral, Q12H  pharmacy consult - [facility], , Does not apply, Daily  senna-docusate sodium, 2 tablet, Oral, BID        Assessment/Plan   IMPRESSION / PLAN     Inpatient Problem List:  60 y.o.male:  Active Hospital Problems    Diagnosis    • **Coronary artery disease involving native coronary artery of native heart with unstable angina pectoris (CMS/HCC)      Cardiac catheterization for anterior STEMI (2011): Status post PCI of the proximal LAD.  Cardiac catheterization for unstable angina (3/8/2021): Severe 3-vessel CAD  CABG by Panfilo Hamm (3/11/2021): LIMA to LAD, SVG to distal RCA/OM     • Atrial fibrillation with RVR (CMS/[facility])      Occurred in the setting of postop CABG 3/13/2021  Converted with IV amiodarone  Chads Vasc=4      • Essential hypertension    • Hyperlipidemia LDL goal <70    • Type 2 diabetes mellitus, without long-term current use of insulin (CMS/[facility])    • Ischemic cardiomyopathy      Echo (3/8/2021): LVEF 40%.  No significant valve abnormality          Impression:  60  y.o.male with relevant PMH of prior tobacco abuse, NSTEMI (2011), ischemic cardiomyopathy EF 40%, prior stents, hypertension, hyperlipidemia, and diabetes mellitus type 2, recent cath 3/8/21 with MVCAD admitted 3/11/2021 post op 3vCABG.  Had some SUKH post op that resolved.  Also afib treated with amiodarone and intermittent pressor requirement.    Plan:  Post op care per surgery    CAD / CHF / Afib - per cardiology    Hypotension - now off pressors, monitor    SUKH - resolved    DM A1c - titrate SQ insulin    Mobilize    Nutrition - Diet Regular; Consistent Carbohydrate     To tele    Plan of care and goals reviewed with multidisciplinary team at daily rounds           Castillo Tabares MD  Intensive Care Medicine  03/15/21 11:35 EDT

## 2021-03-15 NOTE — PLAN OF CARE
Goal Outcome Evaluation:    Remains on transfer to floor. Alert and oriented. Lungs clear but decreased. Needs to pulm toilet/IS more aggressively, patient educated on importance and encouraged. NC @2L. Remained in NSR overnight. Voiding adequately. C/O constipation, tried several times only passing gas, BS (+).  Incisions intact without drainage. Ambulated about 500 feet. Pain controlled with prns. Will Continue to monitor.

## 2021-03-15 NOTE — NURSING NOTE
Pt. Referred for Phase II Cardiac Rehab. Staff discussed benefits of exercise, program protocol, and educational material provided. Teach back verified.  Permission granted from patient for staff to fax referral information to outlying program at this time.  Staff faxed referral info to [facility].

## 2021-03-15 NOTE — PLAN OF CARE
Goal Outcome Evaluation:  Plan of Care Reviewed With: patient  Progress: improving  Outcome Summary: Pt increased ambulation distance to 440ft with CGA and B UE support on tele monitor. Pt demonstrated appropriate gait speed with good balance and stability. Pt's mobility limited by fatigue. Continue to progress as appropriate.

## 2021-03-15 NOTE — PROGRESS NOTES
Cardiothoracic Surgery Progress Note      POD # 4 s/p CABG x 3     LOS: 4 days      Subjective:  No complaints today.  Has some fatigue.      Objective:  Vital Signs  Temp:  [97.7 °F (36.5 °C)-98.8 °F (37.1 °C)] 98.5 °F (36.9 °C)  Heart Rate:  [] 82  Resp:  [18-20] 20  BP: ()/(54-95) 100/58    Physical Exam:   General Appearance: alert, appears stated age and cooperative   Lungs: clear to auscultation, respirations regular, respirations even and respirations unlabored   Heart: regular rhythm & normal rate, normal S1, S2 and no murmur, no gallop, no rub   Skin: Incision c/d/i     Results:  Results from last 7 days   Lab Units 03/15/21  0702   WBC 10*3/mm3 15.53*   HEMOGLOBIN g/dL 11.2*   HEMATOCRIT % 35.5*   PLATELETS 10*3/mm3 268     Results from last 7 days   Lab Units 03/15/21  0339   SODIUM mmol/L 137   POTASSIUM mmol/L 3.4*   CHLORIDE mmol/L 98   CO2 mmol/L 31.0*   BUN mg/dL 17   CREATININE mg/dL 0.87   GLUCOSE mg/dL 169*   CALCIUM mg/dL 8.9       Assessment:  POD # 4 s/p CABG x 3    Plan:  Transfer to telemetry  Ambulate  Pulmonary toilet  ASA, statin, BB  Amio to PO  Replete K  Home soon    Panfilo Hamm MD  03/15/21  07:45 EDT

## 2021-03-15 NOTE — PROGRESS NOTES
Continued Stay Note  [facility]     Patient Name: Sony Marks  MRN: [id number]  Today's Date: 3/15/2021    Admit Date: 3/11/2021    Discharge Plan     Row Name 03/15/21 1011       Plan    Plan  Home    Patient/Family in Agreement with Plan  yes    Plan Comments  Patient ambulating in ICU with therapy. On O2 at 2L/NC.  Has transer orders.  Plan is home.  Following    Final Discharge Disposition Code  01 - home or self-care        Discharge Codes    No documentation.       Expected Discharge Date and Time     Expected Discharge Date Expected Discharge Time    Mar 17, 2021             Claudette Araya RN

## 2021-03-15 NOTE — PROGRESS NOTES
" [facility] - Progress Note    Sony Marks  1960  S253/1    03/15/21, 09:36 EDT      Chief Complaint: Following for management of BP, arrhythmias    Subjective:   Up walking with PT this morning in ICU.  Orders for transfer to tele have been initiated.  Denies chest pain, denies dyspnea.  Feels \"hot\" this morning.  Denies congestion or the need to cough.      Review of Systems:  Pertinent positives are listed above and in physical exam.  All others have been reviewed and are negative.    amiodarone, 200 mg, Oral, Q8H   Followed by  [START ON 3/21/2021] amiodarone, 200 mg, Oral, Q12H   Followed by  [START ON 4/5/2021] amiodarone, 200 mg, Oral, Daily  aspirin, 325 mg, Oral, Daily  atorvastatin, 80 mg, Oral, Nightly  famotidine, 20 mg, Oral, BID AC  insulin lispro, 0-14 Units, Subcutaneous, TID AC  metoprolol tartrate, 25 mg, Oral, Q12H  pharmacy consult - [facility], , Does not apply, Daily  senna-docusate sodium, 2 tablet, Oral, BID        Objective:  Vitals:   height is 180.3 cm (70.98\") and weight is 109 kg (241 lb 2.9 oz). His oral temperature is 98.3 °F (36.8 °C). His blood pressure is 117/66 and his pulse is 87. His respiration is 18 and oxygen saturation is 97%.       Intake/Output Summary (Last 24 hours) at 3/15/2021 0936  Last data filed at 3/15/2021 0900  Gross per 24 hour   Intake 1180 ml   Output 1075 ml   Net 105 ml       Physical Exam:  General:  WN, NAD, A and O x3.  CV:  Normal S1,S2. No murmur, rub, or gallop.  Resp:  CTA [place], equal, non-labored.  Abd:  Soft, + BS, no organomegaly. Non-tender to palpation.  Extrem:  No edema BLE, 2+ pedal/PT pulses.            Results from last 7 days   Lab Units 03/15/21  0702   WBC 10*3/mm3 15.53*   HEMOGLOBIN g/dL 11.2*   HEMATOCRIT % 35.5*   PLATELETS 10*3/mm3 268     Results from last 7 days   Lab Units 03/15/21  0339 03/09/21  1619   SODIUM mmol/L 137 141   POTASSIUM mmol/L 3.4* 4.4   CHLORIDE mmol/L 98 101   CO2 mmol/L 31.0* " 28.0   BUN mg/dL 17 13   CREATININE mg/dL 0.87 0.88   CALCIUM mg/dL 8.9 9.9   BILIRUBIN mg/dL  --  0.6   ALK PHOS U/L  --  78   ALT (SGPT) U/L  --  17   AST (SGOT) U/L  --  18   GLUCOSE mg/dL 169* 117*     Results from last 7 days   Lab Units 03/12/21  0319 03/09/21  1619   INR  1.22* 0.97         Results from last 7 days   Lab Units 03/08/21  0844   CHOLESTEROL mg/dL 137   TRIGLYCERIDES mg/dL 88   HDL CHOL mg/dL 49   LDL CHOL mg/dL 71           Tele:  NSR    Assessment and Plan:  1.  Essential Hypertension   -  Controlled   -  Continue to monitor on Metoprolol Tartrate 25mg BID.    2.  Hyperlipidemia   -  Goal LDL <70.  Lipid panel reviewed;  LDL 71.   -  Continue high intensity statin, appropriate diet.    3. Coronary Artery Disease   -  EF 40% by pre op echo   -  Anterior STEMI 2011 with PCI to LAD   -  [facility] 3/8/21:  Severe 3 vessel disease prompting CABG   -  POD 4 p CABG: LIMA to LAD, SVG to distal RCA/OM   -  Continue ASA, Statin, BB.      4.  PO Atrial Fibrillation   -  Occurred on POD #2.   -  Converted with IV Amiodarone   -  Currently receiving tapering oral amio.  Continue to monitor, follow.     -  EKG performed/reviewed today.  NSR, normal DE/QT intervals.      I discussed the patients findings and my recommendations with the patient, any present family members, and the nursing staff.  Sourav Shah MD saw and examined patient, verified hx and PE, read all radiographic studies, reviewed labs and micro data, and formulated dx, plan for treatment and all medical decision making.      Alyssa Patton PA-C  03/15/21, 09:36 EDT  Electronically signed by LICO Pitts, 03/15/21, 9:49 AM EDT.

## 2021-03-15 NOTE — PROGRESS NOTES
Clinical Nutrition     Multidisciplinary Rounds      Patient Name: Sony Marks  Date of Encounter: 03/15/21 09:49 EDT  MRN: [id number]  Admission date: 3/11/2021    TOÑITO. MARISA to continue to follow per protocol.       Current diet: Diet Regular; Consistent Carbohydrate      Intervention:  Follow treatment plan  Care plan reviewed    Follow up:   Per protocol      Tatiana Mckinney RD  09:49 EDT  Time: 10min

## 2021-03-16 ENCOUNTER — APPOINTMENT (OUTPATIENT)
Dept: GENERAL RADIOLOGY | Facility: HOSPITAL | Age: 61
End: 2021-03-16

## 2021-03-16 VITALS
HEIGHT: 71 IN | TEMPERATURE: 97.9 F | RESPIRATION RATE: 16 BRPM | DIASTOLIC BLOOD PRESSURE: 62 MMHG | HEART RATE: 77 BPM | SYSTOLIC BLOOD PRESSURE: 115 MMHG | WEIGHT: 241 LBS | BODY MASS INDEX: 33.74 KG/M2 | OXYGEN SATURATION: 92 %

## 2021-03-16 LAB
ALBUMIN SERPL-MCNC: 3.8 G/DL (ref 3.5–5.2)
ALBUMIN/GLOB SERPL: 1.4 G/DL
ALP SERPL-CCNC: 78 U/L (ref 39–117)
ALT SERPL W P-5'-P-CCNC: 28 U/L (ref 1–41)
ANION GAP SERPL CALCULATED.3IONS-SCNC: 9 MMOL/L (ref 5–15)
AST SERPL-CCNC: 36 U/L (ref 1–40)
BILIRUB SERPL-MCNC: 0.6 MG/DL (ref 0–1.2)
BUN SERPL-MCNC: 19 MG/DL (ref 6–20)
BUN/CREAT SERPL: 18.6 (ref 7–25)
CALCIUM SPEC-SCNC: 8.9 MG/DL (ref 8.6–10.5)
CHLORIDE SERPL-SCNC: 101 MMOL/L (ref 98–107)
CO2 SERPL-SCNC: 30 MMOL/L (ref 22–29)
CREAT SERPL-MCNC: 1.02 MG/DL (ref 0.76–1.27)
DEPRECATED RDW RBC AUTO: 44.2 FL (ref 37–54)
ERYTHROCYTE [DISTWIDTH] IN BLOOD BY AUTOMATED COUNT: 12.6 % (ref 12.3–15.4)
GFR SERPL CREATININE-BSD FRML MDRD: 75 ML/MIN/1.73
GLOBULIN UR ELPH-MCNC: 2.8 GM/DL
GLUCOSE BLDC GLUCOMTR-MCNC: 115 MG/DL (ref 70–130)
GLUCOSE BLDC GLUCOMTR-MCNC: 133 MG/DL (ref 70–130)
GLUCOSE SERPL-MCNC: 140 MG/DL (ref 65–99)
HCT VFR BLD AUTO: 35 % (ref 37.5–51)
HGB BLD-MCNC: 10.9 G/DL (ref 13–17.7)
MCH RBC QN AUTO: 29.6 PG (ref 26.6–33)
MCHC RBC AUTO-ENTMCNC: 31.1 G/DL (ref 31.5–35.7)
MCV RBC AUTO: 95.1 FL (ref 79–97)
PLATELET # BLD AUTO: 318 10*3/MM3 (ref 140–450)
PMV BLD AUTO: 10.3 FL (ref 6–12)
POTASSIUM SERPL-SCNC: 3.9 MMOL/L (ref 3.5–5.2)
PROT SERPL-MCNC: 6.6 G/DL (ref 6–8.5)
QT INTERVAL: 322 MS
QT INTERVAL: 362 MS
QTC INTERVAL: 420 MS
QTC INTERVAL: 466 MS
RBC # BLD AUTO: 3.68 10*6/MM3 (ref 4.14–5.8)
SODIUM SERPL-SCNC: 140 MMOL/L (ref 136–145)
WBC # BLD AUTO: 13.33 10*3/MM3 (ref 3.4–10.8)

## 2021-03-16 PROCEDURE — 93010 ELECTROCARDIOGRAM REPORT: CPT | Performed by: INTERNAL MEDICINE

## 2021-03-16 PROCEDURE — 82962 GLUCOSE BLOOD TEST: CPT

## 2021-03-16 PROCEDURE — 71045 X-RAY EXAM CHEST 1 VIEW: CPT

## 2021-03-16 PROCEDURE — 97530 THERAPEUTIC ACTIVITIES: CPT

## 2021-03-16 PROCEDURE — 97110 THERAPEUTIC EXERCISES: CPT

## 2021-03-16 PROCEDURE — 25010000002 MORPHINE PER 10 MG: Performed by: INTERNAL MEDICINE

## 2021-03-16 PROCEDURE — 85027 COMPLETE CBC AUTOMATED: CPT | Performed by: THORACIC SURGERY (CARDIOTHORACIC VASCULAR SURGERY)

## 2021-03-16 PROCEDURE — 97116 GAIT TRAINING THERAPY: CPT

## 2021-03-16 PROCEDURE — 93005 ELECTROCARDIOGRAM TRACING: CPT | Performed by: THORACIC SURGERY (CARDIOTHORACIC VASCULAR SURGERY)

## 2021-03-16 PROCEDURE — 93005 ELECTROCARDIOGRAM TRACING: CPT | Performed by: NURSE PRACTITIONER

## 2021-03-16 PROCEDURE — 99232 SBSQ HOSP IP/OBS MODERATE 35: CPT | Performed by: INTERNAL MEDICINE

## 2021-03-16 PROCEDURE — 80053 COMPREHEN METABOLIC PANEL: CPT | Performed by: THORACIC SURGERY (CARDIOTHORACIC VASCULAR SURGERY)

## 2021-03-16 RX ORDER — CARVEDILOL 6.25 MG/1
6.25 TABLET ORAL 2 TIMES DAILY WITH MEALS
Qty: 180 TABLET | Refills: 0 | Status: SHIPPED | OUTPATIENT
Start: 2021-03-16 | End: 2021-03-17 | Stop reason: SDUPTHER

## 2021-03-16 RX ORDER — LOSARTAN POTASSIUM 25 MG/1
12.5 TABLET ORAL
Status: DISCONTINUED | OUTPATIENT
Start: 2021-03-16 | End: 2021-03-16 | Stop reason: HOSPADM

## 2021-03-16 RX ORDER — BUMETANIDE 0.25 MG/ML
2 INJECTION INTRAMUSCULAR; INTRAVENOUS ONCE
Status: COMPLETED | OUTPATIENT
Start: 2021-03-16 | End: 2021-03-16

## 2021-03-16 RX ORDER — DAPAGLIFLOZIN 10 MG/1
1 TABLET, FILM COATED ORAL DAILY
Qty: 90 TABLET | Refills: 0 | Status: SHIPPED | OUTPATIENT
Start: 2021-03-16 | End: 2021-06-17 | Stop reason: SDUPTHER

## 2021-03-16 RX ORDER — HYDROCODONE BITARTRATE AND ACETAMINOPHEN 7.5; 325 MG/1; MG/1
1 TABLET ORAL EVERY 6 HOURS PRN
Qty: 30 TABLET | Refills: 0 | Status: SHIPPED | OUTPATIENT
Start: 2021-03-16 | End: 2021-04-02 | Stop reason: ALTCHOICE

## 2021-03-16 RX ORDER — POLYETHYLENE GLYCOL 3350 17 G/17G
17 POWDER, FOR SOLUTION ORAL DAILY
Status: DISCONTINUED | OUTPATIENT
Start: 2021-03-16 | End: 2021-03-16 | Stop reason: HOSPADM

## 2021-03-16 RX ORDER — HYDROCODONE BITARTRATE AND ACETAMINOPHEN 7.5; 325 MG/1; MG/1
1 TABLET ORAL EVERY 4 HOURS PRN
Qty: 30 TABLET | Refills: 0 | Status: CANCELLED | OUTPATIENT
Start: 2021-03-16

## 2021-03-16 RX ORDER — AMIODARONE HYDROCHLORIDE 200 MG/1
200 TABLET ORAL DAILY
Status: DISCONTINUED | OUTPATIENT
Start: 2021-04-07 | End: 2021-03-16

## 2021-03-16 RX ORDER — AMIODARONE HYDROCHLORIDE 200 MG/1
200 TABLET ORAL ONCE
Status: DISCONTINUED | OUTPATIENT
Start: 2021-03-17 | End: 2021-03-16

## 2021-03-16 RX ORDER — AMIODARONE HYDROCHLORIDE 200 MG/1
200 TABLET ORAL EVERY 12 HOURS
Status: DISCONTINUED | OUTPATIENT
Start: 2021-03-24 | End: 2021-03-16

## 2021-03-16 RX ORDER — LOSARTAN POTASSIUM 25 MG/1
12.5 TABLET ORAL DAILY
Qty: 90 TABLET | Refills: 3 | Status: CANCELLED | OUTPATIENT
Start: 2021-03-16

## 2021-03-16 RX ORDER — AMIODARONE HYDROCHLORIDE 200 MG/1
200 TABLET ORAL EVERY 8 HOURS
Status: DISCONTINUED | OUTPATIENT
Start: 2021-03-17 | End: 2021-03-16

## 2021-03-16 RX ORDER — LOSARTAN POTASSIUM 25 MG/1
12.5 TABLET ORAL
Qty: 90 TABLET | Refills: 0 | Status: SHIPPED | OUTPATIENT
Start: 2021-03-16 | End: 2021-03-17

## 2021-03-16 RX ADMIN — FAMOTIDINE 20 MG: 20 TABLET, FILM COATED ORAL at 07:55

## 2021-03-16 RX ADMIN — BUMETANIDE 2 MG: 0.25 INJECTION, SOLUTION INTRAMUSCULAR; INTRAVENOUS at 09:50

## 2021-03-16 RX ADMIN — HYDROCODONE BITARTRATE AND ACETAMINOPHEN 1 TABLET: 7.5; 325 TABLET ORAL at 14:30

## 2021-03-16 RX ADMIN — DOCUSATE SODIUM 50MG AND SENNOSIDES 8.6MG 2 TABLET: 8.6; 5 TABLET, FILM COATED ORAL at 08:00

## 2021-03-16 RX ADMIN — HYDROCODONE BITARTRATE AND ACETAMINOPHEN 1 TABLET: 7.5; 325 TABLET ORAL at 06:55

## 2021-03-16 RX ADMIN — MORPHINE SULFATE 2 MG: 4 INJECTION, SOLUTION INTRAMUSCULAR; INTRAVENOUS at 00:20

## 2021-03-16 RX ADMIN — CARVEDILOL 6.25 MG: 6.25 TABLET, FILM COATED ORAL at 07:55

## 2021-03-16 RX ADMIN — LOSARTAN POTASSIUM 12.5 MG: 25 TABLET, FILM COATED ORAL at 09:48

## 2021-03-16 RX ADMIN — ASPIRIN 325 MG: 325 TABLET, COATED ORAL at 08:00

## 2021-03-16 NOTE — THERAPY TREATMENT NOTE
Patient Name: Sony Marks  : 1960    MRN: [id number]                              Today's Date: 3/16/2021       Admit Date: 3/11/2021    Visit Dx:     ICD-10-CM ICD-9-CM   1. Ischemic cardiomyopathy  I25.5 414.8   2. CAD in native artery  I25.10 414.01   3. Atrial fibrillation with RVR (CMS/HCC)  I48.91 427.31   4. Coronary artery disease involving native coronary artery of native heart with unstable angina pectoris (CMS/[facility])  I25.110 414.01     411.1   5. Type 2 diabetes mellitus without complication, without long-term current use of insulin (CMS/HCC)  E11.9 250.00     Patient Active Problem List   Diagnosis   • Coronary artery disease involving native coronary artery of native heart with unstable angina pectoris (CMS/[facility])   • Essential hypertension   • Hyperlipidemia LDL goal <70   • Type 2 diabetes mellitus, without long-term current use of insulin (CMS/HCC)   • Ischemic cardiomyopathy   • Atrial fibrillation with RVR ([facility])     Past Medical History:   Diagnosis Date   • Coronary artery disease    • Heart attack (CMS/[facility]) 2011   • Hyperlipidemia    • Hypertension    • Type 2 diabetes mellitus (CMS/[facility])      Past Surgical History:   Procedure Laterality Date   • CARDIAC CATHETERIZATION     • CARDIAC CATHETERIZATION N/A 3/8/2021    Procedure: Coronary angiography;  Surgeon: Sree Johnson MD;  Location: [facility] INVASIVE LOCATION;  Service: Cardiology;  Laterality: N/A;   • CARDIAC CATHETERIZATION N/A 3/8/2021    Procedure: Left Heart Cath;  Surgeon: Sree Johnson MD;  Location: [facility] INVASIVE LOCATION;  Service: Cardiology;  Laterality: N/A;   • COLONOSCOPY     • CORONARY ANGIOPLASTY WITH STENT PLACEMENT  2011   • CORONARY ARTERY BYPASS GRAFT N/A 3/11/2021    Procedure: MEDIAN STERNOTOMY, CORONARY ARTERY BYPASS GRAFTING x3, UTILIZATION OF LEFT INTERAL MAMMARY, EVH OF RIGHT GREATER SAPHENOUS VEIN, GERMAN PER ANESTHESIA;  Surgeon: Panfilo Hamm MD;  Location: [facility] OR;   Service: Cardiothoracic;  Laterality: N/A;  VO: 0845  VR: 0920       General Information     Row Name 03/16/21 0959 03/16/21 0845       Physical Therapy Time and Intention    Document Type  --  -EJ  therapy note (daily note)  -EJ    Mode of Treatment  --  -EJ  physical therapy  -EJ    Row Name 03/16/21 0959 03/16/21 0845       General Information    Patient Profile Reviewed  --  -EJ  yes  -EJ    Existing Precautions/Restrictions  --  -EJ  cardiac;sternal  -EJ    Row Name 03/16/21 0845          Stairs Within Home, Primary    Stairs, Within Home, Primary  has flight of stairs at mother's apartment where he is d/cing to.  -EJ     Row Name 03/16/21 0959 03/16/21 0845       Cognition    Orientation Status (Cognition)  --  -EJ  oriented x 4  -EJ    Row Name 03/16/21 0959 03/16/21 0845       Safety Issues, Functional Mobility    Safety Issues Affecting Function (Mobility)  --  -EJ  safety precautions follow-through/compliance;insight into deficits/self-awareness  -EJ    Impairments Affecting Function (Mobility)  --  -EJ  balance;endurance/activity tolerance;shortness of breath;strength;other (see comments)  -EJ    Cognitive Impairments, Mobility Safety/Performance  --  -EJ  safety precaution follow-through;safety precaution awareness  -EJ    Comment, Safety Issues/Impairments (Mobility)  --  Pt had instance of pain lateral chest wall, RN came to assess, appears to be from positioning of surgery per RN.  -EJ      User Key  (r) = Recorded By, (t) = Taken By, (c) = Cosigned By    Initials Name Provider Type    EJ Stacie Butler PT Physical Therapist        Mobility     Row Name 03/16/21 1005          Bed Mobility    Bed Mobility  sit-supine;supine-sit  -EJ     Supine-Sit [place] (Bed Mobility)  standby assist;verbal cues  -EJ     Sit-Supine [place] (Bed Mobility)  standby assist;verbal cues  -EJ     Comment (Bed Mobility)  VC for technique and breathing  -EJ     Row Name 03/16/21 1005          Transfers     Comment (Transfers)  VCs for hand placement for sternal precautions  -     Row Name 03/16/21 0846          Sit-Stand Transfer    Sit-Stand [place] (Transfers)  --  -     Row Name 03/16/21 1005 03/16/21 0846       Gait/Stairs (Locomotion)    [place] Level (Gait)  contact guard  -EJ  --  -EJ    Distance in Feet (Gait)  150  -EJ  --  -EJ    Deviations/Abnormal Patterns (Gait)  gustavo decreased;stride length decreased  -EJ  --  -EJ    Bilateral Gait Deviations  forward flexed posture  -EJ  --  -EJ    Number of Steps (Stairs)  22  -EJ  --  -EJ    Ascending Technique (Stairs)  step-to-step;step-over-step  -EJ  --  -EJ    Descending Technique (Stairs)  step-to-step;step-over-step  -EJ  --  -EJ    Comment (Gait/Stairs)  Pt ambulates up/down 2 flights of stairs, PT cued for rest break between bouts. Pt then attempted further ambulation, but had to turn back to room 2/2 pain in chest as described prior.  -EJ  --      User Key  (r) = Recorded By, (t) = Taken By, (c) = Cosigned By    Initials Name Provider Type     Stacie Butler PT Physical Therapist        Obj/Interventions     [facility] Name 03/16/21 1013          Motor Skills    Motor Skills  therapeutic exercise  -     Therapeutic Exercise  knee;ankle;shoulder;other (see comments)  -[facility] Name 03/16/21 1013          Shoulder (Therapeutic Exercise)    Shoulder (Therapeutic Exercise)  AROM (active range of motion)  -     Shoulder AROM (Therapeutic Exercise)  bilateral;aBduction;3 repetitions  -[facility] Name 03/16/21 1013          Knee (Therapeutic Exercise)    Knee AROM (Therapeutic Exercise)  bilateral;LAQ (long arc quad)  -[facility] Name 03/16/21 1013          Ankle (Therapeutic Exercise)    Ankle (Therapeutic Exercise)  AROM (active range of motion)  -     Ankle AROM (Therapeutic Exercise)  dorsiflexion;plantarflexion;10 repetitions  -[facility] Name 03/16/21 1013          Elbow/Forearm (Therapeutic Exercise)    Elbow/Forearm (Therapeutic  Exercise)  AROM (active range of motion)  -EJ     Elbow/Forearm AROM (Therapeutic Exercise)  bilateral;flexion;extension;3 repetitions  -EJ       User Key  (r) = Recorded By, (t) = Taken By, (c) = Cosigned By    Initials Name Provider Type    EJ Stacie Butler PT Physical Therapist        Goals/Plan    No documentation.       Clinical Impression     Row Name 03/16/21 1045          Pain    Additional Documentation  Pain Scale: Numbers Pre/Post-Treatment (Group)  -     Row Name 03/16/21 1045          Pain Scale: Numbers Pre/Post-Treatment    Pretreatment Pain Rating  2/10  -EJ     Posttreatment Pain Rating  6/10  -EJ     Pain Location  chest  -EJ     Pre/Posttreatment Pain Comment  pain level easing prior to PT exiting room, RN assessed pt while PT present  -EJ     Pain Intervention(s)  Repositioned;Ambulation/increased activity  -     Row Name 03/16/21 1045          Plan of Care Review    Plan of Care Reviewed With  patient  -EJ     Progress  improving  -EJ     Outcome Summary  Pt ascends/decends 22 stairs with CGA, cues for rest break between flights. Pt also ambulates 150 ft, limited by onset of pain at lateral ribs. RN assessed pt while PT present. Pt improved, VSS, and was able to continue bed mobility training and intro into ther ex program.  -     Row Name 03/16/21 1045          Vital Signs    Intra Systolic BP Rehab  147  -EJ     Intra Treatment Diastolic BP  93  -EJ     Intratreatment Heart Rate (beats/min)  95  -EJ     Intra SpO2 (%)  94  -EJ     O2 Delivery Intra Treatment  room air  -EJ     Pre Patient Position  Standing  -EJ     Intra Patient Position  Sitting  -EJ     Post Patient Position  Sitting  -     Row Name 03/16/21 1045          Positioning and Restraints    Pre-Treatment Position  sitting in chair/recliner  -EJ     Post Treatment Position  chair  -EJ     In Chair  reclined;call light within reach;encouraged to call for assist;notified nsg  -EJ       User Key  (r) = Recorded By, (t)  = Taken By, (c) = Cosigned By    Initials Name Provider Type    Stacie Torres PT Physical Therapist        Outcome Measures     Row Name 03/16/21 1049          How much help from another person do you currently need...    Turning from your back to your side while in flat bed without using bedrails?  4  -EJ     Moving from lying on back to sitting on the side of a flat bed without bedrails?  3  -EJ     Moving to and from a bed to a chair (including a wheelchair)?  4  -EJ     Standing up from a chair using your arms (e.g., wheelchair, bedside chair)?  4  -EJ     Climbing 3-5 steps with a railing?  3  -EJ     To walk in hospital room?  3  -EJ     AM-PAC 6 Clicks Score (PT)  21  -     Row Name 03/16/21 1049          Functional Assessment    Outcome Measure Options  AM-PAC 6 Clicks Basic Mobility (PT)  -EJ       User Key  (r) = Recorded By, (t) = Taken By, (c) = Cosigned By    Initials Name Provider Type    Stacie Torres PT Physical Therapist        Physical Therapy Education                 Title: PT OT SLP Therapies (In Progress)     Topic: Physical Therapy (In Progress)     Point: Mobility training (In Progress)     Learning Progress Summary           Patient Acceptance, E, NR by MARY at 3/16/2021 1050    Acceptance, E, NR by KG at 3/15/2021 0939    Acceptance, E, NR by KG at 3/14/2021 0850    Acceptance, E, NR by KG at 3/13/2021 1046    Acceptance, E, NR by RICO at 3/12/2021 1300                   Point: Home exercise program (In Progress)     Learning Progress Summary           Patient Acceptance, E, NR by EJ at 3/16/2021 1050    Acceptance, E, NR by KG at 3/15/2021 0939    Acceptance, E, NR by KG at 3/14/2021 0850    Acceptance, E, NR by KG at 3/13/2021 1046    Acceptance, E, NR by RICO at 3/12/2021 1300                   Point: Body mechanics (In Progress)     Learning Progress Summary           Patient Acceptance, E, NR by EJ at 3/16/2021 1050    Acceptance, E, NR by KG at 3/15/2021 0939     Acceptance, E, NR by KG at 3/14/2021 0850    Acceptance, E, NR by KG at 3/13/2021 1046    Acceptance, E, NR by RICO at 3/12/2021 1300                   Point: Precautions (In Progress)     Learning Progress Summary           Patient Acceptance, E, NR by EJ at 3/16/2021 1050    Acceptance, E, NR by KG at 3/15/2021 0939    Acceptance, E, NR by KG at 3/14/2021 0850    Acceptance, E, NR by KG at 3/13/2021 1046    Acceptance, E, NR by RICO at 3/12/2021 1300                               User Key     Initials Effective Dates Name Provider Type Discipline    RICO 06/19/15 -  Sarahi Gill, PT Physical Therapist PT    EJ 11/20/18 -  Stacie Butler, PT Physical Therapist PT    KG 05/22/20 -  Mahsa Hawthorne, PT Physical Therapist PT              PT Recommendation and Plan     Plan of Care Reviewed With: patient  Progress: improving  Outcome Summary: Pt ascends/decends 22 stairs with CGA, cues for rest break between flights. Pt also ambulates 150 ft, limited by onset of pain at lateral ribs. RN assessed pt while PT present. Pt improved, VSS, and was able to continue bed mobility training and intro into ther ex program.     Time Calculation:   PT Charges     Row Name 03/16/21 1050             Time Calculation    Start Time  0810  -EJ      PT Received On  03/16/21  -EJ      PT Goal Re-Cert Due Date  03/22/21  -EJ         Time Calculation- PT    Total Timed Code Minutes- PT  58 minute(s)  -EJ         Timed Charges    99683 - PT Therapeutic Exercise Minutes  13  -EJ      88905 - Gait Training Minutes   15  -EJ      95051 - PT Therapeutic Activity Minutes  30  -EJ        User Key  (r) = Recorded By, (t) = Taken By, (c) = Cosigned By    Initials Name Provider Type    EJ Stacie Butler, PT Physical Therapist        Therapy Charges for Today     Code Description Service Date Service Provider Modifiers Qty    19437054001 HC PT THER PROC EA 15 MIN 3/16/2021 Stacie Butler, PT GP 1    55198925599 HC GAIT TRAINING EA 15  MIN 3/16/2021 Stacie Butler, PT GP 1    96425154803 HC PT THERAPEUTIC ACT EA 15 MIN 3/16/2021 Stacie Butler, PT GP 2          PT G-Codes  Outcome Measure Options: AM-PAC 6 Clicks Basic Mobility (PT)  AM-PAC 6 Clicks Score (PT): 21    Stacie Mercedes, PT  3/16/2021

## 2021-03-16 NOTE — PROGRESS NOTES
Continued Stay Note  [facility]     Patient Name: Sony Marks  MRN: [id number]  Today's Date: 3/16/2021    Admit Date: 3/11/2021    Discharge Plan     Row Name 03/16/21 1120       Plan    Plan Comments  Per discussion with RN, O2 has been weaned and pt is tolerating RA.    Final Discharge Disposition Code  01 - home or self-care        Discharge Codes    No documentation.       Expected Discharge Date and Time     Expected Discharge Date Expected Discharge Time    Mar 16, 2021             Mary Souza RN

## 2021-03-16 NOTE — NURSING NOTE
Cardiology Navigator Note      Patient Name:  Sony Marks  :  1960  DOS:  21    Active Hospital Problems    Diagnosis    • **Coronary artery disease involving native coronary artery of native heart with unstable angina pectoris (CMS/HCC)      · Cardiac catheterization for anterior STEMI (): Status post PCI of the proximal LAD.  · Cardiac catheterization for unstable angina (3/8/2021): Severe 3-vessel CAD  · CABG by Panfilo Hamm (3/11/2021): LIMA to LAD, SVG to distal RCA/OM     • Atrial fibrillation with RVR (CMS/[facility])      · Occurred in the setting of postop CABG 3/13/2021  · Converted with IV amiodarone  · Chads Vasc=4      • Essential hypertension    • Hyperlipidemia LDL goal <70    • Type 2 diabetes mellitus, without long-term current use of insulin (CMS/[facility])    • Ischemic cardiomyopathy      · Echo (3/8/2021): LVEF 40%.  No significant valve abnormality         Consult has been received.  Medical records have been reviewed. Patient to be scheduled follow up 3-5 days post discharge.    Echo Results:  Normal left ventricular size with mildly reduced LV systolic function, LVEF approximately 40%.  2.  Hypokinesis of the mid anterior wall and apex.  3.  Grade 1 diastolic dysfunction.  4.  Normal right ventricular size and systolic function.  5.  Normal left atrial volume index.  6.  Mild calcification aortic valve without significant stenosis.    Heart Failure Quality Measures    ACE I, ARB, ARNI (if EF <40%)     Yes      Evidence-based Beta Blocker (EF<40%)    (Bisoprolol, Carvedilol, Metoprolol Succinate)  Yes      Aldosterone Antagonist (EF <40%)  If no contraindications:  Hypotension

## 2021-03-16 NOTE — PROGRESS NOTES
Cardiothoracic Surgery Progress Note      POD # 5 s/p CABG x 3     LOS: 5 days      Subjective:  No complaints today.  Wants to go home    Objective:  Vital Signs  Temp:  [98.1 °F (36.7 °C)-98.8 °F (37.1 °C)] 98.8 °F (37.1 °C)  Heart Rate:  [] 92  Resp:  [18-20] 18  BP: (104-127)/(62-85) 126/84    Physical Exam:   General Appearance: alert, appears stated age and cooperative   Lungs: clear to auscultation, respirations regular, respirations even and respirations unlabored   Heart: regular rhythm & normal rate, normal S1, S2 and no murmur, no gallop, no rub   Skin: Incision c/d/i     Results:    Results from last 7 days   Lab Units 03/16/21  0437   WBC 10*3/mm3 13.33*   HEMOGLOBIN g/dL 10.9*   HEMATOCRIT % 35.0*   PLATELETS 10*3/mm3 318     Results from last 7 days   Lab Units 03/16/21  0437   SODIUM mmol/L 140   POTASSIUM mmol/L 3.9   CHLORIDE mmol/L 101   CO2 mmol/L 30.0*   BUN mg/dL 19   CREATININE mg/dL 1.02   GLUCOSE mg/dL 140*   CALCIUM mg/dL 8.9       Assessment:  POD # 5 s/p CABG x 3    Plan:  Ambulate  Pulmonary toilet  ASA, statin, BB  Amio PO  D/C home    Panfilo Hamm MD  03/16/21  08:54 EDT

## 2021-03-16 NOTE — PROGRESS NOTES
Cardiology Progress Note      Reason for visit:    · Multivessel CAD status post CABG  · Hypotension  · Atrial fibrillation with RVR postop CABG  · Ischemic cardiomyopathy    IDENTIFICATION: 60-year-old gentleman who resides in [facility] Problems    Diagnosis  POA   • **Coronary artery disease involving native coronary artery of native heart with unstable angina pectoris (CMS/HCC) [I25.110]  Yes     Priority: High     · Cardiac catheterization for anterior STEMI (2011): Status post PCI of the proximal LAD.  · Cardiac catheterization for unstable angina (3/8/2021): Severe 3-vessel CAD  · CABG by Panfilo Hamm (3/11/2021): LIMA to LAD, SVG to distal RCA/OM     • Type 2 diabetes mellitus, without long-term current use of insulin (CMS/[facility]) [E11.9]  Yes     Priority: Medium   • Atrial fibrillation with RVR (CMS/[facility]) [I48.91]  Yes     · Occurred in the setting of postop CABG 3/13/2021  · Converted with IV amiodarone  · Chads Vasc=4      • Essential hypertension [I10]  Yes   • Hyperlipidemia LDL goal <70 [E78.5]  Yes   • Ischemic cardiomyopathy [I25.5]  Yes     · Echo (3/8/2021): LVEF 40%.  No significant valve abnormality              · No issues  · Patient would like to ambulate in hallway and down stairs prior to going home         Vital Sign Min/Max for last 24 hours  Temp  Min: 98.1 °F (36.7 °C)  Max: 98.8 °F (37.1 °C)   BP  Min: 102/62  Max: 127/77   Pulse  Min: 72  Max: 142   Resp  Min: 18  Max: 20   SpO2  Min: 89 %  Max: 97 %   Flow (L/min)  Min: 2  Max: 2      Intake/Output Summary (Last 24 hours) at 3/16/2021 0746  Last data filed at 3/15/2021 1923  Gross per 24 hour   Intake 1080 ml   Output 375 ml   Net 705 ml           Physical Exam  Constitutional:       Appearance: Normal appearance.   HENT:      Head: Normocephalic.   Cardiovascular:      Rate and Rhythm: Normal rate and regular rhythm.      Heart sounds: No murmur.   Pulmonary:      Effort: Pulmonary effort is normal.    Neurological:      Mental Status: He is alert.          Tele: NSR     Results Review (reviewed the patient's recent labs in the electronic medical record):      EKG (3/12/2021): Normal sinus rhythm     CXR (3/14/2021): Cardiomegaly and low lung volumes with atelectasis     ECHO (3/8/2021): LVEF 40%.  Hypokinesis of mid anterior wall and apex.  Grade 1 diastolic dysfunction.  Mild calcification of aortic valve without significant stenosis         Results from last 7 days   Lab Units 03/16/21  0437 03/15/21  1617 03/15/21  0339 03/14/21  0408 03/13/21  0355 03/12/21  0319 03/12/21  0016 03/11/21 2008   SODIUM mmol/L 140  --  137 136 133* 141 141 141   POTASSIUM mmol/L 3.9 4.4 3.4* 4.3 4.3 4.2 4.2 4.0   CHLORIDE mmol/L 101  --  98 99 98 104 104 103   BUN mg/dL 19  --  17 26* 26* 22* 23* 23*   CREATININE mg/dL 1.02  --  0.87 1.08 1.14 1.55* 1.56* 1.57*   MAGNESIUM mg/dL  --   --   --   --   --  3.0* 2.7* 1.9         Results from last 7 days   Lab Units 03/16/21  0437 03/15/21  0702 03/14/21  0408   WBC 10*3/mm3 13.33* 15.53* 18.51*   HEMOGLOBIN g/dL 10.9* 11.2* 10.9*   HEMATOCRIT % 35.0* 35.5* 34.4*   PLATELETS 10*3/mm3 318 268 205       Lab Results   Component Value Date    HGBA1C 6.60 (H) 03/08/2021       Lab Results   Component Value Date    CHOL 137 03/08/2021    TRIG 88 03/08/2021    HDL 49 03/08/2021    LDL 71 03/08/2021              Coronary disease with unstable angina   · status post CABG, 3/11/2021  · Continue aspirin, beta-blocker, and statin    Postoperative atrial fibrillation with RVR  · Occurred on 3/13/2021  · Converted with IV amiodarone  · Currently maintaining normal sinus rhythm     Chronic systolic heart failure/ischemic cardiomyopathy  · LVEF 40%  · We will transition to metoprolol succinate 25 mg daily once taking oral and hemodynamically stable  · Start Entresto 24-26 mg once BP stable  · Farxiga at discharge for diabetes and CHF     Hyperlipidemia with goal LDL <70  · Controlled  · Continue  atorvastatin 80 mg daily     Type 2 diabetes mellitus  · Controlled  · Consider SGLT2 inhibitor therapy at discharge for DM/CHF                      · Patient ambulate in hallway and up and down stairs today  · Continue aspirin, beta-blocker and statin  · Add low-dose losartan for HFrEF  · Patient appears okay for discharge from my perspective.  Will let Dr. Hamm see.  Patient does have some apprehension about going home    Electronically signed by David Shah IV, MD, 03/14/21, 11:09 AM EDT.

## 2021-03-16 NOTE — PLAN OF CARE
Goal Outcome Evaluation:  Plan of Care Reviewed With: patient  Progress: improving  Outcome Summary: Pt ascends/decends 22 stairs with CGA, cues for rest break between flights. Pt also ambulates 150 ft, limited by onset of pain at lateral ribs. RN assessed pt while PT present. Pt improved, VSS, and was able to continue bed mobility training and intro into ther ex program.

## 2021-03-17 ENCOUNTER — READMISSION MANAGEMENT (OUTPATIENT)
Dept: CALL CENTER | Facility: HOSPITAL | Age: 61
End: 2021-03-17

## 2021-03-17 ENCOUNTER — TELEPHONE (OUTPATIENT)
Dept: CARDIOLOGY | Facility: HOSPITAL | Age: 61
End: 2021-03-17

## 2021-03-17 RX ORDER — LOSARTAN POTASSIUM 25 MG/1
12.5 TABLET ORAL
Qty: 90 TABLET | Refills: 0 | Status: SHIPPED | OUTPATIENT
Start: 2021-03-17 | End: 2021-11-22 | Stop reason: SDUPTHER

## 2021-03-17 RX ORDER — CARVEDILOL 6.25 MG/1
6.25 TABLET ORAL 2 TIMES DAILY WITH MEALS
Qty: 180 TABLET | Refills: 0 | Status: SHIPPED | OUTPATIENT
Start: 2021-03-17 | End: 2021-08-24 | Stop reason: SDUPTHER

## 2021-03-17 NOTE — PAYOR COMM NOTE
"Mariela Briones RN  Utilization Review  P: [phone]  F: [phone]    Ref # [id number]  DC date = 3/16/2021    Sony Marks (60 y.o. Male)     Date of Birth Social Security Number Address Home Phone MRN    1960  [address] [id number] [phone] [id number]    Church Marital Status          None Single       Admission Date Admission Type Admitting Provider Attending Provider Department, Room/Bed    3/11/21 Elective Panfilo Hamm MD  [address], [address]/1    Discharge Date Discharge Disposition Discharge Destination        3/16/2021 Home or Self Care              Attending Provider: (none)   Allergies: No Known Allergies    Isolation: None   Infection: None   Code Status: Prior    Ht: 180.3 cm (70.98\")   Wt: 109 kg (241 lb)    Admission Cmt: None   Principal Problem: Coronary artery disease involving native coronary artery of native heart with unstable angina pectoris (CMS/[facility]) [I25.110] More...                 Active Insurance as of 3/11/2021     Primary Coverage     Payor Plan Insurance Group Employer/Plan Group    [employer] [place] [employer]ICA[employer]      Payor Plan Address Payor Plan Phone Number Payor Plan Fax Number Effective Dates    [address] [address] [phone]  2/23/2021 - None Entered    [facility] [id number]       Subscriber Name Subscriber Birth Date Member ID       SONY MARKS 1960 [id number]                 Emergency Contacts      (Rel.) Home Phone Work Phone Mobile Phone    armando francois (Significant Other) [phone] -- --    FRANSISCO MARKS (Sister) [phone] -- --               Physician Progress Notes (most recent note)      Panfilo Hamm MD at 03/16/21 0854          Cardiothoracic Surgery Progress Note      POD # 5 s/p CABG x 3     LOS: 5 days      Subjective:  No complaints today.  Wants to go home    Objective:  Vital Signs  Temp:  [98.1 °F (36.7 °C)-98.8 °F (37.1 °C)] 98.8 °F (37.1 °C)  Heart Rate:  [] " 92  Resp:  [18-20] 18  BP: (104-127)/(62-85) 126/84    Physical Exam:   General Appearance: alert, appears stated age and cooperative   Lungs: clear to auscultation, respirations regular, respirations even and respirations unlabored   Heart: regular rhythm & normal rate, normal S1, S2 and no murmur, no gallop, no rub   Skin: Incision c/d/i     Results:    Results from last 7 days   Lab Units 03/16/21  0437   WBC 10*3/mm3 13.33*   HEMOGLOBIN g/dL 10.9*   HEMATOCRIT % 35.0*   PLATELETS 10*3/mm3 318     Results from last 7 days   Lab Units 03/16/21  0437   SODIUM mmol/L 140   POTASSIUM mmol/L 3.9   CHLORIDE mmol/L 101   CO2 mmol/L 30.0*   BUN mg/dL 19   CREATININE mg/dL 1.02   GLUCOSE mg/dL 140*   CALCIUM mg/dL 8.9       Assessment:  POD # 5 s/p CABG x 3    Plan:  Ambulate  Pulmonary toilet  ASA, statin, BB  Amio PO  D/C home    Panfilo Hamm MD  03/16/21  08:54 EDT          Electronically signed by Panfilo Hamm MD at 03/16/21 0855       Discharge Summary    No notes of this type exist for this encounter.

## 2021-03-17 NOTE — OUTREACH NOTE
Prep Survey      Responses   [facility] facility patient discharged from?  [place]   Is LACE score < 7 ?  No   Emergency Room discharge w/ pulse ox?  No   Eligibility  Readm Mgmt   Discharge diagnosis  Unstable angina coronary artery bypass grafting   Does the patient have one of the following disease processes/diagnoses(primary or secondary)?  Cardiothoracic surgery   Does the patient have Home health ordered?  No   Is there a DME ordered?  No   Prep survey completed?  Yes          Elodia Bunn RN

## 2021-03-17 NOTE — TELEPHONE ENCOUNTER
Patient Name:  Sony Marks  :  1960  DOS:  22    Patient Active Problem List   Diagnosis   • Coronary artery disease involving native coronary artery of native heart with unstable angina pectoris (CMS/[facility])   • Essential hypertension   • Hyperlipidemia LDL goal <70   • Type 2 diabetes mellitus, without long-term current use of insulin (CMS/[facility])   • Ischemic cardiomyopathy   • Atrial fibrillation with RVR (CMS/[facility])         Consult received while patient was inpatient. Patient called to evaluate symptoms post discharge.    Education provided.   Signs / symptoms and Role of Heart and Valve Center and when to call    Education time 5minutes.  Patient scheduled to follow up in clinic 3/23

## 2021-03-19 ENCOUNTER — READMISSION MANAGEMENT (OUTPATIENT)
Dept: CALL CENTER | Facility: HOSPITAL | Age: 61
End: 2021-03-19
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
## 2021-03-23 ENCOUNTER — OFFICE VISIT (OUTPATIENT)
Dept: CARDIOLOGY | Facility: HOSPITAL | Age: 61
End: 2021-03-23

## 2021-03-23 VITALS
RESPIRATION RATE: 20 BRPM | HEART RATE: 86 BPM | DIASTOLIC BLOOD PRESSURE: 70 MMHG | BODY MASS INDEX: 32.39 KG/M2 | WEIGHT: 231.38 LBS | HEIGHT: 71 IN | SYSTOLIC BLOOD PRESSURE: 120 MMHG | OXYGEN SATURATION: 97 % | TEMPERATURE: 98.9 F

## 2021-03-23 DIAGNOSIS — I25.10 CORONARY ARTERY DISEASE INVOLVING NATIVE CORONARY ARTERY OF NATIVE HEART WITHOUT ANGINA PECTORIS: Primary | ICD-10-CM

## 2021-03-23 DIAGNOSIS — I48.0 PAF (PAROXYSMAL ATRIAL FIBRILLATION) (HCC): ICD-10-CM

## 2021-03-23 DIAGNOSIS — I10 ESSENTIAL HYPERTENSION: ICD-10-CM

## 2021-03-23 DIAGNOSIS — I25.5 ISCHEMIC CARDIOMYOPATHY: ICD-10-CM

## 2021-03-23 PROCEDURE — 99214 OFFICE O/P EST MOD 30 MIN: CPT | Performed by: NURSE PRACTITIONER

## 2021-03-23 NOTE — PROGRESS NOTES
"Chief Complaint  Establish Care and Atrial Fibrillation    Subjective    History of Present Illness {CC  Problem List  Visit  Diagnosis   Encounters  Notes  Medications  Labs  Result Review Imaging  Media :23}       History of Present Illness   60-year-old male presents the office today for ongoing evaluation of his ischemic cardiomyopathy, coronary artery disease and PAF.  Patient underwent a CABG x3 per Dr. Hamm on 3/11/2021.  Postoperatively patient had A. fib with RVR and converted back to normal sinus rhythm with IV amiodarone.  He reports 1 episode since discharge of a fast heart rate that lasted only for a few seconds and then returned to normal.  He presents today with home heart rate and blood pressure log showing heart rate 77-97 over the last week.  Weight has been decreasing and patient has lost 9 pounds since 3/18/2021.  Patient's blood pressure readings are quite variable but he reports that he is using a wrist cuff and does not believe that those readings are accurate.  Blood pressure readings in the office are 120s systolic.  Patient reports mild chest pain (incisional) and notes he is only taking his hydrocodone once a day.  Notes that he tires easily.  Currently denies palpitations, dyspnea, presyncope, syncope, orthopnea, PND or pedal edema.  Objective     Vital Signs:   Vitals:    03/23/21 1042 03/23/21 1043 03/23/21 1046   BP: 127/67 147/81 120/70   BP Location: Right arm Left arm Left arm   Patient Position: Sitting Standing Sitting   Cuff Size: Adult Adult Adult   Pulse: 86 100 86   Resp:   20   Temp:   98.9 °F (37.2 °C)   TempSrc:   Temporal   SpO2: 96% 97% 97%   Weight:   105 kg (231 lb 6 oz)   Height:   180.3 cm (70.98\")     Body mass index is 32.29 kg/m².  Physical Exam  Vitals and nursing note reviewed.   Constitutional:       Appearance: Normal appearance.   HENT:      Head: Normocephalic.   Eyes:      Pupils: Pupils are equal, round, and reactive to light.   Cardiovascular: "      Rate and Rhythm: Normal rate and regular rhythm.      Pulses: Normal pulses.      Heart sounds: Normal heart sounds. No murmur heard.     Pulmonary:      Effort: Pulmonary effort is normal.      Breath sounds: Normal breath sounds.   Abdominal:      General: Bowel sounds are normal.      Palpations: Abdomen is soft.   Musculoskeletal:         General: Normal range of motion.      Cervical back: Normal range of motion.      Right lower leg: No edema.      Left lower leg: No edema.      Comments: Compression socks in place bilateral LEs   Skin:     General: Skin is warm and dry.      Capillary Refill: Capillary refill takes less than 2 seconds.      Comments: midsternal well healed with scab intact   Right evh healing with scab intact    Neurological:      Mental Status: He is alert and oriented to person, place, and time.   Psychiatric:         Mood and Affect: Mood normal.         Thought Content: Thought content normal.              Result Review  Data Reviewed:{ Labs  Result Review  Imaging  Med Tab  Media :23}   CBC (No Diff) (03/16/2021 04:37)  Comprehensive Metabolic Panel (03/16/2021 04:37)    Cardiac Catheterization/Vascular Study (03/08/2021 10:26)  ECG 12 Lead (03/05/2021 08:29)  Adult Transthoracic Echo Complete W/ Cont if Necessary Per Protocol (03/08/2021 12:17)             Assessment and Plan {CC Problem List  Visit Diagnosis  ROS  Review (Popup)  Health Maintenance  Quality  BestPractice  Medications  SmartSets  SnapShot Encounters  Media :23}   1. Coronary artery disease involving native coronary artery of native heart without angina pectoris  Status post CABG x3 on 3/11/2021  Continue aspirin, Lipitor, carvedilol    2. Ischemic cardiomyopathy  EF 40% per echo  Continue carvedilol losartan  Heart failure education today including signs and symptoms, the role of the heart failure center, daily weights, low sodium diet (less than 1500 mg per day), and daily physical activity.  Reviewed HF Zones with patient and family.  Patient to continue current medications as previously ordered.   3. Essential hypertension  Encourage patient to purchase blood pressure cuff in place of wrist cuff.  Home blood pressure readings are not consistent with office blood pressure readings.  Patient agreed to purchase blood pressure cuff with upper arm cuff  HTN Education provided today including signs and symptoms, medication management, daily blood pressure monitoring. Patient encouraged to call the Heart and Valve center with any abnormal readings.   4. PAF (paroxysmal atrial fibrillation) (CMS/[facility])  Currently maintaining normal sinus rhythm  CHADS-VASc Risk Assessment            2 Total Score    1 Hypertension    1 DM        Criteria that do not apply:    CHF    Age >/= 75    PRIOR STROKE/TIA/THROMBO    Vascular Disease    Age 65-74    Sex: Female          45 minutes spent with patient for assessment, plan of care and education     Follow Up {Instructions Charge Capture  Follow-up Communications :23}   Return if symptoms worsen or fail to improve.    Patient was given instructions and counseling regarding his condition or for health maintenance advice. Please see specific information pulled into the AVS if appropriate.  Patient was instructed to call the Heart and Valve Center with any questions, concerns, or worsening symptoms.    *Please note that portions of this note were completed with a voice recognition program. Efforts were made to edit the dictations, but occasionally words are mistranscribed.

## 2021-03-24 NOTE — DISCHARGE SUMMARY
5        CTS Discharge Summary    Patient Care Team:  Johan Tse MD as PCP - General (Internal Medicine)  Sree Johnson MD as Cardiologist (Cardiology)      Date of Admission: 3/11/2021  5:26 AM  Date of Discharge: 3/16/2021    Discharge Diagnosis  Past Medical History:   Diagnosis Date   • Coronary artery disease    • Heart attack (CMS/HCC) 01/2011   • Hyperlipidemia    • Hypertension    • Type 2 diabetes mellitus (CMS/HCC)          Coronary artery disease involving native coronary artery of native heart with unstable angina pectoris (CMS/HCC)    Essential hypertension    Hyperlipidemia LDL goal <70    Type 2 diabetes mellitus, without long-term current use of insulin (CMS/HCC)    Ischemic cardiomyopathy    Atrial fibrillation with RVR (CMS/[facility])      History of Present Illness  60-year-old  male with a history of hypertension, hyperlipidemia, coronary artery disease status post stenting, remote tobacco abuse and newly diagnosed diabetes who presents with chest pain.  For the past 10 to 12 days, the patient notes worsening substernal chest pain.  He describes this is a significant ache that starts in the back and radiates to the middle of his chest.  He has associated fatigue and shortness of breath with significant exertion.  The patient lost his previous primary care physician and had not taken his medications for several years.      Hospital Course  The patient is a 60-year-old male who was admitted on 3/11/2021 and underwent coronary artery bypass grafting x3 performed by Dr. Panfilo Hamm.  The patient tolerated the procedure well without any immediate complications and was transferred to the intensive care unit intubated and in stable condition.  On postoperative day #1, the patient was doing well having previous been extubated.  Patient was hemodynamically stable however requiring mild pressor support on a Levophed drip however this was ultimately titrated off.  Patient had a [place]  catheter removed without difficulty.  On postoperative day #2, the patient had the temporary epicardial pacing wires and chest tubes removed without difficulty.  Over the course of the next several days, the patient made slow but steady progress working with the nursing staff and physical therapy to ambulate in the hallway and improve his functional status.  On 3/15/2021, the patient was experiencing postoperative atrial fibrillation and was therefore started on an amiodarone drip as managed by cardiology.  The patient was ready for transfer to telemetry.  On 3/16/2021, the patient met criteria for discharge and was discharged home without difficulty.    Procedures Performed  Procedure(s):  MEDIAN STERNOTOMY, CORONARY ARTERY BYPASS GRAFTING x3, UTILIZATION OF LEFT INTERAL MAMMARY, EVH OF RIGHT GREATER SAPHENOUS VEIN, GERMAN PER ANESTHESIA       Consults:   Consults     Date and Time Order Name Status Description    3/11/2021 12:46 PM Inpatient Consult to Cardiology Completed         Intensivist      Discharge Medications     Discharge Medications      New Medications      Instructions Start Date   Farxiga 10 MG tablet  Generic drug: Dapagliflozin Propanediol  Notes to patient: To lower your blood sugar   10 mg, Oral, Daily      HYDROcodone-acetaminophen 7.5-325 MG per tablet  Commonly known as: NORCO  Notes to patient: For pain   1 tablet, Oral, Every 6 Hours PRN      losartan 25 MG tablet  Commonly known as: COZAAR   12.5 mg, Oral, Every 24 Hours Scheduled         Changes to Medications      Instructions Start Date   carvedilol 6.25 MG tablet  Commonly known as: COREG  What changed: medication strength   6.25 mg, Oral, 2 Times Daily With Meals      nitroglycerin 0.4 MG SL tablet  Commonly known as: NITROSTAT  What changed:   · how much to take  · how to take this  · when to take this  · reasons to take this  Notes to patient: For chest pain   1 under the tongue as needed for angina, may repeat q5mins for up three  doses         Continue These Medications      Instructions Start Date   aspirin 81 MG EC tablet  Notes to patient: For your heart to prevent blockages   81 mg, Oral, Daily      atorvastatin 80 MG tablet  Commonly known as: LIPITOR  Notes to patient: To lower your cholesterol   80 mg, Oral, Daily         Stop These Medications    isosorbide mononitrate 60 MG 24 hr tablet  Commonly known as: IMDUR     lisinopril 20 MG tablet  Commonly known as: PRINIVIL,ZESTRIL     metFORMIN 500 MG tablet  Commonly known as: Glucophage            Discharge Diet:   Diet Instructions     Diet: Cardiac, Consistent Carbohydrate      Discharge Diet:  Cardiac  Consistent Carbohydrate             Activity at Discharge:   Activity Instructions     Activity as Tolerated      Other Activity Instructions      Activity Instructions: No lifting greater than 10 pounds and no driving until follow-up appointment.  No driving while taking narcotics.        Do not drive while taking narcotics    Wound Care:  Monitor surgical wounds daily. Keep incisons clean and dry.   Call CT Surgery office [phone]  with any questions or concerns, specifically let them know of increased redness, drainage, or opening up of incision.     Follow-up Appointments  Future Appointments   Date Time Provider Department Center   4/2/2021 11:30 AM Sree Johnson MD [facility] None   5/11/2021  3:15 PM Tatiana Wolf APRN [facility] None          LICO Og  03/24/21  09:47 EDT

## 2021-03-29 ENCOUNTER — READMISSION MANAGEMENT (OUTPATIENT)
Dept: CALL CENTER | Facility: HOSPITAL | Age: 61
End: 2021-03-29

## 2021-03-29 NOTE — OUTREACH NOTE
CT Surgery Week 2 Survey      Responses   [facility] patient discharged from?  [place]   Does the patient have one of the following disease processes/diagnoses(primary or secondary)?  Cardiothoracic surgery   Week 2 attempt successful?  Yes   Call start time  1410   Call end time  1420   General alerts for this patient  Please call Claudia as she will have info on pt   Discharge diagnosis  Unstable angina coronary artery bypass grafting   Is patient permission given to speak with other caregiver?  Yes   List who call center can speak with  Wife--Claudia   Person spoke with today (if not patient) and relationship  Wife   Meds reviewed with patient/caregiver?  Yes   Is the patient taking all medications as directed (includes completed medication regime)?  Yes   Has the patient kept scheduled appointments due by today?  Yes   Has home health visited the patient within 72 hours of discharge?  N/A   Psychosocial issues?  No   What is the patient's perception of their health status since discharge?  Improving   Nursing interventions  Nurse provided patient education   Is the patient/caregiver able to teach back normal signs of recovery?  Nausea and lack of appetite, Depression or irritability   Nursing interventions  Reassured on normal signs of recovery   Is the patient /caregiver able to teach back basic post-op care?  Take showers only when approved by MD-sponge bathe until then, No tub bath, swimming, or hot tub until instructed by MD, Lifting as instructed by MD in discharge instructions   Is the patient/caregiver able to teach back signs and symptoms of incisional infection?  Increased redness, swelling or pain at the incisonal site, Increased drainage or bleeding, Incisional warmth, Pus or odor from incision, Fever   Is the patient/caregiver able to teach back steps to recovery at home?  Rest and rebuild strength, gradually increase activity   Is the patient/caregiver able to teach back the hierarchy of who to  call/visit for symptoms/problems? PCP, Specialist, Home health nurse, Urgent Care, ED, 911  Yes   Additional teach back comments  Enc good po and pt is walking alot after surgery. Wife states leg is tight but pt ambulating and I have enc several  exercises he can do while in chair. Has some fatigue and has been tired.   Week 2 call completed?  Yes          Nan Mayers RN

## 2021-04-02 ENCOUNTER — OFFICE VISIT (OUTPATIENT)
Dept: CARDIOLOGY | Facility: CLINIC | Age: 61
End: 2021-04-02

## 2021-04-02 VITALS
HEIGHT: 71 IN | BODY MASS INDEX: 32.2 KG/M2 | DIASTOLIC BLOOD PRESSURE: 80 MMHG | OXYGEN SATURATION: 98 % | HEART RATE: 77 BPM | RESPIRATION RATE: 18 BRPM | SYSTOLIC BLOOD PRESSURE: 120 MMHG | WEIGHT: 230 LBS

## 2021-04-02 DIAGNOSIS — I10 ESSENTIAL HYPERTENSION: Primary | ICD-10-CM

## 2021-04-02 DIAGNOSIS — I25.110 CORONARY ARTERY DISEASE INVOLVING NATIVE CORONARY ARTERY OF NATIVE HEART WITH UNSTABLE ANGINA PECTORIS (HCC): ICD-10-CM

## 2021-04-02 DIAGNOSIS — I48.91 ATRIAL FIBRILLATION WITH RVR (HCC): ICD-10-CM

## 2021-04-02 DIAGNOSIS — E11.00 TYPE 2 DIABETES MELLITUS WITH HYPEROSMOLARITY WITHOUT COMA, WITHOUT LONG-TERM CURRENT USE OF INSULIN (HCC): Chronic | ICD-10-CM

## 2021-04-02 DIAGNOSIS — I25.10 CORONARY ARTERY DISEASE INVOLVING NATIVE CORONARY ARTERY OF NATIVE HEART WITHOUT ANGINA PECTORIS: ICD-10-CM

## 2021-04-02 DIAGNOSIS — I25.5 ISCHEMIC CARDIOMYOPATHY: ICD-10-CM

## 2021-04-02 PROCEDURE — 99214 OFFICE O/P EST MOD 30 MIN: CPT | Performed by: INTERNAL MEDICINE

## 2021-04-02 RX ORDER — HYDROCODONE BITARTRATE AND ACETAMINOPHEN 10; 325 MG/1; MG/1
1 TABLET ORAL EVERY 8 HOURS PRN
Qty: 15 TABLET | Refills: 0 | Status: SHIPPED | OUTPATIENT
Start: 2021-04-02 | End: 2021-07-06

## 2021-04-02 NOTE — PROGRESS NOTES
[facility] Cardiology Office Follow Up Note    Sony Marks  [id number]  2021    Primary Care Provider: Johan Tse MD    Chief Complaint: Hospital follow-up    History of Present Illness:   Mr. Sony Marks is a 60 y.o. male who presents to the Cardiology Clinic for hospital follow-up.The patient has a past medical history significant for hypertension and prior tobacco use.  He has a past cardiac history significant for an anterior STEMI in .  At that time, he reportedly underwent thrombolytic therapy and subsequent thrombectomy with PCI and placement of a 3.0 x 28 mm stent in the LAD.  Most recently, he underwent coronary angiography in  for unstable angina was found to have severe multivessel disease.  He subcentimeter 3V CABG with a LIMA-LAD, SVG-RCA, and SVG-OM1.  He presents the cardiology clinic today for follow-up after undergoing CABG.  Today, the patient reports she is doing well from a cardiac standpoint.  He denies any recurrent anginal symptoms.  He continues to have difficulty with postoperative sternal pain.  No significant lower extremity edema.  He continues to tolerate his cardiac medications without difficulty.  No other specific complaints today.    Past Cardiac Testin. Last Coronary Angio: 3/8/2021   1.  Severe multivessel coronary artery disease including:                 -Patent stent in the proximal LAD with severe stenosis in the mid LAD at the outflow of the prior stent                 -Severe stenosis in the proximal portion of a large OM1, with a widely patent stent in the mid OM1                 -Severe stenosis in the proximal RCA   2.  Moderately elevated LVEDP, 25 mmHg.  2. Prior Stress Testing: None  3. Last Echo: 3/8/2021   1.  Normal left ventricular size with mildly reduced LV systolic function, LVEF approximately 40%.   2.  Hypokinesis of the mid anterior wall and apex.   3.  Grade 1 diastolic dysfunction.   4.   Normal right ventricular size and systolic function.   5.  Normal left atrial volume index.   6.  Mild calcification aortic valve without significant stenosis.  4. Prior Holter Monitor: None    Review of Systems:   Review of Systems   Constitutional: Negative for activity change, appetite change, chills, diaphoresis, fatigue, fever, unexpected weight gain and unexpected weight loss.   Eyes: Negative for blurred vision and double vision.   Respiratory: Negative for cough, chest tightness, shortness of breath and wheezing.    Cardiovascular: Negative for chest pain, palpitations and leg swelling.   Gastrointestinal: Negative for abdominal pain, anal bleeding, blood in stool and GERD.   Endocrine: Negative for cold intolerance and heat intolerance.   Genitourinary: Negative for hematuria.   Musculoskeletal:        Sternal pain   Neurological: Negative for dizziness, syncope, weakness and light-headedness.   Hematological: Does not bruise/bleed easily.   Psychiatric/Behavioral: Negative for depressed mood and stress. The patient is not nervous/anxious.        I have reviewed and/or updated the patient's past medical, past surgical, family, social history, problem list and allergies as appropriate.     Medications:     Current Outpatient Medications:   •  aspirin 81 MG EC tablet, Take 1 tablet by mouth Daily., Disp: 180 tablet, Rfl: 3  •  atorvastatin (LIPITOR) 80 MG tablet, Take 1 tablet by mouth Daily., Disp: 90 tablet, Rfl: 3  •  carvedilol (COREG) 6.25 MG tablet, Take 1 tablet by mouth 2 (Two) Times a Day With Meals., Disp: 180 tablet, Rfl: 0  •  Dapagliflozin Propanediol (Farxiga) 10 MG tablet, Take 10 mg by mouth Daily., Disp: 90 tablet, Rfl: 0  •  HYDROcodone-acetaminophen (NORCO) 7.5-325 MG per tablet, Take 1 tablet by mouth Every 6 (Six) Hours As Needed for Moderate Pain ., Disp: 30 tablet, Rfl: 0  •  losartan (COZAAR) 25 MG tablet, Take 0.5 tablets by mouth Daily., Disp: 90 tablet, Rfl: 0  •  nitroglycerin  "(NITROSTAT) 0.4 MG SL tablet, 1 under the tongue as needed for angina, may repeat q5mins for up three doses (Patient taking differently: Place 0.4 mg under the tongue Every 5 (Five) Minutes As Needed. 1 under the tongue as needed for angina, may repeat q5mins for up three doses), Disp: 30 tablet, Rfl: 3  No current facility-administered medications for this visit.    Facility-Administered Medications Ordered in Other Visits:   •  Chlorhexidine Gluconate Cloth 2 % pads 1 application, 1 application, Topical, Q12H PRN, Toma Alba PA-C    Physical Exam:  Vital Signs:   Vitals:    04/02/21 1122   Resp: 18   Weight: 104 kg (230 lb)   Height: 180.3 cm (71\")       Physical Exam  Constitutional:       General: He is not in acute distress.     Appearance: Normal appearance. He is well-developed. He is not diaphoretic.   HENT:      Head: Normocephalic and atraumatic.   Eyes:      General: No scleral icterus.     Pupils: Pupils are equal, round, and reactive to light.   Neck:      Trachea: No tracheal deviation.   Cardiovascular:      Rate and Rhythm: Normal rate and regular rhythm.      Heart sounds: Normal heart sounds. No murmur heard.   No friction rub. No gallop.       Comments: Normal JVD.  Pulmonary:      Effort: Pulmonary effort is normal. No respiratory distress.      Breath sounds: Normal breath sounds. No stridor. No wheezing or rales.   Chest:      Chest wall: No tenderness.   Abdominal:      General: Bowel sounds are normal. There is no distension.      Palpations: Abdomen is soft.      Tenderness: There is no abdominal tenderness. There is no guarding or rebound.   Musculoskeletal:         General: No swelling. Normal range of motion.   Lymphadenopathy:      Cervical: No cervical adenopathy.   Skin:     General: Skin is warm and dry.      Findings: No erythema.      Comments: Sternal site appears to be healing well   Neurological:      General: No focal deficit present.      Mental Status: He is alert and " oriented to person, place, and time.   Psychiatric:         Mood and Affect: Mood normal.         Behavior: Behavior normal.         Results Review:   I reviewed the patient's new clinical results.        Assessment / Plan:     1.    Multivessel coronary artery disease  --History of anterior MI in 2011 with PCI to the LAD  --Found multivessel CAD in 3/21, underwent 3V CABG  --Currently doing well from a cardiac standpoint with no recurrent anginal symptoms  --Continue aspirin and high intensity statin  --Will give an additional short course of Norco due to postoperative pain  --Has follow-up scheduled with CT surgery  --Follow-up in 3 months, or sooner if required    2.  Postoperative atrial fibrillation  --Appears to have been of limited episode  --In NSR today  --Will obtain outpatient cardiac monitoring upon next follow-up to evaluate for recurrent PAF     3.   Ischemic cardiomyopathy  --LVEF 40%  --Currently euvolemic, NYHA class I  --Continue carvedilol and losartan    4.  Hypertension  --Currently well controlled     5. Hyperlipidemia  --On high intensity statin    6.  Type 2 diabetes mellitus  --On Farxiga        Follow Up:   No follow-ups on file.      Thank you for allowing me to participate in the care of your patient. Please to not hesitate to contact me with additional questions or concerns.     GISEL Johnson MD  Interventional Cardiology   04/02/2021  11:23 EDT

## 2021-04-05 PROBLEM — I25.10 CORONARY ARTERY DISEASE INVOLVING NATIVE CORONARY ARTERY OF NATIVE HEART WITHOUT ANGINA PECTORIS: Status: ACTIVE | Noted: 2021-03-09

## 2021-04-06 ENCOUNTER — READMISSION MANAGEMENT (OUTPATIENT)
Dept: CALL CENTER | Facility: HOSPITAL | Age: 61
End: 2021-04-06

## 2021-04-06 NOTE — OUTREACH NOTE
CT Surgery Week 3 Survey      Responses   [facility] patient discharged from?  [place]   Does the patient have one of the following disease processes/diagnoses(primary or secondary)?  Cardiothoracic surgery   Week 3 attempt successful?  Yes   Call start time  1400   Call end time  1404   General alerts for this patient  Please call Claudia as she will have info on pt   Discharge diagnosis  Unstable angina coronary artery bypass grafting   Person spoke with today (if not patient) and relationship  Wife   Meds reviewed with patient/caregiver?  Yes   Is the patient having any side effects they believe may be caused by any medication additions or changes?  No   Is the patient taking all medications as directed (includes completed medication regime)?  Yes   Does the patient have a primary care provider?   Yes   Has the patient kept scheduled appointments due by today?  Yes   Psychosocial issues?  No   Comments  Pt has small amt of chest pressure on occas, wife reports. Pt does reports SOA w/ exertion, still having trouble using IS. Only able to get that to 750ml.Advised to speak w/MD r/t this issue. Encouraged to cont using IS. Pt is monitoring FSBG.    What is the patient's perception of their health status since discharge?  Improving   Is the patient /caregiver able to teach back basic post-op care?  Do not remove steri-strips, No tub bath, swimming, or hot tub until instructed by MD, Keep incision areas clean, dry and protected   Is the patient/caregiver able to teach back signs and symptoms of incisional infection?  Increased drainage or bleeding   Is the patient/caregiver able to teach back steps to recovery at home?  Practice good oral hygiene, Eat a well-balance diet   Is the patient/caregiver able to teach back the hierarchy of who to call/visit for symptoms/problems? PCP, Specialist, Home health nurse, Urgent Care, ED, 911  Yes   Week 3 call completed?  Yes          Addis Montanez RN

## 2021-04-13 ENCOUNTER — READMISSION MANAGEMENT (OUTPATIENT)
Dept: CALL CENTER | Facility: HOSPITAL | Age: 61
End: 2021-04-13

## 2021-04-13 ENCOUNTER — OFFICE VISIT (OUTPATIENT)
Dept: CARDIAC SURGERY | Facility: CLINIC | Age: 61
End: 2021-04-13

## 2021-04-13 VITALS
HEART RATE: 75 BPM | BODY MASS INDEX: 32.34 KG/M2 | OXYGEN SATURATION: 98 % | WEIGHT: 231 LBS | TEMPERATURE: 98 F | SYSTOLIC BLOOD PRESSURE: 124 MMHG | DIASTOLIC BLOOD PRESSURE: 77 MMHG | HEIGHT: 71 IN

## 2021-04-13 DIAGNOSIS — Z95.1 S/P CABG (CORONARY ARTERY BYPASS GRAFT): Primary | ICD-10-CM

## 2021-04-13 DIAGNOSIS — I25.10 CORONARY ARTERY DISEASE INVOLVING NATIVE CORONARY ARTERY OF NATIVE HEART WITHOUT ANGINA PECTORIS: ICD-10-CM

## 2021-04-13 PROCEDURE — 71046 X-RAY EXAM CHEST 2 VIEWS: CPT | Performed by: NURSE PRACTITIONER

## 2021-04-13 PROCEDURE — 99024 POSTOP FOLLOW-UP VISIT: CPT | Performed by: NURSE PRACTITIONER

## 2021-04-13 NOTE — OUTREACH NOTE
CT Surgery Week 4 Survey      Responses   [facility] patient discharged from?  [place]   Does the patient have one of the following disease processes/diagnoses(primary or secondary)?  Cardiothoracic surgery   Week 4 attempt successful?  Yes   Call start time  1726   Call end time  1728   Discharge diagnosis  Unstable angina coronary artery bypass grafting   Is patient permission given to speak with other caregiver?  Yes   List who call center can speak with  significant other- Claudia   Person spoke with today (if not patient) and relationship  significant other- Claudia   Is the patient taking all medications as directed (includes completed medication regime)?  Yes   Has the patient kept scheduled appointments due by today?  Yes   What is the patient's perception of their health status since discharge?  Improving   Is the patient/caregiver able to teach back the hierarchy of who to call/visit for symptoms/problems? PCP, Specialist, Home health nurse, Urgent Care, ED, 911  Yes   Week 4 Call Completed?  Yes   Would the patient like one additional call?  No   Graduated  Yes   Is the patient interested in additional calls from an ambulatory ?  NOTE:  applies to high risk patients requiring additional follow-up.  No   Did the patient feel the follow up calls were helpful during their recovery period?  Yes   Was the number of calls appropriate?  Yes   Wrap up additional comments  Claudia states patient is back in [place] and is doing well, no questions or concerns at this time.          Sofia Aviles RN

## 2021-04-13 NOTE — PROGRESS NOTES
"     [facility] Cardiothoracic Surgery Office Follow Up Note     Date of Encounter: 04/13/2021     MRN Number: [id number]  Name: Sony Marks  Phone Number: [phone]     Referred By: No ref. provider found  PCP: Johan Tse MD    Chief Complaint:    Chief Complaint   Patient presents with   • Post-op     Hospital follow up s/p CABG 3/11/21,complains of chest and right side back pain when he takes a breath.   • Coronary Artery Disease       History of Present Illness:    Sony Marks is a 60 y.o. male with a history of hypertension, hyperlipidemia, diabetes, ischemic cardiomyopathy (EF 40%), coronary artery disease status post CABG x3 with right EVH on 3/11/2021 with Dr. Hamm.  Patient experienced postoperative atrial fibrillation and was discharged on 3/16.  He presents today in clinic for postop follow-up.  He reports he was doing \"excellent\" postoperatively, has been very active and with pain  up until Friday when he woke up from a nap with a gurgling sensation and worsening right-sided chest pain rated 8/10.  He has not had any shortness of air or productive cough.  Pain is worse with inspiration and sitting in certain positions.  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  He has already had a follow-up appointment with cardiology Dr. Johnson and plan for close follow-up with PCP regarding new diagnosis of diabetes.    Review of Systems:  Review of Systems   Constitutional: Positive for malaise/fatigue. Negative for chills, decreased appetite, diaphoresis, fever, night sweats, weight gain and weight loss.   HENT: Negative.  Negative for hoarse voice.    Eyes: Negative for blurred vision, double vision and visual disturbance.   Cardiovascular: Positive for dyspnea on exertion. Negative for chest pain, claudication, irregular heartbeat, leg swelling, near-syncope, orthopnea, " palpitations, paroxysmal nocturnal dyspnea and syncope.   Respiratory: Negative.  Negative for cough, hemoptysis, shortness of breath, sputum production and wheezing.    Hematologic/Lymphatic: Negative.  Negative for adenopathy and bleeding problem. Does not bruise/bleed easily.   Skin: Negative.  Negative for color change, nail changes, poor wound healing and rash.   Musculoskeletal: Positive for back pain. Negative for falls and muscle cramps.   Gastrointestinal: Positive for constipation. Negative for abdominal pain, dysphagia and heartburn.   Genitourinary: Negative for flank pain.   Neurological: Negative for brief paralysis, disturbances in coordination, focal weakness, headaches, light-headedness, loss of balance, numbness, paresthesias, sensory change, vertigo and weakness.   Psychiatric/Behavioral: Negative for depression and suicidal ideas. The patient has insomnia.    Allergic/Immunologic: Negative for persistent infections.       I have reviewed the following portions of the patient's history: allergies, current medications, past family history, past medical history, past social history, past surgical history and problem list and confirm it's accurate.    Allergies:  No Known Allergies    Medications:      Current Outpatient Medications:   •  aspirin 81 MG EC tablet, Take 1 tablet by mouth Daily., Disp: 180 tablet, Rfl: 3  •  atorvastatin (LIPITOR) 80 MG tablet, Take 1 tablet by mouth Daily., Disp: 90 tablet, Rfl: 3  •  carvedilol (COREG) 6.25 MG tablet, Take 1 tablet by mouth 2 (Two) Times a Day With Meals., Disp: 180 tablet, Rfl: 0  •  Dapagliflozin Propanediol (Farxiga) 10 MG tablet, Take 10 mg by mouth Daily., Disp: 90 tablet, Rfl: 0  •  HYDROcodone-acetaminophen (NORCO)  MG per tablet, Take 1 tablet by mouth Every 8 (Eight) Hours As Needed for Moderate Pain ., Disp: 15 tablet, Rfl: 0  •  losartan (COZAAR) 25 MG tablet, Take 0.5 tablets by mouth Daily., Disp: 90 tablet, Rfl: 0  •  nitroglycerin  (NITROSTAT) 0.4 MG SL tablet, 1 under the tongue as needed for angina, may repeat q5mins for up three doses (Patient taking differently: Place 0.4 mg under the tongue Every 5 (Five) Minutes As Needed. 1 under the tongue as needed for angina, may repeat q5mins for up three doses), Disp: 30 tablet, Rfl: 3  No current facility-administered medications for this visit.    Facility-Administered Medications Ordered in Other Visits:   •  Chlorhexidine Gluconate Cloth 2 % pads 1 application, 1 application, Topical, Q12H PRN, Toma Alba PA-C    History:   Past Medical History:   Diagnosis Date   • Coronary artery disease    • Heart attack (CMS/[facility]) 01/2011   • Hyperlipidemia    • Hypertension    • Type 2 diabetes mellitus (CMS/[facility])        Past Surgical History:   Procedure Laterality Date   • CARDIAC CATHETERIZATION     • CARDIAC CATHETERIZATION N/A 3/8/2021    Procedure: Coronary angiography;  Surgeon: Sree Johnson MD;  Location:  [facility] CATH INVASIVE LOCATION;  Service: Cardiology;  Laterality: N/A;   • CARDIAC CATHETERIZATION N/A 3/8/2021    Procedure: Left Heart Cath;  Surgeon: Sree Johnson MD;  Location: [facility] CATH INVASIVE LOCATION;  Service: Cardiology;  Laterality: N/A;   • COLONOSCOPY  1990   • CORONARY ANGIOPLASTY WITH STENT PLACEMENT  01/2011   • CORONARY ARTERY BYPASS GRAFT N/A 3/11/2021    Procedure: MEDIAN STERNOTOMY, CORONARY ARTERY BYPASS GRAFTING x3, UTILIZATION OF LEFT INTERAL MAMMARY, EVH OF RIGHT GREATER SAPHENOUS VEIN, GERMAN PER ANESTHESIA;  Surgeon: Panfilo Hamm MD;  Location[facility];  Service: Cardiothoracic;  Laterality: N/A;  VO: 0845  VR: 0920          Family History   Problem Relation Age of Onset   • Alzheimer's disease Mother    • Bone cancer Mother    • Coronary artery disease Father    • Heart attack Father    • Diabetes Father    • Hypertension Father    • Lung disease Father    • Pulmonary fibrosis Father    • No Known Problems Sister    • Heart attack Paternal  "Grandmother    • Heart disease Paternal Grandmother    • Alzheimer's disease Paternal Grandmother    • Hypertension Son    • Seizures Son        Social History     Socioeconomic History   • Marital status: Single     Spouse name: Not on file   • Number of children: 2   • Years of education: Not on file   • Highest education level: Not on file   Tobacco Use   • Smoking status: Former Smoker     Years: 7.00     Types: Cigars   • Smokeless tobacco: Never Used   • Tobacco comment: smoked 3 cigars a week;    Vaping Use   • Vaping Use: Never used   Substance and Sexual Activity   • Alcohol use: Yes     Comment: rarely    • Drug use: Not Currently     Types: Marijuana     Comment: last use of marijuana Dec 2020 ; rarely    • Sexual activity: Defer       Physical Exam:  Vitals:    04/13/21 1125   BP: 124/77   BP Location: Right arm   Patient Position: Sitting   Pulse: 75   Temp: 98 °F (36.7 °C)   SpO2: 98%   Weight: 105 kg (231 lb)   Height: 180.3 cm (71\")      Body mass index is 32.22 kg/m².    Physical Exam  Vitals and nursing note reviewed.   Constitutional:       Appearance: Normal appearance.   HENT:      Head: Normocephalic and atraumatic.   Eyes:      Pupils: Pupils are equal, round, and reactive to light.   Neck:      Vascular: No carotid bruit.   Cardiovascular:      Rate and Rhythm: Normal rate and regular rhythm.      Pulses: Normal pulses.      Heart sounds: Normal heart sounds, S1 normal and S2 normal. No murmur heard.     Pulmonary:      Effort: Pulmonary effort is normal.      Breath sounds: Normal breath sounds.   Abdominal:      Palpations: Abdomen is soft.   Musculoskeletal:         General: Normal range of motion.      Cervical back: Normal range of motion and neck supple.      Right lower leg: No edema.      Left lower leg: No edema.   Skin:     General: Skin is warm and dry.      Capillary Refill: Capillary refill takes less than 2 seconds.      Comments: Mid-sternotomy incision: wound edges well " approximated, no erythema, edema, or induration  Mediastinal tubes sites: well healing without erythema, edema, or drainage  Endoscopic vein harvest site: well healing without erythema, edema, or drainage   Neurological:      General: No focal deficit present.      Mental Status: He is alert and oriented to person, place, and time. Mental status is at baseline.      GCS: GCS eye subscore is 4. GCS verbal subscore is 5. GCS motor subscore is 6.      Motor: Motor function is intact.      Coordination: Coordination is intact.      Gait: Gait is intact.   Psychiatric:         Mood and Affect: Mood normal.         Speech: Speech normal.         Behavior: Behavior normal. Behavior is cooperative.         Cognition and Memory: Cognition normal.         Labs/Imaging:    Chest x-ray in office today: Lungs fully expanded and well-inflated. No signs of pneumothorax, pleural effusion, or acute airspace consolidation noted. Sternal wires intact. Cardiomediastinal structures appear within normal limits. Personally reviewed.  Official radiology report pending    XR Chest 2 View    Result Date: 4/13/2021  Essentially resolved postoperative appearance with no evidence of current acute disease in the chest.  This report was finalized on 4/13/2021 1:35 PM by Kristopher Fried.      Ultrasound of carotid bilateral 3/10/2021:  Mild plaque within the carotid bulbs with no hemodynamically significant  stenosis.    Assessment / Plan:  Diagnoses and all orders for this visit:    1. S/P CABG (coronary artery bypass graft) (Primary)    2. Coronary artery disease involving native coronary artery of native heart without angina pectoris     Sony Marks is a 60 y.o. male with a history of hypertension, hyperlipidemia, diabetes, ischemic cardiomyopathy (EF 40%), coronary artery disease status post CABG x3 with right EVH on 3/11/2021 with Dr. Hamm.  Incisions well-healing with 3 sutures removed from MTS sites.  In-house films have no  evidence of fluid overload.  Encourage patient to continue to use his incentive spirometer and increase pulmonary toilet.  Recommend treating increase in pain as suspected pleurisy with NSAIDs, Tylenol, and heating pad.  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  Activity restrictions reiterated during our visit. Pt is followed by cariologist Dr Johnson and can see us on an as needed basis.    Dr. Hamm at bedside for eval and recs.        Follow Up: PRN    Please return to clinic for any concerns or worsening signs and symptoms. If unable to reach us in the office please report to your local emergency department or dial 911.     Cheryl Palm APRGUERLINE  [facility] Cardiothoracic Surgery    Answers for HPI/ROS submitted by the patient on 4/12/2021  What is the primary reason for your visit?: Other  Please describe your symptoms.: Heart  Have you had these symptoms before?: Yes  How long have you been having these symptoms?: 1-2 weeks  Please list any medications you are currently taking for this condition.: On file

## 2021-04-22 LAB
QT INTERVAL: 356 MS
QTC INTERVAL: 405 MS

## 2021-06-09 LAB
QT INTERVAL: 316 MS
QT INTERVAL: 344 MS
QT INTERVAL: 376 MS
QTC INTERVAL: 406 MS
QTC INTERVAL: 427 MS
QTC INTERVAL: 439 MS

## 2021-06-17 DIAGNOSIS — E11.9 TYPE 2 DIABETES MELLITUS WITHOUT COMPLICATION, WITHOUT LONG-TERM CURRENT USE OF INSULIN (HCC): ICD-10-CM

## 2021-06-17 DIAGNOSIS — I25.5 ISCHEMIC CARDIOMYOPATHY: ICD-10-CM

## 2021-06-17 RX ORDER — DAPAGLIFLOZIN 10 MG/1
1 TABLET, FILM COATED ORAL DAILY
Qty: 90 TABLET | Refills: 3 | Status: SHIPPED | OUTPATIENT
Start: 2021-06-17 | End: 2022-06-21 | Stop reason: SDUPTHER

## 2021-07-06 ENCOUNTER — OFFICE VISIT (OUTPATIENT)
Dept: CARDIOLOGY | Facility: CLINIC | Age: 61
End: 2021-07-06

## 2021-07-06 VITALS
SYSTOLIC BLOOD PRESSURE: 124 MMHG | RESPIRATION RATE: 20 BRPM | HEART RATE: 74 BPM | BODY MASS INDEX: 33.12 KG/M2 | DIASTOLIC BLOOD PRESSURE: 82 MMHG | WEIGHT: 236.6 LBS | OXYGEN SATURATION: 98 % | HEIGHT: 71 IN

## 2021-07-06 DIAGNOSIS — I48.91 ATRIAL FIBRILLATION WITH RVR (HCC): ICD-10-CM

## 2021-07-06 DIAGNOSIS — Z95.1 S/P CABG (CORONARY ARTERY BYPASS GRAFT): ICD-10-CM

## 2021-07-06 DIAGNOSIS — I25.5 ISCHEMIC CARDIOMYOPATHY: ICD-10-CM

## 2021-07-06 DIAGNOSIS — I10 ESSENTIAL HYPERTENSION: ICD-10-CM

## 2021-07-06 DIAGNOSIS — I25.10 CORONARY ARTERY DISEASE INVOLVING NATIVE CORONARY ARTERY OF NATIVE HEART WITHOUT ANGINA PECTORIS: Primary | ICD-10-CM

## 2021-07-06 PROCEDURE — 99214 OFFICE O/P EST MOD 30 MIN: CPT | Performed by: INTERNAL MEDICINE

## 2021-07-06 RX ORDER — ISOSORBIDE MONONITRATE 60 MG/1
TABLET, EXTENDED RELEASE ORAL AS NEEDED
COMMUNITY
Start: 2021-05-10 | End: 2021-07-06 | Stop reason: ALTCHOICE

## 2021-07-06 NOTE — PROGRESS NOTES
[facility] Cardiology Office Follow Up Note    Sony Marks  [id number]  2021    Primary Care Provider: Johan Tse MD    Chief Complaint: Regular follow-up    History of Present Illness:   Mr. Sony Marks is a 60 y.o. male who presents to the Cardiology Clinic for regular follow-up.  The patient has a past medical history significant for hypertension and prior tobacco use.  He has a past cardiac history significant for an anterior STEMI in .  At that time, he reportedly underwent thrombolytic therapy and subsequent thrombectomy with PCI and placement of a 3.0 x 28 mm stent in the LAD.  Most recently, he underwent coronary angiography in  for unstable angina was found to have severe multivessel disease.  He subcentimeter 3V CABG with a LIMA-LAD, SVG-RCA, and SVG-OM1.  He presents the cardiology clinic today for regular follow-up.  The patient reports he is generally doing well postoperatively.  He continues to have anterior neck discomfort as well as mild sternal discomfort related to his sternotomy.  He has not had any recurrent episodes of chest pain or anginal symptoms.  He notes overall improvement in his energy level after undergoing CABG.  He continues to tolerate his cardiac medications without difficulty.  No other specific complaints at this time.    Past Cardiac Testin. Last Coronary Angio: 3/8/2021              1.  Severe multivessel coronary artery disease including:                            -Patent stent in the proximal LAD with severe stenosis in the mid LAD at the outflow of the prior stent                            -Severe stenosis in the proximal portion of a large OM1, with a widely patent stent in the mid OM1                            -Severe stenosis in the proximal RCA              2.  Moderately elevated LVEDP, 25 mmHg.  2. Prior Stress Testing: None  3. Last Echo: 3/8/2021              1.  Normal left ventricular size  with mildly reduced LV systolic function, LVEF approximately 40%.              2.  Hypokinesis of the mid anterior wall and apex.              3.  Grade 1 diastolic dysfunction.              4.  Normal right ventricular size and systolic function.              5.  Normal left atrial volume index.              6.  Mild calcification aortic valve without significant stenosis.  4. Prior Holter Monitor: None    Review of Systems:   Review of Systems   Constitutional: Negative for activity change, appetite change, chills, diaphoresis, fatigue, fever, unexpected weight gain and unexpected weight loss.   Eyes: Negative for blurred vision and double vision.   Respiratory: Negative for cough, chest tightness, shortness of breath and wheezing.    Cardiovascular: Positive for chest pain. Negative for palpitations and leg swelling.   Gastrointestinal: Negative for abdominal pain, anal bleeding, blood in stool and GERD.   Endocrine: Negative for cold intolerance and heat intolerance.   Genitourinary: Negative for hematuria.   Neurological: Negative for dizziness, syncope, weakness and light-headedness.   Hematological: Does not bruise/bleed easily.   Psychiatric/Behavioral: Negative for depressed mood and stress. The patient is not nervous/anxious.        I have reviewed and/or updated the patient's past medical, past surgical, family, social history, problem list and allergies as appropriate.     Medications:     Current Outpatient Medications:   •  aspirin 81 MG EC tablet, Take 1 tablet by mouth Daily., Disp: 180 tablet, Rfl: 3  •  atorvastatin (LIPITOR) 80 MG tablet, Take 1 tablet by mouth Daily., Disp: 90 tablet, Rfl: 3  •  carvedilol (COREG) 6.25 MG tablet, Take 1 tablet by mouth 2 (Two) Times a Day With Meals., Disp: 180 tablet, Rfl: 0  •  Dapagliflozin Propanediol (Farxiga) 10 MG tablet, Take 10 mg by mouth Daily., Disp: 90 tablet, Rfl: 3  •  losartan (COZAAR) 25 MG tablet, Take 0.5 tablets by mouth Daily., Disp: 90  "tablet, Rfl: 0  •  nitroglycerin (NITROSTAT) 0.4 MG SL tablet, 1 under the tongue as needed for angina, may repeat q5mins for up three doses (Patient taking differently: Place 0.4 mg under the tongue Every 5 (Five) Minutes As Needed. 1 under the tongue as needed for angina, may repeat q5mins for up three doses), Disp: 30 tablet, Rfl: 3  No current facility-administered medications for this visit.    Facility-Administered Medications Ordered in Other Visits:   •  Chlorhexidine Gluconate Cloth 2 % pads 1 application, 1 application, Topical, Q12H PRN, Toma Alba PA-C    Physical Exam:  Vital Signs:   Vitals:    07/06/21 1407   BP: 124/82   BP Location: Right arm   Patient Position: Sitting   Cuff Size: Large Adult   Pulse: 74   Resp: 20   SpO2: 98%   Weight: 107 kg (236 lb 9.6 oz)   Height: 180.3 cm (70.98\")       Physical Exam  Constitutional:       General: He is not in acute distress.     Appearance: Normal appearance. He is well-developed. He is not diaphoretic.   HENT:      Head: Normocephalic and atraumatic.   Eyes:      General: No scleral icterus.     Pupils: Pupils are equal, round, and reactive to light.   Neck:      Trachea: No tracheal deviation.   Cardiovascular:      Rate and Rhythm: Normal rate and regular rhythm.      Heart sounds: Normal heart sounds. No murmur heard.   No friction rub. No gallop.       Comments: Normal JVD.  Pulmonary:      Effort: Pulmonary effort is normal. No respiratory distress.      Breath sounds: Normal breath sounds. No stridor. No wheezing or rales.   Chest:      Chest wall: No tenderness.   Abdominal:      General: Bowel sounds are normal. There is no distension.      Palpations: Abdomen is soft.      Tenderness: There is no abdominal tenderness. There is no guarding or rebound.   Musculoskeletal:         General: No swelling. Normal range of motion.      Cervical back: Neck supple. No tenderness.   Lymphadenopathy:      Cervical: No cervical adenopathy.   Skin:     " General: Skin is warm and dry.      Findings: No erythema.      Comments: Sternotomy scar is well-healed   Neurological:      General: No focal deficit present.      Mental Status: He is alert and oriented to person, place, and time.   Psychiatric:         Mood and Affect: Mood normal.         Behavior: Behavior normal.         Results Review:   I reviewed the patient's new clinical results.        Assessment / Plan:     1.    Multivessel coronary artery disease  --History of anterior MI in 2011 with PCI to the LAD, subsequent 3V CABG in 3/21  --Continues to do well postoperatively without recurrent anginal symptoms  --Continue aspirin, carvedilol, and high intensity statin  --Follow-up in 6 months, or sooner if required     2.  Postoperative atrial fibrillation  --Limited episode of postoperative atrial fibrillation  --In sinus rhythm today  --Defer further monitoring at this time, low threshold for outpatient cardiac monitoring if recurrent symptoms to suggest PAF     3.   Ischemic cardiomyopathy  --LVEF 40% on echocardiogram in 3/21  --Remains well compensated and euvolemic on exam today, NYHA class I  --Continue carvedilol and losartan     4.  Hypertension  --Remains well controlled with current antihypertensives     5. Hyperlipidemia  --On high intensity statin     6.  Type 2 diabetes mellitus  --On Farxiga    Follow Up:   Return in about 6 months (around 1/6/2022).      Thank you for allowing me to participate in the care of your patient. Please to not hesitate to contact me with additional questions or concerns.     GISEL Johnson MD  Interventional Cardiology   07/06/2021  14:14 EDT

## 2021-07-12 ENCOUNTER — APPOINTMENT (OUTPATIENT)
Dept: GENERAL RADIOLOGY | Facility: HOSPITAL | Age: 61
End: 2021-07-12

## 2021-07-12 ENCOUNTER — HOSPITAL ENCOUNTER (EMERGENCY)
Facility: HOSPITAL | Age: 61
Discharge: HOME | End: 2021-07-12
Attending: EMERGENCY MEDICINE | Admitting: EMERGENCY MEDICINE

## 2021-07-12 VITALS
RESPIRATION RATE: 16 BRPM | HEIGHT: 71 IN | DIASTOLIC BLOOD PRESSURE: 87 MMHG | BODY MASS INDEX: 34.13 KG/M2 | OXYGEN SATURATION: 96 % | SYSTOLIC BLOOD PRESSURE: 133 MMHG | WEIGHT: 243.8 LBS | TEMPERATURE: 99 F | HEART RATE: 66 BPM

## 2021-07-12 DIAGNOSIS — R07.81 RIB PAIN ON LEFT SIDE: Primary | ICD-10-CM

## 2021-07-12 LAB
ALBUMIN SERPL-MCNC: 4.1 G/DL (ref 3.5–5.2)
ALBUMIN/GLOB SERPL: 1.3 G/DL
ALP SERPL-CCNC: 84 U/L (ref 39–117)
ALT SERPL W P-5'-P-CCNC: 13 U/L (ref 1–41)
ANION GAP SERPL CALCULATED.3IONS-SCNC: 11.7 MMOL/L (ref 5–15)
AST SERPL-CCNC: 20 U/L (ref 1–40)
BASOPHILS # BLD AUTO: 0.08 10*3/MM3 (ref 0–0.2)
BASOPHILS NFR BLD AUTO: 0.7 % (ref 0–1.5)
BILIRUB SERPL-MCNC: 0.4 MG/DL (ref 0–1.2)
BUN SERPL-MCNC: 12 MG/DL (ref 6–20)
BUN/CREAT SERPL: 14.3 (ref 7–25)
CALCIUM SPEC-SCNC: 9.3 MG/DL (ref 8.6–10.5)
CHLORIDE SERPL-SCNC: 103 MMOL/L (ref 98–107)
CO2 SERPL-SCNC: 24.3 MMOL/L (ref 22–29)
CREAT SERPL-MCNC: 0.84 MG/DL (ref 0.76–1.27)
DEPRECATED RDW RBC AUTO: 45.2 FL (ref 37–54)
EOSINOPHIL # BLD AUTO: 0.18 10*3/MM3 (ref 0–0.4)
EOSINOPHIL NFR BLD AUTO: 1.5 % (ref 0.3–6.2)
ERYTHROCYTE [DISTWIDTH] IN BLOOD BY AUTOMATED COUNT: 13.6 % (ref 12.3–15.4)
GFR SERPL CREATININE-BSD FRML MDRD: 94 ML/MIN/1.73
GLOBULIN UR ELPH-MCNC: 3.2 GM/DL
GLUCOSE SERPL-MCNC: 149 MG/DL (ref 65–99)
HCT VFR BLD AUTO: 48.1 % (ref 37.5–51)
HGB BLD-MCNC: 15.4 G/DL (ref 13–17.7)
IMM GRANULOCYTES # BLD AUTO: 0.06 10*3/MM3 (ref 0–0.05)
IMM GRANULOCYTES NFR BLD AUTO: 0.5 % (ref 0–0.5)
LYMPHOCYTES # BLD AUTO: 2.19 10*3/MM3 (ref 0.7–3.1)
LYMPHOCYTES NFR BLD AUTO: 18.5 % (ref 19.6–45.3)
MCH RBC QN AUTO: 28.8 PG (ref 26.6–33)
MCHC RBC AUTO-ENTMCNC: 32 G/DL (ref 31.5–35.7)
MCV RBC AUTO: 90.1 FL (ref 79–97)
MONOCYTES # BLD AUTO: 0.95 10*3/MM3 (ref 0.1–0.9)
MONOCYTES NFR BLD AUTO: 8 % (ref 5–12)
NEUTROPHILS NFR BLD AUTO: 70.8 % (ref 42.7–76)
NEUTROPHILS NFR BLD AUTO: 8.35 10*3/MM3 (ref 1.7–7)
NRBC BLD AUTO-RTO: 0 /100 WBC (ref 0–0.2)
PLATELET # BLD AUTO: 304 10*3/MM3 (ref 140–450)
PMV BLD AUTO: 9.7 FL (ref 6–12)
POTASSIUM SERPL-SCNC: 4.4 MMOL/L (ref 3.5–5.2)
PROT SERPL-MCNC: 7.3 G/DL (ref 6–8.5)
RBC # BLD AUTO: 5.34 10*6/MM3 (ref 4.14–5.8)
SODIUM SERPL-SCNC: 139 MMOL/L (ref 136–145)
TROPONIN T SERPL-MCNC: <0.01 NG/ML (ref 0–0.03)
WBC # BLD AUTO: 11.81 10*3/MM3 (ref 3.4–10.8)

## 2021-07-12 PROCEDURE — 80053 COMPREHEN METABOLIC PANEL: CPT | Performed by: EMERGENCY MEDICINE

## 2021-07-12 PROCEDURE — 96374 THER/PROPH/DIAG INJ IV PUSH: CPT

## 2021-07-12 PROCEDURE — 25010000002 MORPHINE PER 10 MG: Performed by: EMERGENCY MEDICINE

## 2021-07-12 PROCEDURE — 85025 COMPLETE CBC W/AUTO DIFF WBC: CPT | Performed by: EMERGENCY MEDICINE

## 2021-07-12 PROCEDURE — 72040 X-RAY EXAM NECK SPINE 2-3 VW: CPT

## 2021-07-12 PROCEDURE — 71101 X-RAY EXAM UNILAT RIBS/CHEST: CPT

## 2021-07-12 PROCEDURE — 99283 EMERGENCY DEPT VISIT LOW MDM: CPT

## 2021-07-12 PROCEDURE — 84484 ASSAY OF TROPONIN QUANT: CPT | Performed by: EMERGENCY MEDICINE

## 2021-07-12 PROCEDURE — 93005 ELECTROCARDIOGRAM TRACING: CPT | Performed by: EMERGENCY MEDICINE

## 2021-07-12 RX ORDER — MORPHINE SULFATE 4 MG/ML
4 INJECTION, SOLUTION INTRAMUSCULAR; INTRAVENOUS ONCE
Status: COMPLETED | OUTPATIENT
Start: 2021-07-12 | End: 2021-07-12

## 2021-07-12 RX ORDER — DIAZEPAM 5 MG/1
5 TABLET ORAL EVERY 6 HOURS PRN
Qty: 12 TABLET | Refills: 0 | Status: SHIPPED | OUTPATIENT
Start: 2021-07-12 | End: 2021-10-21

## 2021-07-12 RX ADMIN — MORPHINE SULFATE 4 MG: 4 INJECTION, SOLUTION INTRAMUSCULAR; INTRAVENOUS at 08:20

## 2021-07-12 NOTE — ED PROVIDER NOTES
Subjective   60-year-old male presenting with rib pain.  He states that for the last 3 days he has had severe pain in his left lower lateral ribs, this is a sharp pain, he also has pain in his upper chest and neck.  It is unclear how acute or chronic this is.  Symptoms are made worse by certain movements and deep breath.  He does not feel short of breath.  Has had no fevers or cough.  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  Patient does have a history of coronary disease, had CABG about 4 months ago.  Thinks that the pain in his upper chest has been present since then.  Saw his cardiologist a couple days prior to symptoms starting and had a reassuring checkup.          Review of Systems   Constitutional: Negative.    HENT: Negative.    Eyes: Negative.    Respiratory: Negative.    Cardiovascular: Positive for chest pain.   Gastrointestinal: Negative.    Genitourinary: Negative.    Musculoskeletal: Positive for arthralgias.   Skin: Negative.    Neurological: Negative.    Psychiatric/Behavioral: Negative.        Past Medical History:   Diagnosis Date   • Coronary artery disease    • Heart attack (CMS/HCC) 01/2011   • Hyperlipidemia    • Hypertension    • Type 2 diabetes mellitus (CMS/[facility])        No Known Allergies    Past Surgical History:   Procedure Laterality Date   • CARDIAC CATHETERIZATION     • CARDIAC CATHETERIZATION N/A 3/8/2021    Procedure: Coronary angiography;  Surgeon: Sree Johnson MD;  Location: [facility] CATH INVASIVE LOCATION;  Service: Cardiology;  Laterality: N/A;   • CARDIAC CATHETERIZATION N/A 3/8/2021    Procedure: Left Heart Cath;  Surgeon: Sree Johnson MD;  Location: [facility] CATH INVASIVE LOCATION;  Service: Cardiology;  Laterality: N/A;   • COLONOSCOPY  1990   • CORONARY ANGIOPLASTY WITH STENT PLACEMENT  01/2011   • CORONARY ARTERY BYPASS GRAFT N/A 3/11/2021    Procedure: MEDIAN STERNOTOMY, CORONARY  ARTERY BYPASS GRAFTING x3, UTILIZATION OF LEFT INTERAL MAMMARY, EVH OF RIGHT GREATER SAPHENOUS VEIN, GERMAN PER ANESTHESIA;  Surgeon: Panfilo Hamm MD;  Location: [facility];  Service: Cardiothoracic;  Laterality: N/A;  VO: 0845  VR: 0920         Family History   Problem Relation Age of Onset   • Alzheimer's disease Mother    • Bone cancer Mother    • Coronary artery disease Father    • Heart attack Father    • Diabetes Father    • Hypertension Father    • Lung disease Father    • Pulmonary fibrosis Father    • No Known Problems Sister    • Heart attack Paternal Grandmother    • Heart disease Paternal Grandmother    • Alzheimer's disease Paternal Grandmother    • Hypertension Son    • Seizures Son        Social History     Socioeconomic History   • Marital status: Single     Spouse name: Not on file   • Number of children: 2   • Years of education: Not on file   • Highest education level: Not on file   Tobacco Use   • Smoking status: Former Smoker     Years: 7.00     Types: Cigars   • Smokeless tobacco: Never Used   • Tobacco comment: smoked 3 cigars a week;    Vaping Use   • Vaping Use: Never used   Substance and Sexual Activity   • Alcohol use: Yes     Comment: rarely    • Drug use: Not Currently     Types: Marijuana     Comment: last use of marijuana Dec 2020 ; rarely    • Sexual activity: Defer           Objective   Physical Exam  Vitals reviewed.   Constitutional:       General: He is not in acute distress.     Appearance: Normal appearance. He is not ill-appearing, toxic-appearing or diaphoretic.   HENT:      Head: Normocephalic and atraumatic.      Right Ear: External ear normal.      Left Ear: External ear normal.      Nose: Nose normal.      Mouth/Throat:      Mouth: Mucous membranes are moist.      Pharynx: Oropharynx is clear.   Eyes:      Extraocular Movements: Extraocular movements intact.      Conjunctiva/sclera: Conjunctivae normal.      Pupils: Pupils are equal, round, and reactive to light.    Cardiovascular:      Rate and Rhythm: Normal rate and regular rhythm.      Pulses: Normal pulses.      Heart sounds: Normal heart sounds.   Pulmonary:      Effort: Pulmonary effort is normal. No respiratory distress.      Breath sounds: Normal breath sounds.   Abdominal:      General: Bowel sounds are normal. There is no distension.      Tenderness: There is no abdominal tenderness.   Musculoskeletal:         General: No swelling or deformity. Normal range of motion.      Cervical back: Normal range of motion and neck supple.      Comments: Tenderness to the left anterior lateral ribs, no crepitus   Skin:     General: Skin is warm and dry.      Capillary Refill: Capillary refill takes less than 2 seconds.      Findings: No rash.   Neurological:      General: No focal deficit present.      Mental Status: He is alert and oriented to person, place, and time.   Psychiatric:         Mood and Affect: Mood normal.         Behavior: Behavior normal.         Procedures           ED Course                                           MDM  Number of Diagnoses or Management Options  Rib pain on left side  Diagnosis management comments: 60-year-old male with chest and rib pain.  Well-developed, well-nourished man in no distress with exam as above.  His vital signs are normal.  His exam is notable for some left-sided tenderness.  Will obtain labs, EKG and chest x-ray.  Will give symptomatic treatment.  Disposition pending.    DDx: ACS, musculoskeletal pain, strain, fracture, anxiety    EKG interpreted by me: Sinus rhythm, normal rate, no acute ST changes, some nonspecific T wave changes, this is an abnormal EKG    Work-up here is overall reassuring.  Pain is well controlled.  Given pain has been present for several days do not feel repeat enzymes are indicated.  I do feel he is safe for discharge home with continued outpatient follow-up.  We discussed return precautions.  Patient and wife are comfortable with and understand  plan.      Final diagnoses:   Rib pain on left side          Hieu Saini MD  07/12/21 5619

## 2021-08-24 ENCOUNTER — TELEPHONE (OUTPATIENT)
Dept: CARDIOLOGY | Facility: CLINIC | Age: 61
End: 2021-08-24

## 2021-08-24 DIAGNOSIS — I25.110 CORONARY ARTERY DISEASE INVOLVING NATIVE CORONARY ARTERY OF NATIVE HEART WITH UNSTABLE ANGINA PECTORIS (HCC): ICD-10-CM

## 2021-08-24 DIAGNOSIS — I25.5 ISCHEMIC CARDIOMYOPATHY: ICD-10-CM

## 2021-08-24 RX ORDER — CARVEDILOL 6.25 MG/1
6.25 TABLET ORAL 2 TIMES DAILY WITH MEALS
Qty: 180 TABLET | Refills: 1 | Status: SHIPPED | OUTPATIENT
Start: 2021-08-24 | End: 2022-03-03

## 2021-08-24 NOTE — TELEPHONE ENCOUNTER
Patient called and is needing Carvedilol refilled. He said  is the one who originally prescribed this medication back in March when he had open heart surgery.      patient   Pharmacy: [facility] in [place]

## 2021-10-21 ENCOUNTER — OFFICE VISIT (OUTPATIENT)
Dept: CARDIOLOGY | Facility: CLINIC | Age: 61
End: 2021-10-21

## 2021-10-21 VITALS
BODY MASS INDEX: 33.32 KG/M2 | RESPIRATION RATE: 18 BRPM | DIASTOLIC BLOOD PRESSURE: 82 MMHG | HEIGHT: 71 IN | HEART RATE: 90 BPM | SYSTOLIC BLOOD PRESSURE: 132 MMHG | OXYGEN SATURATION: 97 % | WEIGHT: 238 LBS

## 2021-10-21 DIAGNOSIS — R53.83 FATIGUE, UNSPECIFIED TYPE: ICD-10-CM

## 2021-10-21 DIAGNOSIS — I10 ESSENTIAL HYPERTENSION: ICD-10-CM

## 2021-10-21 DIAGNOSIS — I48.91 ATRIAL FIBRILLATION WITH RVR (HCC): ICD-10-CM

## 2021-10-21 DIAGNOSIS — I25.10 CORONARY ARTERY DISEASE INVOLVING NATIVE CORONARY ARTERY OF NATIVE HEART WITHOUT ANGINA PECTORIS: Primary | ICD-10-CM

## 2021-10-21 DIAGNOSIS — E78.5 HYPERLIPIDEMIA LDL GOAL <70: Chronic | ICD-10-CM

## 2021-10-21 DIAGNOSIS — L91.0 KELOID SCAR: ICD-10-CM

## 2021-10-21 DIAGNOSIS — I25.5 ISCHEMIC CARDIOMYOPATHY: ICD-10-CM

## 2021-10-21 DIAGNOSIS — R49.9 HOARSENESS OR CHANGING VOICE: ICD-10-CM

## 2021-10-21 PROCEDURE — 93000 ELECTROCARDIOGRAM COMPLETE: CPT | Performed by: NURSE PRACTITIONER

## 2021-10-21 PROCEDURE — 99213 OFFICE O/P EST LOW 20 MIN: CPT | Performed by: NURSE PRACTITIONER

## 2021-10-21 NOTE — PROGRESS NOTES
"             [facility] Cardiology Office Follow Up Note    Sony Marks  [id number]  10/21/2021    Primary Care Provider: Johan Tse MD   Referring Provider: No ref. provider found    Chief Complaint: Chest pain, incisional edema/erythema    History of Present Illness:   Mr. Sony Marks is a 60 y.o. male who presents to the Cardiology Clinic for evaluation of chest pain and incisional edema/erythema.  The patient has a past medical history significant for hypertension and prior tobacco use.  He has a past cardiac history significant for an anterior STEMI in 2011.  At that time, he reportedly underwent thrombolytic therapy and subsequent thrombectomy with PCI and placement of a 3.0 x 28 mm stent in the LAD.  Most recently, he underwent coronary angiography in 2/21 for unstable angina was found to have severe multivessel disease.  He subcentimeter 3V CABG with a LIMA-LAD, SVG-RCA, and SVG-OM1.  He presents the cardiology clinic today for regular follow-up.  The patient presents today with complaints of fullness and discomfort of his neck since his bypass surgery.  He also complains of hoarseness of voice which has been persistent since his bypass as well.  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  He reports ongoing dyspnea with exertion for the past few months.  The patient is also concerned about a portion of his sternotomy incision which he states is red and raised, but non-tender.  He states that this has progressed over the past several months.  The patient reports having experienced sharp \"poking\" sensations in his chest which he believed were sternal wires, but this has been resolved for several months.  He specifically denies chest pain, palpitations,, dizziness, syncope or near syncope, but notes that he may experience lower " extremity edema with prolonged standing or work.  He offers no other complaints or concerns at this time.    Past Cardiac Testin. Last Coronary Angio: 3/8/2021              1.  Severe multivessel coronary artery disease including:                            -Patent stent in the proximal LAD with severe stenosis in the mid LAD at the outflow of the prior stent                            -Severe stenosis in the proximal portion of a large OM1, with a widely patent stent in the mid OM1                            -Severe stenosis in the proximal RCA              2.  Moderately elevated LVEDP, 25 mmHg.  2. Prior Stress Testing: None  3. Last Echo: 3/8/2021              1.  Normal left ventricular size with mildly reduced LV systolic function, LVEF approximately 40%.              2.  Hypokinesis of the mid anterior wall and apex.              3.  Grade 1 diastolic dysfunction.              4.  Normal right ventricular size and systolic function.              5.  Normal left atrial volume index.              6.  Mild calcification aortic valve without significant stenosis.  4. Prior Holter Monitor: None    Review of Systems:   Review of Systems   Constitutional: Positive for fatigue. Negative for activity change, chills, diaphoresis, fever and unexpected weight gain.   Eyes: Negative for blurred vision and visual disturbance.   Respiratory: Positive for shortness of breath. Negative for apnea, cough, chest tightness and wheezing.    Cardiovascular: Negative for chest pain, palpitations and leg swelling.   Gastrointestinal: Negative for abdominal distention, blood in stool, GERD and indigestion.   Endocrine: Negative for cold intolerance and heat intolerance.   Genitourinary: Negative for hematuria.   Musculoskeletal: Negative for gait problem, joint swelling and myalgias.   Skin: Positive for skin lesions. Negative for color change, pallor and bruise.   Neurological: Negative for dizziness, seizures, syncope,  "weakness, light-headedness, numbness, headache and confusion.   Hematological: Does not bruise/bleed easily.   Psychiatric/Behavioral: Negative for behavioral problems, sleep disturbance, suicidal ideas and depressed mood.     I have reviewed and confirmed the accuracy of the ROS as documented by the MA/LPN/RN SHANEL Bailon    I have reviewed and/or updated the patient's past medical, past surgical, family, social history, problem list and allergies as appropriate.     Medications:     Current Outpatient Medications:   •  aspirin 81 MG EC tablet, Take 1 tablet by mouth Daily., Disp: 180 tablet, Rfl: 3  •  atorvastatin (LIPITOR) 80 MG tablet, Take 1 tablet by mouth Daily., Disp: 90 tablet, Rfl: 3  •  carvedilol (COREG) 6.25 MG tablet, Take 1 tablet by mouth 2 (Two) Times a Day With Meals., Disp: 180 tablet, Rfl: 1  •  Dapagliflozin Propanediol (Farxiga) 10 MG tablet, Take 10 mg by mouth Daily., Disp: 90 tablet, Rfl: 3  •  losartan (COZAAR) 25 MG tablet, Take 0.5 tablets by mouth Daily., Disp: 90 tablet, Rfl: 0  •  nitroglycerin (NITROSTAT) 0.4 MG SL tablet, 1 under the tongue as needed for angina, may repeat q5mins for up three doses (Patient taking differently: Place 0.4 mg under the tongue Every 5 (Five) Minutes As Needed. 1 under the tongue as needed for angina, may repeat q5mins for up three doses), Disp: 30 tablet, Rfl: 3  No current facility-administered medications for this visit.    Facility-Administered Medications Ordered in Other Visits:   •  Chlorhexidine Gluconate Cloth 2 % pads 1 application, 1 application, Topical, Q12H PRN, Toma Alba PA-C    Physical Exam:  Vital Signs:   Vitals:    10/21/21 1519   BP: 132/82   BP Location: Left arm   Patient Position: Sitting   Cuff Size: Adult   Pulse: 90   Resp: 18   SpO2: 97%   Weight: 108 kg (238 lb)   Height: 180.3 cm (70.98\")     Body mass index is 33.21 kg/m².    Physical Exam  Vitals and nursing note reviewed.   Constitutional:       " General: He is not in acute distress.     Appearance: Normal appearance. He is well-developed.      Comments: BMI 33.2 kg/m²   HENT:      Head: Normocephalic and atraumatic.   Eyes:      General: No scleral icterus.     Extraocular Movements: Extraocular movements intact.   Neck:      Trachea: Trachea normal.      Comments: Increased adipose tissue and neck circumference  Cardiovascular:      Rate and Rhythm: Normal rate and regular rhythm.      Pulses: Normal pulses.      Heart sounds: Normal heart sounds. No murmur heard.  No friction rub. No gallop.    Pulmonary:      Effort: Pulmonary effort is normal.      Breath sounds: Normal breath sounds.   Abdominal:      Palpations: Abdomen is soft.      Tenderness: There is no abdominal tenderness.   Musculoskeletal:         General: Normal range of motion.      Cervical back: Neck supple.      Right lower leg: No edema.      Left lower leg: No edema.   Skin:     General: Skin is warm and dry.      Findings: No bruising, lesion or rash.      Comments: Approximately 1.25 inch thick, rubbery, dark pink appearance of the distal sternotomy incision.  Nontender to palpation, no surrounding erythema, edema, skin is clean dry intact   Neurological:      Mental Status: He is alert and oriented to person, place, and time.      Motor: No weakness.      Gait: Gait normal.   Psychiatric:         Mood and Affect: Mood normal.         Behavior: Behavior normal. Behavior is cooperative.         Thought Content: Thought content does not include suicidal ideation.         Results Review:   I reviewed the patient's new clinical results.      ECG 12 Lead    Date/Time: 10/21/2021 12:06 PM  Performed by: Catherine Vuong APRN  Authorized by: Catherine Vuong APRN   Comparison: compared with previous ECG from 7/12/2021  Rhythm: sinus rhythm  Rate: normal  BPM: 92  QRS axis: normal    Clinical impression: abnormal EKG  Comments: Possible remote anterior septal infarct  Nonspecific  inferior lateral ST changes            Assessment / Plan:       1. Coronary artery disease involving native coronary artery of native heart without angina pectoris (Primary)  --History of anterior MI in 2011 with PCI to the LAD, subsequent 3V CABG in 3/21  --Denies angina, but reports dyspnea with exertion  --ECG shows no acute ischemia today  --Continue aspirin, carvedilol, and high intensity statin    2. Essential hypertension  --Acceptable blood pressure control today  --Encourage low sodium diet and conservative weight-loss for increased BP control  --Will reassess at follow-up    3. Ischemic cardiomyopathy  --LVEF 40% on echocardiogram in 3/21  --NYHA Functional Class II  --Continue carvedilol and losartan  --Plan for repeat echocardiogram  --Consider Entresto    4. Atrial fibrillation with RVR (HCC)  --Limited episode of postoperative atrial fibrillation  --ECG shows sinus rhythm  --Defer further monitoring at this time, low threshold for outpatient cardiac monitoring if recurrent symptoms to suggest PAF    5. Hyperlipidemia LDL goal <70  --3/21 LDL 71 mg/dL  --LDL goal < 70 mg/dL  --Continue high-intensity statin    6. Hoarseness or changing voice  --Patient advised to follow-up with PCP for further evaluation    7. Keloid scar of chest  --Status post sternotomy with CABG  --Benign finding      Preventative Cardiology:   Tobacco Cessation: N/A   Advance Care Planning: ACP discussion was declined by the patient. Patient does not have an advance directive, declines further assistance.     Follow Up:   Return in about 1 month (around 11/21/2021) for Follow-up with Dr. Johnson.      Thank you for allowing me to participate in the care of your patient. Please to not hesitate to contact me with additional questions or concerns.     SHANEL German

## 2021-10-22 PROBLEM — R49.9 HOARSENESS OR CHANGING VOICE: Status: ACTIVE | Noted: 2021-10-22

## 2021-10-22 PROBLEM — L91.0 KELOID SCAR: Status: ACTIVE | Noted: 2021-10-22

## 2021-11-22 DIAGNOSIS — I25.5 ISCHEMIC CARDIOMYOPATHY: ICD-10-CM

## 2021-11-22 RX ORDER — LOSARTAN POTASSIUM 25 MG/1
TABLET ORAL
Qty: 90 TABLET | Refills: 0 | OUTPATIENT
Start: 2021-11-22

## 2021-11-22 RX ORDER — LOSARTAN POTASSIUM 25 MG/1
12.5 TABLET ORAL
Qty: 90 TABLET | Refills: 0 | Status: SHIPPED | OUTPATIENT
Start: 2021-11-22 | End: 2022-05-13

## 2022-01-31 ENCOUNTER — OFFICE VISIT (OUTPATIENT)
Dept: CARDIOLOGY | Facility: CLINIC | Age: 62
End: 2022-01-31

## 2022-01-31 VITALS
OXYGEN SATURATION: 97 % | WEIGHT: 245 LBS | SYSTOLIC BLOOD PRESSURE: 122 MMHG | HEIGHT: 70 IN | BODY MASS INDEX: 35.07 KG/M2 | DIASTOLIC BLOOD PRESSURE: 78 MMHG | HEART RATE: 82 BPM

## 2022-01-31 DIAGNOSIS — I25.5 ISCHEMIC CARDIOMYOPATHY: ICD-10-CM

## 2022-01-31 DIAGNOSIS — I48.91 ATRIAL FIBRILLATION WITH RVR: ICD-10-CM

## 2022-01-31 DIAGNOSIS — I25.10 CORONARY ARTERY DISEASE INVOLVING NATIVE CORONARY ARTERY OF NATIVE HEART WITHOUT ANGINA PECTORIS: Primary | ICD-10-CM

## 2022-01-31 DIAGNOSIS — I10 ESSENTIAL HYPERTENSION: ICD-10-CM

## 2022-01-31 DIAGNOSIS — E78.5 HYPERLIPIDEMIA LDL GOAL <70: Chronic | ICD-10-CM

## 2022-01-31 PROCEDURE — 99213 OFFICE O/P EST LOW 20 MIN: CPT | Performed by: NURSE PRACTITIONER

## 2022-01-31 NOTE — PROGRESS NOTES
"             [facility] Cardiology Office Follow Up Note    Sony Marks  [id number]  01/31/2022    Primary Care Provider: Johan Tse MD   Referring Provider: No ref. provider found    Chief Complaint: Regular follow-up    History of Present Illness:   Mr. Sony Marks is a 61 y.o. male who presents to the Cardiology Clinic for regular follow-up.  The patient has a past medical history significant for hypertension and prior tobacco use.  He has a past cardiac history significant for an anterior STEMI in 2011.  At that time, he reportedly underwent thrombolytic therapy and subsequent thrombectomy with PCI and placement of a 3.0 x 28 mm stent in the LAD.  Most recently, he underwent coronary angiography in 2/21 for unstable angina was found to have severe multivessel disease.  He subcentimeter 3V CABG with a LIMA-LAD, SVG-RCA, and SVG-OM1.  He presents the cardiology clinic today for regular follow-up.  When he was last seen in 10/21, he reported fullness and discomfort of his neck since his bypass surgery.  He also complained of hoarseness of voice which has been persistent since his bypass as well.  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  A subsequent echocardiogram showed an LVEF = 50%, which was improved from his 3/21 echo (where LVEF = 40%).  He presents today for regular follow-up.  Patient reports feeling \"wonderful\".  He states he has been active chopping wood and preparing to start planting his garden.  He is able to do this without any chest discomfort or dyspnea on exertion.  He reports that the \"poking\" sensation finally resolved in his chest and he is back to feeling like his old self.  He denies palpitations, dizziness, syncope.  He denies orthopnea, PND, and lower extremity edema.  He offers no other complaints or concerns at " this time.    Past Cardiac Testin. Last Coronary Angio: 3/8/2021              1.  Severe multivessel coronary artery disease including:                            -Patent stent in the proximal LAD with severe stenosis in the mid LAD at the outflow of the prior stent                            -Severe stenosis in the proximal portion of a large OM1, with a widely patent stent in the mid OM1                            -Severe stenosis in the proximal RCA              2.  Moderately elevated LVEDP, 25 mmHg.  2. Prior Stress Testing: None  3. Last Echo: 10/28/2021   1. Estimated left ventricular EF = 50% Left ventricular ejection fraction appears to be 46 - 50%. Left ventricular systolic function is normal.   2. Left ventricular diastolic function was normal.   3. There is calcification of the aortic valve mainly affecting the non-coronary cusp(s).   4. Left ventricular wall thickness is consistent with mild concentric hypertrophy.   5. Estimated right ventricular systolic pressure from tricuspid regurgitation is normal (<35 mmHg). Calculated right ventricular systolic pressure from tricuspid regurgitation is 24 mmHg.  4. Prior Holter Monitor: None    Review of Systems:   Review of Systems   Constitutional: Negative for activity change, chills, diaphoresis, fatigue, fever and unexpected weight gain.   Eyes: Negative for blurred vision and visual disturbance.   Respiratory: Negative for apnea, cough, chest tightness, shortness of breath and wheezing.    Cardiovascular: Negative for chest pain, palpitations and leg swelling.   Gastrointestinal: Negative for abdominal distention, blood in stool, GERD and indigestion.   Endocrine: Negative for cold intolerance and heat intolerance.   Genitourinary: Negative for hematuria.   Musculoskeletal: Negative for gait problem, joint swelling and myalgias.   Skin: Negative for color change, pallor and wound.   Neurological: Negative for dizziness, seizures, syncope, weakness,  "light-headedness, numbness, headache and confusion.   Hematological: Does not bruise/bleed easily.   Psychiatric/Behavioral: Negative for behavioral problems, sleep disturbance, suicidal ideas and depressed mood.     I have reviewed and confirmed the accuracy of the ROS as documented by the MA/LPN/RN SHANEL Bailon    I have reviewed and/or updated the patient's past medical, past surgical, family, social history, problem list and allergies as appropriate.     Medications:     Current Outpatient Medications:   •  aspirin 81 MG EC tablet, Take 1 tablet by mouth Daily., Disp: 180 tablet, Rfl: 3  •  atorvastatin (LIPITOR) 80 MG tablet, Take 1 tablet by mouth Daily., Disp: 90 tablet, Rfl: 3  •  carvedilol (COREG) 6.25 MG tablet, Take 1 tablet by mouth 2 (Two) Times a Day With Meals., Disp: 180 tablet, Rfl: 1  •  Dapagliflozin Propanediol (Farxiga) 10 MG tablet, Take 10 mg by mouth Daily., Disp: 90 tablet, Rfl: 3  •  losartan (COZAAR) 25 MG tablet, Take 0.5 tablets by mouth Daily., Disp: 90 tablet, Rfl: 0  •  nitroglycerin (NITROSTAT) 0.4 MG SL tablet, 1 under the tongue as needed for angina, may repeat q5mins for up three doses (Patient taking differently: Place 0.4 mg under the tongue Every 5 (Five) Minutes As Needed. 1 under the tongue as needed for angina, may repeat q5mins for up three doses), Disp: 30 tablet, Rfl: 3  No current facility-administered medications for this visit.    Facility-Administered Medications Ordered in Other Visits:   •  Chlorhexidine Gluconate Cloth 2 % pads 1 application, 1 application, Topical, Q12H PRN, Toma Alba PA-C    Physical Exam:  Vital Signs:   Vitals:    01/31/22 1356   BP: 122/78   BP Location: Right arm   Patient Position: Sitting   Cuff Size: Adult   Pulse: 82   SpO2: 97%   Weight: 111 kg (245 lb)   Height: 177.8 cm (70\")     Body mass index is 35.15 kg/m².    Physical Exam  Vitals and nursing note reviewed.   Constitutional:       General: He is not in acute " distress.     Appearance: He is well-developed. He is obese.   HENT:      Head: Normocephalic and atraumatic.   Eyes:      General: No scleral icterus.     Extraocular Movements: Extraocular movements intact.   Neck:      Trachea: Trachea normal.   Cardiovascular:      Rate and Rhythm: Normal rate and regular rhythm.      Pulses: Normal pulses.      Heart sounds: Normal heart sounds. No murmur heard.  No friction rub. No gallop.    Pulmonary:      Effort: Pulmonary effort is normal.      Breath sounds: Normal breath sounds.   Abdominal:      Palpations: Abdomen is soft.      Tenderness: There is no abdominal tenderness.   Musculoskeletal:         General: Normal range of motion.      Cervical back: Neck supple.      Right lower leg: No edema.      Left lower leg: No edema.   Skin:     General: Skin is warm and dry.      Findings: No bruising, lesion or rash.   Neurological:      Mental Status: He is alert and oriented to person, place, and time.      Motor: No weakness.      Gait: Gait normal.   Psychiatric:         Mood and Affect: Mood normal.         Behavior: Behavior normal. Behavior is cooperative.         Thought Content: Thought content does not include suicidal ideation.         Results Review:   I reviewed the patient's new clinical results.      Assessment / Plan:     1. Coronary artery disease involving native coronary artery of native heart without angina pectoris (Primary)  --History of anterior MI in 2011 with PCI to the LAD, subsequent 3V CABG in 3/21  --Currently without angina or exertional symptoms  --Continue aspirin, carvedilol, and high intensity statin  --Follow-up with Dr. Johnson in 6 months or sooner if needed     2. Essential hypertension  --Well controlled blood pressure control today     3. Ischemic cardiomyopathy  --10/21, LVEF 50% on echocardiogram, improved from 40% in 3/21  --NYHA Functional Class I  --Continue carvedilol and losartan    4. Atrial fibrillation with RVR (HCC)  --Limited  episode of postoperative atrial fibrillation  --No symptoms to suggest recurrent PAF     5. Hyperlipidemia LDL goal <70  --LDL goal < 70 mg/dL  --Continue high-intensity statin       Preventative Cardiology:   Tobacco Cessation: N/A   Advance Care Planning: ACP discussion was declined by the patient. Patient does not have an advance directive, declines further assistance.     Follow Up:   Return in about 6 months (around 7/31/2022) for Follow-up with Dr. Johnson.      Thank you for allowing me to participate in the care of your patient. Please to not hesitate to contact me with additional questions or concerns.     Catherine Vuong, APRN

## 2022-03-03 DIAGNOSIS — I25.5 ISCHEMIC CARDIOMYOPATHY: ICD-10-CM

## 2022-03-03 DIAGNOSIS — I25.110 CORONARY ARTERY DISEASE INVOLVING NATIVE CORONARY ARTERY OF NATIVE HEART WITH UNSTABLE ANGINA PECTORIS: ICD-10-CM

## 2022-03-03 RX ORDER — CARVEDILOL 6.25 MG/1
TABLET ORAL
Qty: 180 TABLET | Refills: 3 | Status: SHIPPED | OUTPATIENT
Start: 2022-03-03 | End: 2023-02-06

## 2022-03-03 RX ORDER — ATORVASTATIN CALCIUM 80 MG/1
TABLET, FILM COATED ORAL
Qty: 90 TABLET | Refills: 3 | Status: SHIPPED | OUTPATIENT
Start: 2022-03-03 | End: 2023-02-06

## 2022-05-12 DIAGNOSIS — I25.5 ISCHEMIC CARDIOMYOPATHY: ICD-10-CM

## 2022-05-13 RX ORDER — LOSARTAN POTASSIUM 25 MG/1
TABLET ORAL
Qty: 90 TABLET | Refills: 1 | Status: SHIPPED | OUTPATIENT
Start: 2022-05-13 | End: 2023-03-17

## 2022-06-21 DIAGNOSIS — I25.5 ISCHEMIC CARDIOMYOPATHY: ICD-10-CM

## 2022-06-21 DIAGNOSIS — E11.9 TYPE 2 DIABETES MELLITUS WITHOUT COMPLICATION, WITHOUT LONG-TERM CURRENT USE OF INSULIN: ICD-10-CM

## 2022-06-21 RX ORDER — DAPAGLIFLOZIN 10 MG/1
TABLET, FILM COATED ORAL
Qty: 90 TABLET | Refills: 3 | OUTPATIENT
Start: 2022-06-21

## 2022-06-21 RX ORDER — DAPAGLIFLOZIN 10 MG/1
1 TABLET, FILM COATED ORAL DAILY
Qty: 90 TABLET | Refills: 3 | Status: SHIPPED | OUTPATIENT
Start: 2022-06-21 | End: 2023-03-17

## 2022-06-21 RX ORDER — DAPAGLIFLOZIN 10 MG/1
1 TABLET, FILM COATED ORAL DAILY
Qty: 90 TABLET | Refills: 3 | Status: SHIPPED | OUTPATIENT
Start: 2022-06-21 | End: 2022-06-21

## 2022-06-21 NOTE — TELEPHONE ENCOUNTER
Caller: armando francois    Relationship: Emergency Contact    Best call back number: [phone]    Requested Prescriptions:   Requested Prescriptions     Pending Prescriptions Disp Refills   • dapagliflozin Propanediol (Farxiga) 10 MG tablet 90 tablet 3     Sig: Take 10 mg by mouth Daily.        Pharmacy where request should be sent: [address] [phone] [facility] [phone] [facility]     Additional details provided by patient: PATIENT HAS BEEN OUT OF MEDICATION THE PAST 2 DAYS     Does the patient have less than a 3 day supply:  [x] Yes  [] No

## 2022-08-01 ENCOUNTER — OFFICE VISIT (OUTPATIENT)
Dept: CARDIOLOGY | Facility: CLINIC | Age: 62
End: 2022-08-01

## 2022-08-01 VITALS
RESPIRATION RATE: 18 BRPM | DIASTOLIC BLOOD PRESSURE: 74 MMHG | BODY MASS INDEX: 35.73 KG/M2 | HEART RATE: 86 BPM | HEIGHT: 70 IN | OXYGEN SATURATION: 96 % | SYSTOLIC BLOOD PRESSURE: 134 MMHG | WEIGHT: 249.6 LBS

## 2022-08-01 DIAGNOSIS — E11.00 TYPE 2 DIABETES MELLITUS WITH HYPEROSMOLARITY WITHOUT COMA, WITHOUT LONG-TERM CURRENT USE OF INSULIN: Chronic | ICD-10-CM

## 2022-08-01 DIAGNOSIS — I48.91 ATRIAL FIBRILLATION WITH RVR: ICD-10-CM

## 2022-08-01 DIAGNOSIS — I25.5 ISCHEMIC CARDIOMYOPATHY: ICD-10-CM

## 2022-08-01 DIAGNOSIS — I25.10 CORONARY ARTERY DISEASE INVOLVING NATIVE CORONARY ARTERY OF NATIVE HEART WITHOUT ANGINA PECTORIS: Primary | ICD-10-CM

## 2022-08-01 DIAGNOSIS — I10 ESSENTIAL HYPERTENSION: ICD-10-CM

## 2022-08-01 DIAGNOSIS — E78.5 HYPERLIPIDEMIA LDL GOAL <70: Chronic | ICD-10-CM

## 2022-08-01 PROCEDURE — 99214 OFFICE O/P EST MOD 30 MIN: CPT | Performed by: INTERNAL MEDICINE

## 2022-08-01 NOTE — PROGRESS NOTES
[facility] Cardiology Office Follow Up Note    Sony Marks  [id number]  2022    Primary Care Provider: Johan Tse MD    Chief Complaint: Routine follow-up    History of Present Illness:   Mr. Sony Marks is a 61 y.o. male who presents to the Cardiology Clinic for regular follow-up.  The patient has a past medical history significant for hypertension and prior tobacco use.  He has a past cardiac history significant for an anterior STEMI in .  At that time, he reportedly underwent thrombolytic therapy and subsequent thrombectomy with PCI and placement of a 3.0 x 28 mm stent in the LAD.  Most recently, he underwent coronary angiography in  for unstable angina was found to have severe multivessel disease.  He subcentimeter 3V CABG with a LIMA-LAD, SVG-RCA, and SVG-OM1.  He returns to cardiology clinic today for routine follow-up.  Since last appointment, the patient reports he has been well without significant changes in his health.  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  He has occasional, mild chest discomfort related to his sternotomy.  No significant exertional dyspnea.  He continues tolerate his current cardiac medications without significant difficulty.  No other specific complaints today.    Past Cardiac Testin. Last Coronary Angio: 3/8/2021              1.  Severe multivessel coronary artery disease including:                            -Patent stent in the proximal LAD with severe stenosis in the mid LAD at the outflow of the prior stent                            -Severe stenosis in the proximal portion of a large OM1, with a widely patent stent in the mid OM1                            -Severe stenosis in the proximal RCA              2.  Moderately elevated LVEDP, 25 mmHg.  2. Prior Stress Testing: None  3. Last Echo: 10/28/2021              1. Estimated left ventricular EF = 50% Left  ventricular ejection fraction appears to be 46 - 50%. Left ventricular systolic function is normal.              2. Left ventricular diastolic function was normal.       3. There is calcification of the aortic valve mainly affecting the non-coronary cusp(s).       4. Left ventricular wall thickness is consistent with mild concentric hypertrophy.       5. Estimated right ventricular systolic pressure from tricuspid regurgitation is normal (<35 mmHg). Calculated right ventricular systolic pressure from tricuspid regurgitation is 24 mmHg.  4. Prior Holter Monitor: None    Review of Systems:   Review of Systems   Constitutional: Negative for activity change, appetite change, chills, diaphoresis, fatigue, fever, unexpected weight gain and unexpected weight loss.   Eyes: Negative for blurred vision and double vision.   Respiratory: Negative for cough, chest tightness, shortness of breath and wheezing.    Cardiovascular: Positive for chest pain. Negative for palpitations and leg swelling.   Gastrointestinal: Negative for abdominal pain, anal bleeding, blood in stool and GERD.   Endocrine: Negative for cold intolerance and heat intolerance.   Genitourinary: Negative for hematuria.   Neurological: Negative for dizziness, syncope, weakness and light-headedness.   Hematological: Does not bruise/bleed easily.   Psychiatric/Behavioral: Negative for depressed mood and stress. The patient is not nervous/anxious.        I have reviewed and/or updated the patient's past medical, past surgical, family, social history, problem list and allergies as appropriate.     Medications:     Current Outpatient Medications:   •  aspirin 81 MG EC tablet, Take 1 tablet by mouth Daily., Disp: 180 tablet, Rfl: 3  •  atorvastatin (LIPITOR) 80 MG tablet, TAKE ONE (1) TABLET BY MOUTH DAILY, Disp: 90 tablet, Rfl: 3  •  carvedilol (COREG) 6.25 MG tablet, TAKE ONE (1) TABLET BY MOUTH TWO (2) TIMES A DAY WITH MEALS., Disp: 180 tablet, Rfl: 3  •   "dapagliflozin Propanediol (Farxiga) 10 MG tablet, Take 10 mg by mouth Daily., Disp: 90 tablet, Rfl: 3  •  losartan (COZAAR) 25 MG tablet, TAKE ONE HALF (1/2) TABLET BY MOUTH DAILY, Disp: 90 tablet, Rfl: 1  •  nitroglycerin (NITROSTAT) 0.4 MG SL tablet, 1 under the tongue as needed for angina, may repeat q5mins for up three doses (Patient taking differently: Place 0.4 mg under the tongue Every 5 (Five) Minutes As Needed. 1 under the tongue as needed for angina, may repeat q5mins for up three doses), Disp: 30 tablet, Rfl: 3  No current facility-administered medications for this visit.    Facility-Administered Medications Ordered in Other Visits:   •  Chlorhexidine Gluconate Cloth 2 % pads 1 application, 1 application, Topical, Q12H PRN, Toma Alba PA-C    Physical Exam:  Vital Signs:   Vitals:    08/01/22 1317   BP: 134/74   BP Location: Right arm   Patient Position: Sitting   Cuff Size: Adult   Pulse: 86   Resp: 18   SpO2: 96%   Weight: 113 kg (249 lb 9.6 oz)   Height: 177.8 cm (70\")       Physical Exam  Constitutional:       General: He is not in acute distress.     Appearance: Normal appearance. He is not diaphoretic.   HENT:      Head: Normocephalic and atraumatic.   Cardiovascular:      Rate and Rhythm: Normal rate and regular rhythm.      Heart sounds: No murmur heard.  Pulmonary:      Effort: Pulmonary effort is normal. No respiratory distress.      Breath sounds: Normal breath sounds. No stridor. No wheezing, rhonchi or rales.   Abdominal:      General: Bowel sounds are normal. There is no distension.      Palpations: Abdomen is soft.      Tenderness: There is no abdominal tenderness. There is no guarding or rebound.   Musculoskeletal:         General: No swelling. Normal range of motion.      Cervical back: Neck supple. No tenderness.   Skin:     General: Skin is warm and dry.      Comments: Scar tissue noted at the sternotomy site   Neurological:      General: No focal deficit present.      Mental " Status: He is alert and oriented to person, place, and time.   Psychiatric:         Mood and Affect: Mood normal.         Behavior: Behavior normal.         Results Review:   I reviewed the patient's new clinical results.       Assessment / Plan:     1.  Multivessel coronary artery disease   --History of anterior MI in 2011 with PCI to the LAD, subsequent 3V CABG in 3/21  --Remains active without chest pain or exertional angina  --Continue aspirin 81 mg daily  --Continue high intensity statin  --Follow-up in 1 year, sooner if required     2. Essential hypertension  --Remains adequately controlled with current antihypertensives     3. Ischemic cardiomyopathy  --10/21, LVEF 50% on echocardiogram  --Remains euvolemic and well compensated, NYHA class I  --Continue carvedilol and losartan     4. Atrial fibrillation with RVR (HCC)  --Limited episode of postoperative atrial fibrillation  --No symptoms to suggest recurrent PAF     5. Hyperlipidemia LDL goal <70  --LDL goal < 70 mg/dL  --Continue high-intensity statin  -- Repeat lipid profile ordered    6.  Type 2 diabetes mellitus  -- Last hemoglobin A1c 6.6% at the time of CABG  --On Farxiga  --Will repeat hemoglobin A1c      Follow Up:   Return in about 1 year (around 8/1/2023).      Thank you for allowing me to participate in the care of your patient. Please to not hesitate to contact me with additional questions or concerns.     GISEL Johnson MD  Interventional Cardiology   08/01/2022  13:22 EDT

## 2022-10-27 ENCOUNTER — TELEPHONE (OUTPATIENT)
Dept: CARDIOLOGY | Facility: CLINIC | Age: 62
End: 2022-10-27

## 2022-10-27 NOTE — TELEPHONE ENCOUNTER
Caller: armando francois    Relationship to patient: Emergency Contact    Best call back number: [phone]    Patient is needing: PT HAD OPEN HEART SURGERY MARCH 2021, SINCE THEN HE HAS WHAT FELT LIKE A PINCH NERVE THAT HAS SHOT DOWN TO HIS ARM. IT HAS GOTTEN WORSE THE LAST SEVERAL WEEKS TO WHERE PT IS HAVING A HARD TIME MOVING HIS NECK AND PT WOULD LIKE A REFERRAL TO A DOCTOR TO HAVE THIS LOOKED AT.

## 2023-02-05 DIAGNOSIS — I25.110 CORONARY ARTERY DISEASE INVOLVING NATIVE CORONARY ARTERY OF NATIVE HEART WITH UNSTABLE ANGINA PECTORIS: ICD-10-CM

## 2023-02-05 DIAGNOSIS — I25.5 ISCHEMIC CARDIOMYOPATHY: ICD-10-CM

## 2023-02-06 RX ORDER — CARVEDILOL 6.25 MG/1
TABLET ORAL
Qty: 60 TABLET | Refills: 0 | Status: SHIPPED | OUTPATIENT
Start: 2023-02-06

## 2023-02-06 RX ORDER — ATORVASTATIN CALCIUM 80 MG/1
TABLET, FILM COATED ORAL
Qty: 30 TABLET | Refills: 0 | Status: SHIPPED | OUTPATIENT
Start: 2023-02-06

## 2023-03-02 ENCOUNTER — TRANSCRIBE ORDERS (OUTPATIENT)
Dept: ADMINISTRATIVE | Facility: HOSPITAL | Age: 63
End: 2023-03-02
Payer: COMMERCIAL

## 2023-03-02 ENCOUNTER — TRANSCRIBE ORDERS (OUTPATIENT)
Dept: GENERAL RADIOLOGY | Facility: HOSPITAL | Age: 63
End: 2023-03-02
Payer: COMMERCIAL

## 2023-03-02 ENCOUNTER — HOSPITAL ENCOUNTER (OUTPATIENT)
Dept: GENERAL RADIOLOGY | Facility: HOSPITAL | Age: 63
Discharge: HOME | End: 2023-03-02
Admitting: INTERNAL MEDICINE
Payer: COMMERCIAL

## 2023-03-02 DIAGNOSIS — R06.02 SOB (SHORTNESS OF BREATH): Primary | ICD-10-CM

## 2023-03-02 DIAGNOSIS — R06.02 SHORTNESS OF BREATH: Primary | ICD-10-CM

## 2023-03-02 DIAGNOSIS — R06.02 SHORTNESS OF BREATH: ICD-10-CM

## 2023-03-02 PROCEDURE — 71046 X-RAY EXAM CHEST 2 VIEWS: CPT

## 2023-03-16 DIAGNOSIS — I25.5 ISCHEMIC CARDIOMYOPATHY: ICD-10-CM

## 2023-03-16 DIAGNOSIS — E11.9 TYPE 2 DIABETES MELLITUS WITHOUT COMPLICATION, WITHOUT LONG-TERM CURRENT USE OF INSULIN: ICD-10-CM

## 2023-03-17 RX ORDER — LOSARTAN POTASSIUM 25 MG/1
TABLET ORAL
Qty: 45 TABLET | Refills: 2 | Status: SHIPPED | OUTPATIENT
Start: 2023-03-17

## 2023-03-17 RX ORDER — DAPAGLIFLOZIN 10 MG/1
TABLET, FILM COATED ORAL
Qty: 90 TABLET | Refills: 2 | Status: SHIPPED | OUTPATIENT
Start: 2023-03-17

## 2023-04-10 DIAGNOSIS — I25.5 ISCHEMIC CARDIOMYOPATHY: ICD-10-CM

## 2023-04-10 DIAGNOSIS — I25.110 CORONARY ARTERY DISEASE INVOLVING NATIVE CORONARY ARTERY OF NATIVE HEART WITH UNSTABLE ANGINA PECTORIS: ICD-10-CM

## 2023-04-10 RX ORDER — CARVEDILOL 6.25 MG/1
TABLET ORAL
Qty: 60 TABLET | Refills: 0 | Status: SHIPPED | OUTPATIENT
Start: 2023-04-10

## 2023-04-24 RX ORDER — ATORVASTATIN CALCIUM 80 MG/1
TABLET, FILM COATED ORAL
Qty: 30 TABLET | Refills: 0 | Status: SHIPPED | OUTPATIENT
Start: 2023-04-24

## 2023-04-24 NOTE — TELEPHONE ENCOUNTER
LAST OFFICE VISIT:08/01/2022    NEXT OFFICE VISIT:08/02/2023    Called patient's pcp to request labs      Jina with medical records with [facility], states the most recent they have is from June 2021. I didn't see those scanned in, so she is going to fax these.

## 2023-05-04 DIAGNOSIS — I25.5 ISCHEMIC CARDIOMYOPATHY: ICD-10-CM

## 2023-05-04 DIAGNOSIS — I25.110 CORONARY ARTERY DISEASE INVOLVING NATIVE CORONARY ARTERY OF NATIVE HEART WITH UNSTABLE ANGINA PECTORIS: ICD-10-CM

## 2023-05-05 RX ORDER — CARVEDILOL 6.25 MG/1
TABLET ORAL
Qty: 60 TABLET | Refills: 3 | Status: SHIPPED | OUTPATIENT
Start: 2023-05-05

## 2023-05-17 RX ORDER — ATORVASTATIN CALCIUM 80 MG/1
TABLET, FILM COATED ORAL
Qty: 30 TABLET | Refills: 11 | Status: SHIPPED | OUTPATIENT
Start: 2023-05-17

## 2023-07-15 PROBLEM — R07.9 CHEST PAIN: Status: ACTIVE | Noted: 2023-07-15

## 2023-08-02 ENCOUNTER — OFFICE VISIT (OUTPATIENT)
Dept: CARDIOLOGY | Facility: CLINIC | Age: 63
End: 2023-08-02
Payer: COMMERCIAL

## 2023-08-02 VITALS
OXYGEN SATURATION: 95 % | SYSTOLIC BLOOD PRESSURE: 124 MMHG | HEART RATE: 85 BPM | WEIGHT: 217 LBS | HEIGHT: 70 IN | BODY MASS INDEX: 31.07 KG/M2 | DIASTOLIC BLOOD PRESSURE: 72 MMHG

## 2023-08-02 DIAGNOSIS — I25.10 CORONARY ARTERY DISEASE INVOLVING NATIVE CORONARY ARTERY OF NATIVE HEART WITHOUT ANGINA PECTORIS: Primary | ICD-10-CM

## 2023-08-02 DIAGNOSIS — E11.00 TYPE 2 DIABETES MELLITUS WITH HYPEROSMOLARITY WITHOUT COMA, WITHOUT LONG-TERM CURRENT USE OF INSULIN: Chronic | ICD-10-CM

## 2023-08-02 DIAGNOSIS — I25.5 ISCHEMIC CARDIOMYOPATHY: ICD-10-CM

## 2023-08-02 DIAGNOSIS — E78.5 HYPERLIPIDEMIA LDL GOAL <70: Chronic | ICD-10-CM

## 2023-08-02 DIAGNOSIS — I10 ESSENTIAL HYPERTENSION: ICD-10-CM

## 2023-08-02 DIAGNOSIS — Z95.1 S/P CABG (CORONARY ARTERY BYPASS GRAFT): ICD-10-CM

## 2023-08-02 DIAGNOSIS — M54.89 OTHER BACK PAIN, UNSPECIFIED CHRONICITY: ICD-10-CM

## 2023-08-02 DIAGNOSIS — I25.110 CORONARY ARTERY DISEASE INVOLVING NATIVE CORONARY ARTERY OF NATIVE HEART WITH UNSTABLE ANGINA PECTORIS: ICD-10-CM

## 2023-08-02 PROCEDURE — 99213 OFFICE O/P EST LOW 20 MIN: CPT | Performed by: INTERNAL MEDICINE

## 2023-08-02 PROCEDURE — 3078F DIAST BP <80 MM HG: CPT | Performed by: INTERNAL MEDICINE

## 2023-08-02 PROCEDURE — 3074F SYST BP LT 130 MM HG: CPT | Performed by: INTERNAL MEDICINE

## 2023-08-02 RX ORDER — CARVEDILOL 6.25 MG/1
6.25 TABLET ORAL 2 TIMES DAILY WITH MEALS
Qty: 180 TABLET | Refills: 3 | Status: SHIPPED | OUTPATIENT
Start: 2023-08-02

## 2023-08-04 ENCOUNTER — PATIENT ROUNDING (BHMG ONLY) (OUTPATIENT)
Dept: CARDIOLOGY | Facility: CLINIC | Age: 63
End: 2023-08-04
Payer: COMMERCIAL

## 2023-08-23 ENCOUNTER — TELEPHONE (OUTPATIENT)
Dept: CARDIOLOGY | Facility: CLINIC | Age: 63
End: 2023-08-23
Payer: COMMERCIAL

## 2023-08-23 DIAGNOSIS — E11.9 TYPE 2 DIABETES MELLITUS WITHOUT COMPLICATION, WITHOUT LONG-TERM CURRENT USE OF INSULIN: Primary | ICD-10-CM

## 2023-08-23 NOTE — TELEPHONE ENCOUNTER
Caller: lj francois    Relationship: Emergency Contact    Best call back number: [phone]    What is the best time to reach you: ANY    Who are you requesting to speak with (clinical staff, provider,  specific staff member): ANY        What was the call regarding: AT LAST VISIT DR LOWERY MENTIONED 2 PROVIDERS THAT COULD BE A PCP FOR PATIENT. LJ CALLED AND WOULD LIKE A REFERRAL SENT TO THE [place] PROVIDER PLEASE. UNSURE OF THE PROVIDER NAMES BUT ONE WAS IN [place] AND THE OTHER [place]. PLEASE REFER TO FOREST.     Is it okay if the provider responds through [facility]: CALL IF QUESTIONS.

## 2023-08-24 NOTE — TELEPHONE ENCOUNTER
MUNA CALHOUN. INFORMED TO MAKE SURE HE IS ON A WAIT LIST AND CALL THEIR OFFICE EVERY COUPLE DAYS TO SEE IF ANYONE HAS CANCELLED.  SHE STATED UNDERSTANDING.

## 2023-08-24 NOTE — TELEPHONE ENCOUNTER
Caller: jl francois    Relationship: Emergency Contact    Best call back number: [phone]     What is the best time to reach you: ANYTIME    Do you know the name of the person who called: LJ GIBBS    What was the call regarding: PATIENT WAS UNABLE TO GET A SOON APPOINTMENT WITH . THE APPOINTMENT WAS MADE FOR 10.13.23. THE PATIENT IS FORGETTING MORE AS DAYS PASS AND HE IS GETTING FRUSTRATED WITH IT. THE PATIENT DOES HAVE A FAMILY HISTORY OF DEMENTIA AND THEY ARE CONCERNED ABOUT THIS. THEY WOULD LIKE TO KNOW IF THERE IS ANYTHING  CAN DO TO ASSIST WITH GETTING A SOONER APPOINTMENT.

## 2023-10-13 ENCOUNTER — OFFICE VISIT (OUTPATIENT)
Dept: INTERNAL MEDICINE | Facility: CLINIC | Age: 63
End: 2023-10-13
Payer: COMMERCIAL

## 2023-10-13 VITALS
DIASTOLIC BLOOD PRESSURE: 74 MMHG | OXYGEN SATURATION: 97 % | SYSTOLIC BLOOD PRESSURE: 134 MMHG | TEMPERATURE: 97.6 F | HEIGHT: 70 IN | RESPIRATION RATE: 16 BRPM | WEIGHT: 224 LBS | HEART RATE: 80 BPM | BODY MASS INDEX: 32.07 KG/M2

## 2023-10-13 DIAGNOSIS — R41.3 MEMORY LOSS OR IMPAIRMENT: Primary | ICD-10-CM

## 2023-10-13 DIAGNOSIS — I25.112 CORONARY ARTERY DISEASE INVOLVING NATIVE HEART WITH REFRACTORY ANGINA PECTORIS, UNSPECIFIED VESSEL OR LESION TYPE: ICD-10-CM

## 2023-10-13 DIAGNOSIS — Z00.00 ENCOUNTER FOR PREVENTIVE HEALTH EXAMINATION: ICD-10-CM

## 2023-10-13 DIAGNOSIS — R73.03 BORDERLINE DIABETES: ICD-10-CM

## 2023-10-13 DIAGNOSIS — Z12.5 SCREENING FOR PROSTATE CANCER: ICD-10-CM

## 2023-10-13 NOTE — PROGRESS NOTES
Chief Complaint   Patient presents with    [facility]     Was seeing [facility]    Annual Exam    Memory Loss     After having open heart surgery, having lapses and confusion-couple of years       Subjective     History of Present Illness   Sony Marks is a 62 y.o. male presenting for annual physical and to establish care.  Patient was formally following with [facility].  Preventive health maintenance was reviewed and discussed today. Vaccines were updated.  Patient shares that she did suffer with significant coronary artery disease for which he required CABG earlier this year.  Since this procedure was performed, patient shares that he has been having issues with maintaining his memory.    He works in a commercial kitchen and notes that he has misbaled bottles, forgets why he goes to go get something, and at times loses track of what he is doing. Family and friends requested that he come to be evaluated.      The following portions of the patient's history were reviewed and updated as appropriate: allergies, current medications, past family history, past medical history, past social history, past surgical history and problem list.    Review of Systems   Constitutional:  Negative for chills, fatigue and fever.   HENT:  Negative for congestion, ear pain, rhinorrhea, sinus pressure and sore throat.    Eyes:  Negative for visual disturbance.   Respiratory:  Negative for cough, chest tightness, shortness of breath and wheezing.    Cardiovascular:  Negative for chest pain, palpitations and leg swelling.   Gastrointestinal:  Negative for abdominal pain, blood in stool, constipation, diarrhea, nausea and vomiting.   Endocrine: Negative for polydipsia and polyuria.   Genitourinary:  Negative for dysuria and hematuria.   Musculoskeletal:  Negative for arthralgias and back pain.   Skin:  Negative for rash.   Neurological:  Negative for dizziness, light-headedness, numbness and headaches.   Psychiatric/Behavioral:   Negative for dysphoric mood and sleep disturbance. The patient is not nervous/anxious.        No Known Allergies    Past Medical History:   Diagnosis Date    Coronary artery disease     Heart attack 01/2011    Hyperlipidemia     Hypertension     Type 2 diabetes mellitus        Social History     Socioeconomic History    Marital status: Single    Number of children: 2   Tobacco Use    Smoking status: Former     Types: Cigars    Smokeless tobacco: Never    Tobacco comments:     smoked 3 cigars a week;    Vaping Use    Vaping Use: Never used   Substance and Sexual Activity    Alcohol use: Yes     Comment: rarely     Drug use: Yes     Types: Marijuana     Comment: last use of marijuana Dec 2020 ; rarely     Sexual activity: Defer        Past Surgical History:   Procedure Laterality Date    CARDIAC CATHETERIZATION      CARDIAC CATHETERIZATION N/A 3/8/2021    Procedure: Coronary angiography;  Surgeon: Sree Johnson MD;  Location: [facility] CATH INVASIVE LOCATION;  Service: Cardiology;  Laterality: N/A;    CARDIAC CATHETERIZATION N/A 3/8/2021    Procedure: Left Heart Cath;  Surgeon: Sree Johnson MD;  Location: [facility] CATH INVASIVE LOCATION;  Service: Cardiology;  Laterality: N/A;    COLONOSCOPY  1990    CORONARY ANGIOPLASTY WITH STENT PLACEMENT  01/2011    CORONARY ARTERY BYPASS GRAFT N/A 3/11/2021    Procedure: MEDIAN STERNOTOMY, CORONARY ARTERY BYPASS GRAFTING x3, UTILIZATION OF LEFT INTERAL MAMMARY, EVH OF RIGHT GREATER SAPHENOUS VEIN, GERMAN PER ANESTHESIA;  Surgeon: Panfilo Hamm MD;  Location: [facility] OR;  Service: Cardiothoracic;  Laterality: N/A;  VO: 0845  VR: 0920         Family History   Problem Relation Age of Onset    Alzheimer's disease Mother     Bone cancer Mother     Coronary artery disease Father     Heart attack Father     Diabetes Father     Hypertension Father     Lung disease Father     Pulmonary fibrosis Father     No Known Problems Sister     Heart attack Paternal Grandmother     Heart disease  "Paternal Grandmother     Alzheimer's disease Paternal Grandmother     Hypertension Son     Seizures Son          Current Outpatient Medications:     aspirin 81 MG EC tablet, Take 1 tablet by mouth Daily., Disp: 180 tablet, Rfl: 3    atorvastatin (LIPITOR) 80 MG tablet, TAKE 1 TABLET BY MOUTH EVERY DAY, Disp: 30 tablet, Rfl: 11    carvedilol (COREG) 6.25 MG tablet, Take 1 tablet by mouth 2 (Two) Times a Day With Meals., Disp: 180 tablet, Rfl: 3    Farxiga 10 MG tablet, TAKE 1 TABLET BY MOUTH EVERY DAY, Disp: 90 tablet, Rfl: 2    losartan (COZAAR) 25 MG tablet, TAKE 1/2 TABLET BY MOUTH ONCE A DAY, Disp: 45 tablet, Rfl: 2    nitroglycerin (NITROSTAT) 0.4 MG SL tablet, Place 1 tablet under the tongue Every 5 (Five) Minutes As Needed for Chest Pain. 1 under the tongue as needed for angina, may repeat q5mins for up three doses, Disp: , Rfl:   No current facility-administered medications for this visit.    Facility-Administered Medications Ordered in Other Visits:     Chlorhexidine Gluconate Cloth 2 % pads 1 application, 1 application , Topical, Q12H PRN, Toma Alba PA-C    Objective   /74   Pulse 80   Temp 97.6 øF (36.4 øC)   Resp 16   Ht 177.8 cm (70\")   Wt 102 kg (224 lb)   SpO2 97%   BMI 32.14 kg/mý     Physical Exam  Vitals and nursing note reviewed.   Constitutional:       Appearance: Normal appearance. He is well-developed. He is obese.   HENT:      Head: Normocephalic and atraumatic.      Right Ear: Tympanic membrane and external ear normal.      Left Ear: Tympanic membrane and external ear normal.      Nose: Nose normal. No congestion.      Mouth/Throat:      Mouth: Mucous membranes are moist.      Pharynx: No oropharyngeal exudate.   Eyes:      General: No scleral icterus.        Right eye: No discharge.      Pupils: Pupils are equal, round, and reactive to light.   Neck:      Thyroid: No thyromegaly.      Vascular: No JVD.   Cardiovascular:      Rate and Rhythm: Normal rate and regular rhythm. "      Heart sounds: Normal heart sounds. No murmur heard.     No friction rub.   Pulmonary:      Effort: Pulmonary effort is normal. No respiratory distress.      Breath sounds: Normal breath sounds. No stridor. No wheezing.   Abdominal:      General: Bowel sounds are normal. There is no distension.      Palpations: Abdomen is soft.      Tenderness: There is no abdominal tenderness. There is no guarding.   Genitourinary:     Comments: Deferred  Musculoskeletal:         General: No tenderness. Normal range of motion.      Cervical back: Normal range of motion and neck supple.   Lymphadenopathy:      Cervical: No cervical adenopathy.   Skin:     General: Skin is warm and dry.      Findings: No rash.   Neurological:      Mental Status: He is alert and oriented to person, place, and time.      Cranial Nerves: No cranial nerve deficit.   Psychiatric:         Mood and Affect: Mood normal.         Behavior: Behavior normal.         Thought Content: Thought content normal.         Assessment & Plan   Diagnoses and all orders for this visit:    1. Memory loss or impairment (Primary)  -     CBC & Differential  -     Comprehensive Metabolic Panel  -     Lipid Panel  -     Vitamin B12  -     TSH  -     Vitamin D,25-Hydroxy  -     Folate  -     PSA Screen    2. Encounter for preventive health examination  -     CBC & Differential  -     Comprehensive Metabolic Panel  -     Lipid Panel  -     Vitamin B12  -     TSH  -     Vitamin D,25-Hydroxy  -     Folate  -     PSA Screen    3. Coronary artery disease involving native heart with refractory angina pectoris, unspecified vessel or lesion type  -     CBC & Differential  -     Comprehensive Metabolic Panel  -     Lipid Panel  -     Vitamin B12  -     TSH  -     Vitamin D,25-Hydroxy  -     Folate  -     PSA Screen    4. Screening for prostate cancer  -     PSA Screen    5. Borderline diabetes  -     Hemoglobin A1c          Discussion Summary:  Patient is a 62 y.o. male presenting for  annual physical    1. Preventive Health Maintenance  - Baseline labs are up-to-date or ordered per above.  - Vaccines reviewed and updated  - Preventive health measures were discussed including: healthy diet with increase in fruits and vegetables, regular exercise at least 3 times a week, safe sex practices, avoidance of drugs, tobacco, and alcohol, and regular seatbelt use.    2. Memory loss  - check labs as noted above.  I offered CT head however patient preferred not to do this at this time.   - consider memory clinic or starting Aricept based on lab results.     3.  CAD s/p CABG  - cont following with cardiology.      4. Borderline diabetes  - follow up A1C levels.       Follow up:  No follow-ups on file.     Patient Instructions:  Patient instructions were provided.

## 2023-11-02 DIAGNOSIS — I25.5 ISCHEMIC CARDIOMYOPATHY: ICD-10-CM

## 2023-11-02 RX ORDER — LOSARTAN POTASSIUM 25 MG/1
12.5 TABLET ORAL DAILY
Qty: 45 TABLET | Refills: 2 | Status: SHIPPED | OUTPATIENT
Start: 2023-11-02

## 2023-11-27 ENCOUNTER — TELEPHONE (OUTPATIENT)
Dept: CARDIOLOGY | Facility: CLINIC | Age: 63
End: 2023-11-27
Payer: COMMERCIAL

## 2023-11-27 NOTE — TELEPHONE ENCOUNTER
Attempted to submit a PA for [facility]. PA was unable to process due to the medication being available without PA.

## 2023-12-28 ENCOUNTER — TELEPHONE (OUTPATIENT)
Dept: CARDIOLOGY | Facility: CLINIC | Age: 63
End: 2023-12-28
Payer: COMMERCIAL

## 2023-12-28 NOTE — TELEPHONE ENCOUNTER
LMOM TO RS APPT DUE TO DR. LOWERY OUT OF THE OFFICE..    OFFICE CAN SCHEDULE W/DR. CORNEJO IN JANUARY 2024

## 2024-01-09 LAB
25(OH)D3+25(OH)D2 SERPL-MCNC: 15.4 NG/ML (ref 30–100)
ALBUMIN SERPL-MCNC: 4.5 G/DL (ref 3.5–5.2)
ALBUMIN/GLOB SERPL: 2.1 G/DL
ALP SERPL-CCNC: 79 U/L (ref 39–117)
ALT SERPL-CCNC: 18 U/L (ref 1–41)
AST SERPL-CCNC: 14 U/L (ref 1–40)
BASOPHILS # BLD AUTO: 0.09 10*3/MM3 (ref 0–0.2)
BASOPHILS NFR BLD AUTO: 0.9 % (ref 0–1.5)
BILIRUB SERPL-MCNC: 0.5 MG/DL (ref 0–1.2)
BUN SERPL-MCNC: 13 MG/DL (ref 8–23)
BUN/CREAT SERPL: 14 (ref 7–25)
CALCIUM SERPL-MCNC: 9.8 MG/DL (ref 8.6–10.5)
CHLORIDE SERPL-SCNC: 105 MMOL/L (ref 98–107)
CHOLEST SERPL-MCNC: 173 MG/DL (ref 0–200)
CO2 SERPL-SCNC: 28.6 MMOL/L (ref 22–29)
CREAT SERPL-MCNC: 0.93 MG/DL (ref 0.76–1.27)
EGFRCR SERPLBLD CKD-EPI 2021: 92.8 ML/MIN/1.73
EOSINOPHIL # BLD AUTO: 0.1 10*3/MM3 (ref 0–0.4)
EOSINOPHIL NFR BLD AUTO: 1 % (ref 0.3–6.2)
ERYTHROCYTE [DISTWIDTH] IN BLOOD BY AUTOMATED COUNT: 12.9 % (ref 12.3–15.4)
FOLATE SERPL-MCNC: 6.15 NG/ML (ref 4.78–24.2)
GLOBULIN SER CALC-MCNC: 2.1 GM/DL
GLUCOSE SERPL-MCNC: 111 MG/DL (ref 65–99)
HBA1C MFR BLD: 6.8 % (ref 4.8–5.6)
HCT VFR BLD AUTO: 47.1 % (ref 37.5–51)
HDLC SERPL-MCNC: 52 MG/DL (ref 40–60)
HGB BLD-MCNC: 15.7 G/DL (ref 13–17.7)
IMM GRANULOCYTES # BLD AUTO: 0.03 10*3/MM3 (ref 0–0.05)
IMM GRANULOCYTES NFR BLD AUTO: 0.3 % (ref 0–0.5)
LDLC SERPL CALC-MCNC: 90 MG/DL (ref 0–100)
LYMPHOCYTES # BLD AUTO: 2.58 10*3/MM3 (ref 0.7–3.1)
LYMPHOCYTES NFR BLD AUTO: 26.4 % (ref 19.6–45.3)
MCH RBC QN AUTO: 30.4 PG (ref 26.6–33)
MCHC RBC AUTO-ENTMCNC: 33.3 G/DL (ref 31.5–35.7)
MCV RBC AUTO: 91.3 FL (ref 79–97)
MONOCYTES # BLD AUTO: 0.73 10*3/MM3 (ref 0.1–0.9)
MONOCYTES NFR BLD AUTO: 7.5 % (ref 5–12)
NEUTROPHILS # BLD AUTO: 6.24 10*3/MM3 (ref 1.7–7)
NEUTROPHILS NFR BLD AUTO: 63.9 % (ref 42.7–76)
NRBC BLD AUTO-RTO: 0 /100 WBC (ref 0–0.2)
PLATELET # BLD AUTO: 333 10*3/MM3 (ref 140–450)
POTASSIUM SERPL-SCNC: 5.4 MMOL/L (ref 3.5–5.2)
PROT SERPL-MCNC: 6.6 G/DL (ref 6–8.5)
PSA SERPL-MCNC: 0.98 NG/ML (ref 0–4)
RBC # BLD AUTO: 5.16 10*6/MM3 (ref 4.14–5.8)
SODIUM SERPL-SCNC: 141 MMOL/L (ref 136–145)
TRIGL SERPL-MCNC: 179 MG/DL (ref 0–150)
TSH SERPL DL<=0.005 MIU/L-ACNC: 2.19 UIU/ML (ref 0.27–4.2)
VIT B12 SERPL-MCNC: 407 PG/ML (ref 211–946)
VLDLC SERPL CALC-MCNC: 31 MG/DL (ref 5–40)
WBC # BLD AUTO: 9.77 10*3/MM3 (ref 3.4–10.8)

## 2024-01-12 ENCOUNTER — OFFICE VISIT (OUTPATIENT)
Dept: INTERNAL MEDICINE | Facility: CLINIC | Age: 64
End: 2024-01-12
Payer: COMMERCIAL

## 2024-01-12 VITALS
HEART RATE: 68 BPM | BODY MASS INDEX: 34.79 KG/M2 | SYSTOLIC BLOOD PRESSURE: 138 MMHG | OXYGEN SATURATION: 98 % | HEIGHT: 70 IN | WEIGHT: 243 LBS | DIASTOLIC BLOOD PRESSURE: 82 MMHG | RESPIRATION RATE: 16 BRPM | TEMPERATURE: 97.8 F

## 2024-01-12 DIAGNOSIS — E56.9 VITAMIN DEFICIENCY: Primary | ICD-10-CM

## 2024-01-12 DIAGNOSIS — R41.3 MEMORY LOSS OR IMPAIRMENT: ICD-10-CM

## 2024-01-12 DIAGNOSIS — I25.112 CORONARY ARTERY DISEASE INVOLVING NATIVE HEART WITH REFRACTORY ANGINA PECTORIS, UNSPECIFIED VESSEL OR LESION TYPE: ICD-10-CM

## 2024-01-12 DIAGNOSIS — R73.03 BORDERLINE DIABETES: ICD-10-CM

## 2024-01-12 PROCEDURE — 1159F MED LIST DOCD IN RCRD: CPT | Performed by: INTERNAL MEDICINE

## 2024-01-12 PROCEDURE — 99214 OFFICE O/P EST MOD 30 MIN: CPT | Performed by: INTERNAL MEDICINE

## 2024-01-12 PROCEDURE — 3044F HG A1C LEVEL LT 7.0%: CPT | Performed by: INTERNAL MEDICINE

## 2024-01-12 PROCEDURE — 3075F SYST BP GE 130 - 139MM HG: CPT | Performed by: INTERNAL MEDICINE

## 2024-01-12 PROCEDURE — 1160F RVW MEDS BY RX/DR IN RCRD: CPT | Performed by: INTERNAL MEDICINE

## 2024-01-12 PROCEDURE — 3079F DIAST BP 80-89 MM HG: CPT | Performed by: INTERNAL MEDICINE

## 2024-01-12 RX ORDER — ERGOCALCIFEROL 1.25 MG/1
50000 CAPSULE ORAL WEEKLY
Qty: 6 CAPSULE | Refills: 0 | Status: SHIPPED | OUTPATIENT
Start: 2024-01-12

## 2024-01-12 NOTE — PATIENT INSTRUCTIONS
Diabetes Mellitus and Nutrition, Adult  When you have diabetes, or diabetes mellitus, it is very important to have healthy eating habits because your blood sugar (glucose) levels are greatly affected by what you eat and drink. Eating healthy foods in the right amounts, at about the same times every day, can help you:  Manage your blood glucose.  Lower your risk of heart disease.  Improve your blood pressure.  Reach or maintain a healthy weight.  What can affect my meal plan?  Every person with diabetes is different, and each person has different needs for a meal plan. Your health care provider may recommend that you work with a dietitian to make a meal plan that is best for you. Your meal plan may vary depending on factors such as:  The calories you need.  The medicines you take.  Your weight.  Your blood glucose, blood pressure, and cholesterol levels.  Your activity level.  Other health conditions you have, such as heart or kidney disease.  How do carbohydrates affect me?  Carbohydrates, also called carbs, affect your blood glucose level more than any other type of food. Eating carbs raises the amount of glucose in your blood.  It is important to know how many carbs you can safely have in each meal. This is different for every person. Your dietitian can help you calculate how many carbs you should have at each meal and for each snack.  How does alcohol affect me?  Alcohol can cause a decrease in blood glucose (hypoglycemia), especially if you use insulin or take certain diabetes medicines by mouth. Hypoglycemia can be a life-threatening condition. Symptoms of hypoglycemia, such as sleepiness, dizziness, and confusion, are similar to symptoms of having too much alcohol.  Do not drink alcohol if:  Your health care provider tells you not to drink.  You are pregnant, may be pregnant, or are planning to become pregnant.  If you drink alcohol:  Limit how much you have to:  0-1 drink a day for women.  0-2 drinks a day  "for men.  Know how much alcohol is in your drink. In the [place] one drink equals one 12 oz bottle of beer (355 mL), one 5 oz glass of wine (148 mL), or one 1½ oz glass of hard liquor (44 mL).  Keep yourself hydrated with water, diet soda, or unsweetened iced tea. Keep in mind that regular soda, juice, and other mixers may contain a lot of sugar and must be counted as carbs.  What are tips for following this plan?    Reading food labels  Start by checking the serving size on the Nutrition Facts label of packaged foods and drinks. The number of calories and the amount of carbs, fats, and other nutrients listed on the label are based on one serving of the item. Many items contain more than one serving per package.  Check the total grams (g) of carbs in one serving.  Check the number of grams of saturated fats and trans fats in one serving. Choose foods that have a low amount or none of these fats.  Check the number of milligrams (mg) of salt (sodium) in one serving. Most people should limit total sodium intake to less than 2,300 mg per day.  Always check the nutrition information of foods labeled as \"low-fat\" or \"nonfat.\" These foods may be higher in added sugar or refined carbs and should be avoided.  Talk to your dietitian to identify your daily goals for nutrients listed on the label.  Shopping  Avoid buying canned, pre-made, or processed foods. These foods tend to be high in fat, sodium, and added sugar.  Shop around the outside edge of the grocery store. This is where you will most often find fresh fruits and vegetables, bulk grains, fresh meats, and fresh dairy products.  Cooking  Use low-heat cooking methods, such as baking, instead of high-heat cooking methods, such as deep frying.  Cook using healthy oils, such as olive, canola, or sunflower oil.  Avoid cooking with butter, cream, or high-fat meats.  Meal planning  Eat meals and snacks regularly, preferably at the same times every day. Avoid going long periods " of time without eating.  Eat foods that are high in fiber, such as fresh fruits, vegetables, beans, and whole grains.  Eat 4-6 oz (112-168 g) of lean protein each day, such as lean meat, chicken, fish, eggs, or tofu. One ounce (oz) (28 g) of lean protein is equal to:  1 oz (28 g) of meat, chicken, or fish.  1 egg.  ¼ cup (62 g) of tofu.  Eat some foods each day that contain healthy fats, such as avocado, nuts, seeds, and fish.  What foods should I eat?  Fruits  Berries. Apples. Oranges. Peaches. Apricots. Plums. Grapes. Mangoes. Papayas. Pomegranates. Kiwi. Cherries.  Vegetables  Leafy greens, including lettuce, spinach, kale, chard, roalnd greens, mustard greens, and cabbage. Beets. Cauliflower. Broccoli. Carrots. Green beans. Tomatoes. Peppers. Onions. Cucumbers. [place] sprouts.  Grains  Whole grains, such as whole-wheat or whole-grain bread, crackers, tortillas, cereal, and pasta. Unsweetened oatmeal. Quinoa. Brown or wild rice.  Meats and other proteins  Seafood. Poultry without skin. Lean cuts of poultry and beef. Tofu. Nuts. Seeds.  Dairy  Low-fat or fat-free dairy products such as milk, yogurt, and cheese.  The items listed above may not be a complete list of foods and beverages you can eat and drink. Contact a dietitian for more information.  What foods should I avoid?  Fruits  Fruits canned with syrup.  Vegetables  Canned vegetables. Frozen vegetables with butter or cream sauce.  Grains  Refined white flour and flour products such as bread, pasta, snack foods, and cereals. Avoid all processed foods.  Meats and other proteins  Fatty cuts of meat. Poultry with skin. Breaded or fried meats. Processed meat. Avoid saturated fats.  Dairy  Full-fat yogurt, cheese, or milk.  Beverages  Sweetened drinks, such as soda or iced tea.  The items listed above may not be a complete list of foods and beverages you should avoid. Contact a dietitian for more information.  Questions to ask a health care provider  Do I need  to meet with a certified diabetes care and ?  Do I need to meet with a dietitian?  What number can I call if I have questions?  When are the best times to check my blood glucose?  Where to find more information:  American Diabetes Association: diabetes.org  Academy of Nutrition and Dietetics: eatright.org  National [place] of Diabetes and Digestive and Kidney Diseases: niddk.nih.gov  Association of Diabetes Care & Education Specialists: diabeteseducator.org  Summary  It is important to have healthy eating habits because your blood sugar (glucose) levels are greatly affected by what you eat and drink. It is important to use alcohol carefully.  A healthy meal plan will help you manage your blood glucose and lower your risk of heart disease.  Your health care provider may recommend that you work with a dietitian to make a meal plan that is best for you.  This information is not intended to replace advice given to you by your health care provider. Make sure you discuss any questions you have with your health care provider.  Document Revised: 07/21/2021 Document Reviewed: 07/21/2021  Elsevier Patient Education © 2023 [employer]

## 2024-01-12 NOTE — PROGRESS NOTES
Chief Complaint   Patient presents with    Follow-up     3 month follow up-lab review     Memory Loss     Follow up       Subjective     History of Present Illness   Sony Marks is a 63 y.o. male presenting for follow up.     He was last seen 3 months ago and had blood work done on 01/09/2023. 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 Dr. Johnson gave him Farxiga after his open-heart surgery. He was told this medication can treat diabetes mellitus. He was never diagnosed with diabetes mellitus. He denies any symptoms. He drinks a lot of water. He knows he needs to improve his diet.     His memory was his main concern. He has a family history of Alzheimer's. He has always been able to juggle many things in his head at one time with different jobs and numbers, but he still worries. He sets his medicines right out and walks past it. He misses 2 or 3 doses out of 14 doses of his medications. He had some mistakes with numbers his head numbers at work, which he luckily caught. He has not seen neurology. He is worried about false positives with blood work when checking brain conditions. He does not feel like it is serious enough to start taking Aricept.    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    He denies any chest pain. He has a scar from his open-heart surgery which still itches.     The following portions of the patient's history were reviewed and updated as appropriate: allergies, current medications, past family history, past medical history, past social history, past surgical history and problem list.    Review of Systems   Constitutional:  Negative for chills, fatigue and fever.   HENT:  Negative for congestion, ear pain, rhinorrhea, sinus pressure and sore throat.    Eyes:  Negative for visual disturbance.   Respiratory:  Negative for cough, chest tightness, shortness of breath and wheezing.    Cardiovascular:  Negative for chest pain, palpitations and leg swelling.    Gastrointestinal:  Negative for abdominal pain, blood in stool, constipation, diarrhea, nausea and vomiting.   Endocrine: Negative for polydipsia and polyuria.   Genitourinary:  Negative for dysuria and hematuria.   Musculoskeletal:  Negative for arthralgias and back pain.   Skin:  Negative for rash.   Neurological:  Negative for dizziness, light-headedness, numbness and headaches.   Psychiatric/Behavioral:  Negative for dysphoric mood and sleep disturbance. The patient is not nervous/anxious.        No Known Allergies    Past Medical History:   Diagnosis Date    Coronary artery disease     Heart attack 01/2011    Hyperlipidemia     Hypertension     Type 2 diabetes mellitus        Social History     Socioeconomic History    Marital status: Single    Number of children: 2   Tobacco Use    Smoking status: Former     Types: Cigars    Smokeless tobacco: Never    Tobacco comments:     smoked 3 cigars a week;    Vaping Use    Vaping Use: Never used   Substance and Sexual Activity    Alcohol use: Yes     Comment: rarely     Drug use: Yes     Types: Marijuana     Comment: last use of marijuana Dec 2020 ; rarely     Sexual activity: Defer        Past Surgical History:   Procedure Laterality Date    CARDIAC CATHETERIZATION      CARDIAC CATHETERIZATION N/A 3/8/2021    Procedure: Coronary angiography;  Surgeon: Sree Johnson MD;  Location: [facility] CATH INVASIVE LOCATION;  Service: Cardiology;  Laterality: N/A;    CARDIAC CATHETERIZATION N/A 3/8/2021    Procedure: Left Heart Cath;  Surgeon: Sree Johnson MD;  Location: [facility] INVASIVE LOCATION;  Service: Cardiology;  Laterality: N/A;    COLONOSCOPY  1990    CORONARY ANGIOPLASTY WITH STENT PLACEMENT  01/2011    CORONARY ARTERY BYPASS GRAFT N/A 3/11/2021    Procedure: MEDIAN STERNOTOMY, CORONARY ARTERY BYPASS GRAFTING x3, UTILIZATION OF LEFT INTERAL MAMMARY, EVH OF RIGHT GREATER SAPHENOUS VEIN, GERMAN PER ANESTHESIA;  Surgeon: Panfilo Hamm MD;  Location: [facility] OR;  " Service: Cardiothoracic;  Laterality: N/A;  VO: 0845  VR: 0920         Family History   Problem Relation Age of Onset    Alzheimer's disease Mother     Bone cancer Mother     Coronary artery disease Father     Heart attack Father     Diabetes Father     Hypertension Father     Lung disease Father     Pulmonary fibrosis Father     No Known Problems Sister     Heart attack Paternal Grandmother     Heart disease Paternal Grandmother     Alzheimer's disease Paternal Grandmother     Hypertension Son     Seizures Son      His grandmother was diagnosed with dementia very early.       Current Outpatient Medications:     aspirin 81 MG EC tablet, Take 1 tablet by mouth Daily., Disp: 180 tablet, Rfl: 3    atorvastatin (LIPITOR) 80 MG tablet, TAKE 1 TABLET BY MOUTH EVERY DAY, Disp: 30 tablet, Rfl: 11    carvedilol (COREG) 6.25 MG tablet, Take 1 tablet by mouth 2 (Two) Times a Day With Meals., Disp: 180 tablet, Rfl: 3    Farxiga 10 MG tablet, TAKE 1 TABLET BY MOUTH EVERY DAY, Disp: 90 tablet, Rfl: 2    losartan (COZAAR) 25 MG tablet, TAKE 1/2 TABLET BY MOUTH EVERY DAY, Disp: 45 tablet, Rfl: 2    nitroglycerin (NITROSTAT) 0.4 MG SL tablet, Place 1 tablet under the tongue Every 5 (Five) Minutes As Needed for Chest Pain. 1 under the tongue as needed for angina, may repeat q5mins for up three doses, Disp: , Rfl:     vitamin D (ERGOCALCIFEROL) 1.25 MG (09092 UT) capsule capsule, Take 1 capsule by mouth 1 (One) Time Per Week., Disp: 6 capsule, Rfl: 0  No current facility-administered medications for this visit.    Facility-Administered Medications Ordered in Other Visits:     Chlorhexidine Gluconate Cloth 2 % pads 1 application, 1 application , Topical, Q12H PRN, Toma Alba PA-C    Objective   /82   Pulse 68   Temp 97.8 °F (36.6 °C)   Resp 16   Ht 177.8 cm (70\")   Wt 110 kg (243 lb)   SpO2 98%   BMI 34.87 kg/m²     Physical Exam  Vitals and nursing note reviewed.   Constitutional:       Appearance: Normal " appearance. He is well-developed. He is obese.   HENT:      Head: Normocephalic and atraumatic.   Eyes:      Extraocular Movements: Extraocular movements intact.      Conjunctiva/sclera: Conjunctivae normal.   Pulmonary:      Effort: Pulmonary effort is normal.   Musculoskeletal:      Cervical back: Normal range of motion and neck supple.   Skin:     General: Skin is warm and dry.      Findings: No rash.   Neurological:      General: No focal deficit present.      Mental Status: He is alert and oriented to person, place, and time.   Psychiatric:         Mood and Affect: Mood normal.         Behavior: Behavior normal.         Results:   Results reviewed from 01/09/2024.   Hemoglobin A1c was 6.8 percent.    Assessment & Plan   Diagnoses and all orders for this visit:    1. Vitamin deficiency (Primary)  -     vitamin D (ERGOCALCIFEROL) 1.25 MG (82531 UT) capsule capsule; Take 1 capsule by mouth 1 (One) Time Per Week.  Dispense: 6 capsule; Refill: 0    2. Borderline diabetes    3. Coronary artery disease involving native heart with refractory angina pectoris, unspecified vessel or lesion type    4. Memory loss or impairment        Discussion Summary:  Patient is a 63 y.o. male presenting for follow up.    1. Diabetes mellitus.  His hemoglobin A1c has increased from 6.6 to 6.8. He was encouraged to lose weight and cut down on sugary intake. If his hemoglobin A1c is above 6.5 at his next visit, we will start him on metformin.    2. Memory loss.  We discussed starting Aricept to prevent worsening of his memory, but he declined. He was recommended to take vitamin B12 complex. If he is not feeling any difference in 3 months, we can give him a trial of Aricept.    3. Vitamin D deficiency.  His vitamin D levels were on the low side. He was given a prescription for vitamin D once a week. After that, he will take 2000 units of over-the-counter vitamin D.    Follow up:  Return in about 3 months (around 4/12/2024) for Annual  physical.       Transcribed from ambient dictation for Dimitris Mirza DO by Trent Rios.  01/12/24   12:29 EST    Patient or patient representative verbalized consent to the visit recording.  I have personally performed the services described in this document as transcribed by the above individual, and it is both accurate and complete.

## 2024-01-30 ENCOUNTER — OFFICE VISIT (OUTPATIENT)
Dept: CARDIOLOGY | Facility: CLINIC | Age: 64
End: 2024-01-30
Payer: COMMERCIAL

## 2024-01-30 VITALS
DIASTOLIC BLOOD PRESSURE: 84 MMHG | SYSTOLIC BLOOD PRESSURE: 130 MMHG | WEIGHT: 242 LBS | OXYGEN SATURATION: 97 % | BODY MASS INDEX: 34.65 KG/M2 | HEIGHT: 70 IN | HEART RATE: 73 BPM

## 2024-01-30 DIAGNOSIS — E78.5 HYPERLIPIDEMIA LDL GOAL <70: Chronic | ICD-10-CM

## 2024-01-30 DIAGNOSIS — E11.00 TYPE 2 DIABETES MELLITUS WITH HYPEROSMOLARITY WITHOUT COMA, WITHOUT LONG-TERM CURRENT USE OF INSULIN: Chronic | ICD-10-CM

## 2024-01-30 DIAGNOSIS — I48.91 ATRIAL FIBRILLATION WITH RVR: ICD-10-CM

## 2024-01-30 DIAGNOSIS — I25.5 ISCHEMIC CARDIOMYOPATHY: ICD-10-CM

## 2024-01-30 DIAGNOSIS — Z95.1 S/P CABG (CORONARY ARTERY BYPASS GRAFT): ICD-10-CM

## 2024-01-30 DIAGNOSIS — I10 ESSENTIAL HYPERTENSION: ICD-10-CM

## 2024-01-30 DIAGNOSIS — I25.10 CORONARY ARTERY DISEASE INVOLVING NATIVE CORONARY ARTERY OF NATIVE HEART WITHOUT ANGINA PECTORIS: Primary | ICD-10-CM

## 2024-01-30 PROCEDURE — 3075F SYST BP GE 130 - 139MM HG: CPT | Performed by: INTERNAL MEDICINE

## 2024-01-30 PROCEDURE — 3079F DIAST BP 80-89 MM HG: CPT | Performed by: INTERNAL MEDICINE

## 2024-01-30 PROCEDURE — 99213 OFFICE O/P EST LOW 20 MIN: CPT | Performed by: INTERNAL MEDICINE

## 2024-01-30 NOTE — PROGRESS NOTES
[facility] Cardiology Office Follow Up Note    Sony Marks  [id number]  2024    Primary Care Provider: Dimitris Mirza DO    Chief Complaint: Routine follow-up    History of Present Illness:   Mr. Sony Marks is a 63 y.o. male who presents to the Cardiology Clinic for regular follow-up.  The patient has a past medical history significant for hypertension and prior tobacco use.  He has a past cardiac history significant for an anterior STEMI in .  At that time, he reportedly underwent thrombolytic therapy and subsequent thrombectomy with PCI and placement of a 3.0 x 28 mm stent in the LAD.  Most recently, he underwent coronary angiography in  for unstable angina was found to have severe multivessel disease.  He subcentimeter 3V CABG with a LIMA-LAD, SVG-RCA, and SVG-OM1.  He returns today for routine follow-up.  Since his last appointment, the patient reports he has been well from a cardiac standpoint.  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  No significant exertional dyspnea.  He does have some mild discomfort at the sternotomy site.  No recent episodes of palpitations.  His biggest complaint today is concerned regarding occasional forgetfulness.  He does have a family history of Alzheimer's dementia, and is concerned he may be developing Alzheimer's.  No other specific complaints today.    Past Cardiac Testin. Last Coronary Angio: 3/8/2021              1.  Severe multivessel coronary artery disease including:                            -Patent stent in the proximal LAD with severe stenosis in the mid LAD at the outflow of the prior stent                            -Severe stenosis in the proximal portion of a large OM1, with a widely patent stent in the mid OM1                            -Severe stenosis in the proximal RCA              2.  Moderately elevated LVEDP, 25 mmHg.  2. Prior Stress Testing: None  3. Last Echo:  10/28/2021              1. Estimated left ventricular EF = 50% Left ventricular ejection fraction appears to be 46 - 50%. Left ventricular systolic function is normal.              2. Left ventricular diastolic function was normal.       3. There is calcification of the aortic valve mainly affecting the non-coronary cusp(s).       4. Left ventricular wall thickness is consistent with mild concentric hypertrophy.       5. Estimated right ventricular systolic pressure from tricuspid regurgitation is normal (<35 mmHg). Calculated right ventricular systolic pressure from tricuspid regurgitation is 24 mmHg.  4. Prior Holter Monitor: None    Review of Systems:   Review of Systems   Constitutional:  Negative for activity change, appetite change, chills, diaphoresis, fatigue, fever, unexpected weight gain and unexpected weight loss.   Eyes:  Negative for blurred vision and double vision.   Respiratory:  Negative for cough, chest tightness, shortness of breath and wheezing.    Cardiovascular:  Negative for chest pain, palpitations and leg swelling.   Gastrointestinal:  Negative for abdominal pain, anal bleeding, blood in stool and GERD.   Endocrine: Negative for cold intolerance and heat intolerance.   Genitourinary:  Negative for hematuria.   Neurological:  Positive for memory problem. Negative for dizziness, syncope, weakness and light-headedness.   Hematological:  Does not bruise/bleed easily.   Psychiatric/Behavioral:  Negative for depressed mood and stress. The patient is not nervous/anxious.        I have reviewed and/or updated the patient's past medical, past surgical, family, social history, problem list and allergies as appropriate.     Medications:     Current Outpatient Medications:     aspirin 81 MG EC tablet, Take 1 tablet by mouth Daily., Disp: 180 tablet, Rfl: 3    atorvastatin (LIPITOR) 80 MG tablet, TAKE 1 TABLET BY MOUTH EVERY DAY, Disp: 30 tablet, Rfl: 11    carvedilol (COREG) 6.25 MG tablet, Take 1  "tablet by mouth 2 (Two) Times a Day With Meals., Disp: 180 tablet, Rfl: 3    Farxiga 10 MG tablet, TAKE 1 TABLET BY MOUTH EVERY DAY, Disp: 90 tablet, Rfl: 2    losartan (COZAAR) 25 MG tablet, TAKE 1/2 TABLET BY MOUTH EVERY DAY, Disp: 45 tablet, Rfl: 2    nitroglycerin (NITROSTAT) 0.4 MG SL tablet, Place 1 tablet under the tongue Every 5 (Five) Minutes As Needed for Chest Pain. 1 under the tongue as needed for angina, may repeat q5mins for up three doses, Disp: , Rfl:     vitamin D (ERGOCALCIFEROL) 1.25 MG (42643 UT) capsule capsule, Take 1 capsule by mouth 1 (One) Time Per Week., Disp: 6 capsule, Rfl: 0  No current facility-administered medications for this visit.    Facility-Administered Medications Ordered in Other Visits:     Chlorhexidine Gluconate Cloth 2 % pads 1 application, 1 application , Topical, Q12H PRN, Toma Alba PA-C    Physical Exam:  Vital Signs:   Vitals:    01/30/24 1358   BP: 130/84   BP Location: Right arm   Patient Position: Sitting   Cuff Size: Adult   Pulse: 73   SpO2: 97%   Weight: 110 kg (242 lb)   Height: 177.8 cm (70\")       Physical Exam  Constitutional:       General: He is not in acute distress.     Appearance: Normal appearance. He is not diaphoretic.   HENT:      Head: Normocephalic and atraumatic.   Cardiovascular:      Rate and Rhythm: Normal rate and regular rhythm.      Heart sounds: No murmur heard.  Pulmonary:      Effort: Pulmonary effort is normal. No respiratory distress.      Breath sounds: Normal breath sounds. No stridor. No wheezing, rhonchi or rales.   Abdominal:      General: Bowel sounds are normal. There is no distension.      Palpations: Abdomen is soft.      Tenderness: There is no abdominal tenderness. There is no guarding or rebound.   Musculoskeletal:         General: No swelling. Normal range of motion.      Cervical back: Neck supple. No tenderness.   Skin:     General: Skin is warm and dry.      Comments: Sternotomy is well-healed   Neurological:      " General: No focal deficit present.      Mental Status: He is alert and oriented to person, place, and time.   Psychiatric:         Mood and Affect: Mood normal.         Behavior: Behavior normal.         Results Review:   I reviewed the patient's new clinical results.        Assessment / Plan:     1.  Multivessel coronary artery disease   --History of anterior MI in 2011 with PCI to the LAD, subsequent 3V CABG in 3/21  -- Remains active without exertional angina  --Continue aspirin 81 mg daily  --Continue high intensity statin  -- Follow-up in 1 year, sooner if required     2. Essential hypertension  -- BP adequately controlled     3. Ischemic cardiomyopathy  --10/21, LVEF 50% on echocardiogram  -- Euvolemic and well compensated today  --Continue carvedilol and losartan     4. Atrial fibrillation with RVR (HCC)  --Limited episode of postoperative atrial fibrillation  -- No recent episodes of palpitations, no documented recurrence     5. Hyperlipidemia LDL goal <70  -- Lipid profile in 1/24 showed LDL 90, HDL 52, triglycerides 179  --Continue high-intensity statin     6.  Type 2 diabetes mellitus  -- Last hemoglobin A1c 6.8% in 1/24  --On Farxiga    7.  5 mm right middle lobe lung nodule  -- Previously advised follow-up with pulmonology  -- Defer further management to PCP    Follow Up:   Return in about 1 year (around 1/30/2025).      Thank you for allowing me to participate in the care of your patient. Please to not hesitate to contact me with additional questions or concerns.     GISEL Johnson MD  Interventional Cardiology   01/30/2024  14:01 EST

## 2024-01-31 DIAGNOSIS — E11.9 TYPE 2 DIABETES MELLITUS WITHOUT COMPLICATION, WITHOUT LONG-TERM CURRENT USE OF INSULIN: ICD-10-CM

## 2024-01-31 DIAGNOSIS — I25.5 ISCHEMIC CARDIOMYOPATHY: ICD-10-CM

## 2024-01-31 RX ORDER — DAPAGLIFLOZIN 10 MG/1
TABLET, FILM COATED ORAL
Qty: 90 TABLET | Refills: 3 | Status: SHIPPED | OUTPATIENT
Start: 2024-01-31

## 2024-03-24 DIAGNOSIS — E56.9 VITAMIN DEFICIENCY: ICD-10-CM

## 2024-03-25 RX ORDER — ERGOCALCIFEROL 1.25 MG/1
50000 CAPSULE ORAL
Qty: 6 CAPSULE | Refills: 0 | Status: SHIPPED | OUTPATIENT
Start: 2024-03-25

## 2024-04-30 ENCOUNTER — TELEPHONE (OUTPATIENT)
Dept: CARDIOLOGY | Facility: CLINIC | Age: 64
End: 2024-04-30
Payer: COMMERCIAL

## 2024-04-30 NOTE — TELEPHONE ENCOUNTER
Pa was sent from Pharmacy for [facility]. Per PA done from [facility] has to be brand name only. PA was submitted for Brand name only and per cover my meds Pt can get medication without PA done

## 2024-05-02 DIAGNOSIS — E56.9 VITAMIN DEFICIENCY: ICD-10-CM

## 2024-05-02 RX ORDER — ERGOCALCIFEROL 1.25 MG/1
50000 CAPSULE ORAL
Qty: 6 CAPSULE | Refills: 0 | Status: SHIPPED | OUTPATIENT
Start: 2024-05-02

## 2024-05-16 RX ORDER — ATORVASTATIN CALCIUM 80 MG/1
TABLET, FILM COATED ORAL
Qty: 90 TABLET | Refills: 3 | Status: SHIPPED | OUTPATIENT
Start: 2024-05-16

## 2024-07-01 ENCOUNTER — TELEPHONE (OUTPATIENT)
Dept: CARDIOLOGY | Facility: CLINIC | Age: 64
End: 2024-07-01
Payer: COMMERCIAL

## 2024-07-02 DIAGNOSIS — E11.9 TYPE 2 DIABETES MELLITUS WITHOUT COMPLICATION, WITHOUT LONG-TERM CURRENT USE OF INSULIN: ICD-10-CM

## 2024-07-02 DIAGNOSIS — I25.5 ISCHEMIC CARDIOMYOPATHY: ICD-10-CM

## 2024-07-02 NOTE — TELEPHONE ENCOUNTER
Pt hadn't received his Farxiga due to PA (completed now) 3 boxes of samples given  Lot: [id number]  Exp:8/31/2026

## 2024-07-02 NOTE — TELEPHONE ENCOUNTER
Per Cover my meds PA for Farxiga is not needed under brand name. Spoke with pharmacy and asked them to run it as brand name since the brand name is preferred and does not need a prior auth. Pharmacy states after it was ran there is no co pay and will get it ready soon. Pts family notified

## 2024-07-03 RX ORDER — DAPAGLIFLOZIN 10 MG/1
1 TABLET, FILM COATED ORAL DAILY
Qty: 21 TABLET | Refills: 0 | COMMUNITY
Start: 2024-07-03

## 2024-07-11 DIAGNOSIS — E56.9 VITAMIN DEFICIENCY: ICD-10-CM

## 2024-07-12 RX ORDER — ERGOCALCIFEROL 1.25 MG/1
50000 CAPSULE ORAL
Qty: 6 CAPSULE | Refills: 0 | OUTPATIENT
Start: 2024-07-12

## 2024-07-20 DIAGNOSIS — I25.5 ISCHEMIC CARDIOMYOPATHY: ICD-10-CM

## 2024-07-20 DIAGNOSIS — I25.110 CORONARY ARTERY DISEASE INVOLVING NATIVE CORONARY ARTERY OF NATIVE HEART WITH UNSTABLE ANGINA PECTORIS: ICD-10-CM

## 2024-07-22 RX ORDER — CARVEDILOL 6.25 MG/1
6.25 TABLET ORAL 2 TIMES DAILY WITH MEALS
Qty: 180 TABLET | Refills: 3 | Status: SHIPPED | OUTPATIENT
Start: 2024-07-22

## 2024-07-24 DIAGNOSIS — I25.5 ISCHEMIC CARDIOMYOPATHY: ICD-10-CM

## 2024-07-25 RX ORDER — LOSARTAN POTASSIUM 25 MG/1
12.5 TABLET ORAL DAILY
Qty: 45 TABLET | Refills: 0 | Status: SHIPPED | OUTPATIENT
Start: 2024-07-25

## 2024-07-25 NOTE — TELEPHONE ENCOUNTER
Please tell patient I refilled this for 90 days, but he needs to get labs drawn to check potassium within the next week or so to make sure it is safe for him to continue this medication.

## 2024-08-26 ENCOUNTER — HOSPITAL ENCOUNTER (EMERGENCY)
Facility: HOSPITAL | Age: 64
Discharge: HOME | End: 2024-08-26
Attending: STUDENT IN AN ORGANIZED HEALTH CARE EDUCATION/TRAINING PROGRAM | Admitting: STUDENT IN AN ORGANIZED HEALTH CARE EDUCATION/TRAINING PROGRAM
Payer: COMMERCIAL

## 2024-08-26 ENCOUNTER — APPOINTMENT (OUTPATIENT)
Dept: GENERAL RADIOLOGY | Facility: HOSPITAL | Age: 64
End: 2024-08-26
Payer: COMMERCIAL

## 2024-08-26 VITALS
OXYGEN SATURATION: 96 % | TEMPERATURE: 98.3 F | HEART RATE: 66 BPM | HEIGHT: 70 IN | SYSTOLIC BLOOD PRESSURE: 146 MMHG | DIASTOLIC BLOOD PRESSURE: 87 MMHG | BODY MASS INDEX: 34.65 KG/M2 | RESPIRATION RATE: 16 BRPM | WEIGHT: 242 LBS

## 2024-08-26 DIAGNOSIS — R42 VERTIGO: Primary | ICD-10-CM

## 2024-08-26 LAB
ALBUMIN SERPL-MCNC: 4.6 G/DL (ref 3.5–5.2)
ALBUMIN/GLOB SERPL: 1.6 G/DL
ALP SERPL-CCNC: 77 U/L (ref 39–117)
ALT SERPL W P-5'-P-CCNC: 17 U/L (ref 1–41)
ANION GAP SERPL CALCULATED.3IONS-SCNC: 8.7 MMOL/L (ref 5–15)
AST SERPL-CCNC: 19 U/L (ref 1–40)
BASOPHILS # BLD AUTO: 0.05 10*3/MM3 (ref 0–0.2)
BASOPHILS NFR BLD AUTO: 0.4 % (ref 0–1.5)
BILIRUB SERPL-MCNC: 0.7 MG/DL (ref 0–1.2)
BUN SERPL-MCNC: 13 MG/DL (ref 8–23)
BUN/CREAT SERPL: 13.4 (ref 7–25)
CALCIUM SPEC-SCNC: 9.4 MG/DL (ref 8.6–10.5)
CHLORIDE SERPL-SCNC: 103 MMOL/L (ref 98–107)
CO2 SERPL-SCNC: 27.3 MMOL/L (ref 22–29)
CREAT SERPL-MCNC: 0.97 MG/DL (ref 0.76–1.27)
CRP SERPL-MCNC: <0.3 MG/DL (ref 0–0.5)
DEPRECATED RDW RBC AUTO: 44 FL (ref 37–54)
EGFRCR SERPLBLD CKD-EPI 2021: 88.3 ML/MIN/1.73
EOSINOPHIL # BLD AUTO: 0.04 10*3/MM3 (ref 0–0.4)
EOSINOPHIL NFR BLD AUTO: 0.3 % (ref 0.3–6.2)
ERYTHROCYTE [DISTWIDTH] IN BLOOD BY AUTOMATED COUNT: 13 % (ref 12.3–15.4)
GLOBULIN UR ELPH-MCNC: 2.9 GM/DL
GLUCOSE SERPL-MCNC: 107 MG/DL (ref 65–99)
HCT VFR BLD AUTO: 47.6 % (ref 37.5–51)
HGB BLD-MCNC: 15.6 G/DL (ref 13–17.7)
IMM GRANULOCYTES # BLD AUTO: 0.03 10*3/MM3 (ref 0–0.05)
IMM GRANULOCYTES NFR BLD AUTO: 0.2 % (ref 0–0.5)
LYMPHOCYTES # BLD AUTO: 2.56 10*3/MM3 (ref 0.7–3.1)
LYMPHOCYTES NFR BLD AUTO: 20.5 % (ref 19.6–45.3)
MAGNESIUM SERPL-MCNC: 2.1 MG/DL (ref 1.6–2.4)
MCH RBC QN AUTO: 30.2 PG (ref 26.6–33)
MCHC RBC AUTO-ENTMCNC: 32.8 G/DL (ref 31.5–35.7)
MCV RBC AUTO: 92.1 FL (ref 79–97)
MONOCYTES # BLD AUTO: 0.84 10*3/MM3 (ref 0.1–0.9)
MONOCYTES NFR BLD AUTO: 6.7 % (ref 5–12)
NEUTROPHILS NFR BLD AUTO: 71.9 % (ref 42.7–76)
NEUTROPHILS NFR BLD AUTO: 8.94 10*3/MM3 (ref 1.7–7)
NRBC BLD AUTO-RTO: 0 /100 WBC (ref 0–0.2)
NT-PROBNP SERPL-MCNC: 44.6 PG/ML (ref 0–900)
PLATELET # BLD AUTO: 357 10*3/MM3 (ref 140–450)
PMV BLD AUTO: 9.6 FL (ref 6–12)
POTASSIUM SERPL-SCNC: 4.5 MMOL/L (ref 3.5–5.2)
PROCALCITONIN SERPL-MCNC: 0.05 NG/ML (ref 0–0.25)
PROT SERPL-MCNC: 7.5 G/DL (ref 6–8.5)
RBC # BLD AUTO: 5.17 10*6/MM3 (ref 4.14–5.8)
SODIUM SERPL-SCNC: 139 MMOL/L (ref 136–145)
TROPONIN T SERPL HS-MCNC: 20 NG/L
WBC NRBC COR # BLD AUTO: 12.46 10*3/MM3 (ref 3.4–10.8)

## 2024-08-26 PROCEDURE — 80053 COMPREHEN METABOLIC PANEL: CPT | Performed by: STUDENT IN AN ORGANIZED HEALTH CARE EDUCATION/TRAINING PROGRAM

## 2024-08-26 PROCEDURE — 93005 ELECTROCARDIOGRAM TRACING: CPT | Performed by: STUDENT IN AN ORGANIZED HEALTH CARE EDUCATION/TRAINING PROGRAM

## 2024-08-26 PROCEDURE — 83880 ASSAY OF NATRIURETIC PEPTIDE: CPT | Performed by: STUDENT IN AN ORGANIZED HEALTH CARE EDUCATION/TRAINING PROGRAM

## 2024-08-26 PROCEDURE — 96374 THER/PROPH/DIAG INJ IV PUSH: CPT

## 2024-08-26 PROCEDURE — 83735 ASSAY OF MAGNESIUM: CPT | Performed by: STUDENT IN AN ORGANIZED HEALTH CARE EDUCATION/TRAINING PROGRAM

## 2024-08-26 PROCEDURE — 84484 ASSAY OF TROPONIN QUANT: CPT | Performed by: STUDENT IN AN ORGANIZED HEALTH CARE EDUCATION/TRAINING PROGRAM

## 2024-08-26 PROCEDURE — 25010000002 DIAZEPAM PER 5 MG: Performed by: STUDENT IN AN ORGANIZED HEALTH CARE EDUCATION/TRAINING PROGRAM

## 2024-08-26 PROCEDURE — 71045 X-RAY EXAM CHEST 1 VIEW: CPT

## 2024-08-26 PROCEDURE — 85025 COMPLETE CBC W/AUTO DIFF WBC: CPT | Performed by: STUDENT IN AN ORGANIZED HEALTH CARE EDUCATION/TRAINING PROGRAM

## 2024-08-26 PROCEDURE — 36415 COLL VENOUS BLD VENIPUNCTURE: CPT

## 2024-08-26 PROCEDURE — 86140 C-REACTIVE PROTEIN: CPT | Performed by: STUDENT IN AN ORGANIZED HEALTH CARE EDUCATION/TRAINING PROGRAM

## 2024-08-26 PROCEDURE — 99284 EMERGENCY DEPT VISIT MOD MDM: CPT

## 2024-08-26 PROCEDURE — 84145 PROCALCITONIN (PCT): CPT | Performed by: STUDENT IN AN ORGANIZED HEALTH CARE EDUCATION/TRAINING PROGRAM

## 2024-08-26 RX ORDER — MECLIZINE HYDROCHLORIDE 25 MG/1
25 TABLET ORAL 3 TIMES DAILY PRN
Qty: 30 TABLET | Refills: 0 | Status: SHIPPED | OUTPATIENT
Start: 2024-08-26

## 2024-08-26 RX ORDER — SCOLOPAMINE TRANSDERMAL SYSTEM 1 MG/1
1 PATCH, EXTENDED RELEASE TRANSDERMAL ONCE
Status: DISCONTINUED | OUTPATIENT
Start: 2024-08-26 | End: 2024-08-26 | Stop reason: HOSPADM

## 2024-08-26 RX ORDER — DIAZEPAM 10 MG/2ML
5 INJECTION, SOLUTION INTRAMUSCULAR; INTRAVENOUS ONCE
Status: COMPLETED | OUTPATIENT
Start: 2024-08-26 | End: 2024-08-26

## 2024-08-26 RX ORDER — SCOLOPAMINE TRANSDERMAL SYSTEM 1 MG/1
1 PATCH, EXTENDED RELEASE TRANSDERMAL
Qty: 10 EACH | Refills: 0 | Status: SHIPPED | OUTPATIENT
Start: 2024-08-26

## 2024-08-26 RX ADMIN — DIAZEPAM 5 MG: 5 INJECTION INTRAMUSCULAR; INTRAVENOUS at 19:36

## 2024-08-26 RX ADMIN — SCOPOLAMINE 1 PATCH: 1.5 PATCH, EXTENDED RELEASE TRANSDERMAL at 19:36

## 2024-08-26 NOTE — Clinical Note
[facility] EMERGENCY DEPARTMENT  [address] [address]  Phone: [phone]    Sony Marks was seen and treated in our emergency department on 8/26/2024.  He may return to work on 08/30/2024.         Thank you for choosing [facility].    Sony Branch MD

## 2024-08-27 NOTE — DISCHARGE INSTRUCTIONS
You were evaluated due to vertigo.  We did a physical exam and determined that your vertigo was due to a peripheral cause.  This is likely due to a problem in your ear either a viral infection of your ear or stone stuck in your semicircular canal that is called BPPV.  We have sent prescription for medicine called meclizine and scopolamine for help with your symptoms at home.  You are now stable for discharge.  We would recommend following up with primary care doctor to ensure that you improve appropriately.  If you have persistent symptoms, you may be a candidate for vestibular rehab which is a physical therapy that can help with your symptoms.  If you have chest pain, shortness of breath or any new neurologic symptoms please come back to the emergency department for further evaluation.  You are now stable for discharge.

## 2024-08-27 NOTE — ED PROVIDER NOTES
Subjective:  History of Present Illness:    Patient is a 63-year-old male history of CAD, MI, hypertension, hyperlipidemia, type 2 diabetes who presents today with vertigo.  Reports 6 days of increasing vertigo, feels as though the room is spinning.  Associated with nausea.  Denies falls but states he has been unsteady on his feet.  Says that he looked this up and tried the Epley maneuver at home without relief.  Followed up in urgent treatment clinic who sent him to our ER for evaluation.  Denies any history of CVA.  No chest pain.  History of CABG.  Denies any new leg swelling or leg pain.  No abdominal pain vomiting or diarrhea.  Denies dysuria.  No fevers at home.      Nurses Notes reviewed and agree, including vitals, allergies, social history and prior medical history.     REVIEW OF SYSTEMS: All systems reviewed and not pertinent unless noted.  Review of Systems   Constitutional:  Positive for activity change. Negative for appetite change, chills, fatigue and fever.   HENT:  Negative for congestion, sinus pressure, sneezing and trouble swallowing.    Eyes:  Negative for discharge and itching.   Respiratory:  Negative for cough and shortness of breath.    Cardiovascular:  Negative for chest pain and palpitations.   Gastrointestinal:  Negative for abdominal distention and abdominal pain.   Endocrine: Negative for cold intolerance and heat intolerance.   Genitourinary:  Negative for decreased urine volume, dysuria and urgency.   Musculoskeletal:  Negative for gait problem, neck pain and neck stiffness.   Skin:  Negative for color change and rash.   Allergic/Immunologic: Negative for immunocompromised state.   Neurological:  Positive for dizziness. Negative for facial asymmetry and headaches.   Hematological:  Negative for adenopathy.   Psychiatric/Behavioral:  Negative for self-injury and suicidal ideas.        Past Medical History:   Diagnosis Date    Coronary artery disease     Heart attack 01/2011     Hyperlipidemia     Hypertension     Type 2 diabetes mellitus        Allergies:    Patient has no known allergies.      Past Surgical History:   Procedure Laterality Date    CARDIAC CATHETERIZATION      CARDIAC CATHETERIZATION N/A 3/8/2021    Procedure: Coronary angiography;  Surgeon: Sree Johnson MD;  Location:  [facility];  Service: Cardiology;  Laterality: N/A;    CARDIAC CATHETERIZATION N/A 3/8/2021    Procedure: Left Heart Cath;  Surgeon: Sree Johnson MD;  Location:  [facility];  Service: Cardiology;  Laterality: N/A;    COLONOSCOPY  1990    CORONARY ANGIOPLASTY WITH STENT PLACEMENT  01/2011    CORONARY ARTERY BYPASS GRAFT N/A 3/11/2021    Procedure: MEDIAN STERNOTOMY, CORONARY ARTERY BYPASS GRAFTING x3, UTILIZATION OF LEFT INTERAL MAMMARY, EVH OF RIGHT GREATER SAPHENOUS VEIN, GERMAN PER ANESTHESIA;  Surgeon: Panfilo Hamm MD;  Location: [facility] OR;  Service: Cardiothoracic;  Laterality: N/A;  VO: 0845  VR: 0920           Social History     Socioeconomic History    Marital status: Single    Number of children: 2   Tobacco Use    Smoking status: Former     Types: Cigars    Smokeless tobacco: Never    Tobacco comments:     smoked 3 cigars a week;    Vaping Use    Vaping status: Never Used   Substance and Sexual Activity    Alcohol use: Yes     Comment: rarely     Drug use: Yes     Types: Marijuana     Comment: last use of marijuana Dec 2020 ; rarely     Sexual activity: Defer         Family History   Problem Relation Age of Onset    Alzheimer's disease Mother     Bone cancer Mother     Coronary artery disease Father     Heart attack Father     Diabetes Father     Hypertension Father     Lung disease Father     Pulmonary fibrosis Father     No Known Problems Sister     Heart attack Paternal Grandmother     Heart disease Paternal Grandmother     Alzheimer's disease Paternal Grandmother     Hypertension Son     Seizures Son        Objective  Physical Exam:  /87 (BP  "Location: Left arm, Patient Position: Sitting)   Pulse 66   Temp 98.3 °F (36.8 °C) (Oral)   Resp 16   Ht 177.8 cm (70\")   Wt 110 kg (242 lb)   SpO2 96%   BMI 34.72 kg/m²      Physical Exam  Constitutional:       General: He is not in acute distress.     Appearance: Normal appearance. He is normal weight. He is not ill-appearing.   HENT:      Head: Normocephalic and atraumatic.      Nose: Nose normal. No congestion or rhinorrhea.      Mouth/Throat:      Mouth: Mucous membranes are moist.      Pharynx: Oropharynx is clear.   Eyes:      Extraocular Movements: Extraocular movements intact.      Conjunctiva/sclera: Conjunctivae normal.      Pupils: Pupils are equal, round, and reactive to light.   Cardiovascular:      Rate and Rhythm: Normal rate and regular rhythm.      Pulses: Normal pulses.   Pulmonary:      Effort: Pulmonary effort is normal. No respiratory distress.      Breath sounds: Normal breath sounds.   Abdominal:      General: Abdomen is flat. Bowel sounds are normal. There is no distension.      Palpations: Abdomen is soft.      Tenderness: There is no abdominal tenderness.   Musculoskeletal:         General: No swelling or tenderness. Normal range of motion.      Cervical back: Normal range of motion and neck supple. No rigidity or tenderness.   Skin:     General: Skin is warm and dry.      Capillary Refill: Capillary refill takes less than 2 seconds.   Neurological:      General: No focal deficit present.      Mental Status: He is alert and oriented to person, place, and time. Mental status is at baseline.      Cranial Nerves: No cranial nerve deficit.      Sensory: No sensory deficit.      Motor: No weakness.      Coordination: Coordination abnormal.      Comments: Patient with right-sided aural nystagmus, positive head impulse testing to the right, no concern on test of skew, consistent with peripheral cause of vertigo   Psychiatric:         Mood and Affect: Mood normal.         Behavior: Behavior " normal.         Thought Content: Thought content normal.         Judgment: Judgment normal.         Procedures    ED Course:    ED Course as of 08/26/24 2358   Mon Aug 26, 2024   1920 EKG interpreted by me, normal sinus rhythm with no concerning ST changes noted, rate of 74 [JE]      ED Course User Index  [JE] Sony Branch MD       Lab Results (last 24 hours)       Procedure Component Value Units Date/Time    Covid-19 + Flu A&B AG, Veritor (ACD0076) [[id number]]  (Normal) Collected: 08/26/24 1823    Specimen: Swab Updated: 08/26/24 1823     SARS Antigen Not Detected     Influenza A Antigen BERKLEY Not Detected     Influenza B Antigen BERKLEY Not Detected     Internal Control Passed     Lot Number [id number]     Expiration Date 51,825    CBC & Differential [[id number]]  (Abnormal) Collected: 08/26/24 1842    Specimen: Blood Updated: 08/26/24 1852    Narrative:      The following orders were created for panel order CBC & Differential.  Procedure                               Abnormality         Status                     ---------                               -----------         ------                     CBC Auto Differential[[id number]]        Abnormal            Final result                 Please view results for these tests on the individual orders.    Comprehensive Metabolic Panel [[id number]]  (Abnormal) Collected: 08/26/24 1842    Specimen: Blood Updated: 08/26/24 1933     Glucose 107 mg/dL      BUN 13 mg/dL      Creatinine 0.97 mg/dL      Sodium 139 mmol/L      Potassium 4.5 mmol/L      Chloride 103 mmol/L      CO2 27.3 mmol/L      Calcium 9.4 mg/dL      Total Protein 7.5 g/dL      Albumin 4.6 g/dL      ALT (SGPT) 17 U/L      AST (SGOT) 19 U/L      Alkaline Phosphatase 77 U/L      Total Bilirubin 0.7 mg/dL      Globulin 2.9 gm/dL      A/G Ratio 1.6 g/dL      BUN/Creatinine Ratio 13.4     Anion Gap 8.7 mmol/L      eGFR 88.3 mL/min/1.73     Narrative:      GFR Normal >60  Chronic Kidney Disease <60  Kidney Failure  <15      High Sensitivity Troponin T [[id number]]  (Normal) Collected: 08/26/24 1842    Specimen: Blood Updated: 08/26/24 1940     HS Troponin T 20 ng/L     Narrative:      High Sensitive Troponin T Reference Range:  <14.0 ng/L- Negative Female for AMI  <22.0 ng/L- Negative Male for AMI  >=14 - Abnormal Female indicating possible myocardial injury.  >=22 - Abnormal Male indicating possible myocardial injury.   Clinicians would have to utilize clinical acumen, EKG, Troponin, and serial changes to determine if it is an Acute Myocardial Infarction or myocardial injury due to an underlying chronic condition.         BNP [[id number]]  (Normal) Collected: 08/26/24 1842    Specimen: Blood Updated: 08/26/24 1940     proBNP 44.6 pg/mL     Narrative:      This assay is used as an aid in the diagnosis of individuals suspected of having heart failure. It can be used as an aid in the diagnosis of acute decompensated heart failure (ADHF) in patients presenting with signs and symptoms of ADHF to the emergency department (ED). In addition, NT-proBNP of <300 pg/mL indicates ADHF is not likely.    Age Range Result Interpretation  NT-proBNP Concentration (pg/mL:      <50             Positive            >450                   Gray                 300-450                    Negative             <300    50-75           Positive            >900                  Gray                300-900                  Negative            <300      >75             Positive            >1800                  Gray                300-1800                  Negative            <300    C-reactive Protein [[id number]]  (Normal) Collected: 08/26/24 1842    Specimen: Blood Updated: 08/26/24 1934     C-Reactive Protein <0.30 mg/dL     Procalcitonin [[id number]]  (Normal) Collected: 08/26/24 1842    Specimen: Blood Updated: 08/26/24 1938     Procalcitonin 0.05 ng/mL     Narrative:      As a Marker for Sepsis (Non-Neonates):    1. <0.5 ng/mL represents a low risk of  "severe sepsis and/or septic shock.  2. >2 ng/mL represents a high risk of severe sepsis and/or septic shock.    As a Marker for Lower Respiratory Tract Infections that require antibiotic therapy:    PCT on Admission    Antibiotic Therapy       6-12 Hrs later    >0.5                Strongly Recommended  >0.25 - <0.5        Recommended   0.1 - 0.25          Discouraged              Remeasure/reassess PCT  <0.1                Strongly Discouraged     Remeasure/reassess PCT    As 28 day mortality risk marker: \"Change in Procalcitonin Result\" (>80% or <=80%) if Day 0 (or Day 1) and Day 4 values are available. Refer to http://www.[facility]-pct-calculator.com    Change in PCT <=80%  A decrease of PCT levels below or equal to 80% defines a positive change in PCT test result representing a higher risk for 28-day all-cause mortality of patients diagnosed with severe sepsis for septic shock.    Change in PCT >80%  A decrease of PCT levels of more than 80% defines a negative change in PCT result representing a lower risk for 28-day all-cause mortality of patients diagnosed with severe sepsis or septic shock.       Magnesium [[id number]]  (Normal) Collected: 08/26/24 1842    Specimen: Blood Updated: 08/26/24 1933     Magnesium 2.1 mg/dL     CBC Auto Differential [[id number]]  (Abnormal) Collected: 08/26/24 1842    Specimen: Blood Updated: 08/26/24 1852     WBC 12.46 10*3/mm3      RBC 5.17 10*6/mm3      Hemoglobin 15.6 g/dL      Hematocrit 47.6 %      MCV 92.1 fL      MCH 30.2 pg      MCHC 32.8 g/dL      RDW 13.0 %      RDW-SD 44.0 fl      MPV 9.6 fL      Platelets 357 10*3/mm3      Neutrophil % 71.9 %      Lymphocyte % 20.5 %      Monocyte % 6.7 %      Eosinophil % 0.3 %      Basophil % 0.4 %      Immature Grans % 0.2 %      Neutrophils, Absolute 8.94 10*3/mm3      Lymphocytes, Absolute 2.56 10*3/mm3      Monocytes, Absolute 0.84 10*3/mm3      Eosinophils, Absolute 0.04 10*3/mm3      Basophils, Absolute 0.05 10*3/mm3      Immature " Grans, Absolute 0.03 10*3/mm3      nRBC 0.0 /100 WBC              No radiology results from the last 24 hrs       MDM     Amount and/or Complexity of Data Reviewed  Independent visualization of images, tracings, or specimens: yes        Initial impression of presenting illness: Vertigo    DDX: includes but is not limited to: Central vertigo, BPPV, Ménière's disease, lab otitis    Patient arrives stable with vitals interpreted by myself.     Pertinent features from physical exam: Clear to auscultation, regular rate and rhythm, no murmur, nontender to abdominal palpation, patient hints exam consistent with peripheral cause of vertigo.    Initial diagnostic plan: CBC, CMP, troponin, EKG, chest x-ray    Results from initial plan were reviewed and interpreted by me revealing no concern for acute cardiac process    Diagnostic information from other sources: Discussed with wife at bedside    Interventions / Re-evaluation: Given scopolamine, Valium on evaluation patient's symptoms significantly improved    Results/clinical rationale were discussed with patient at bedside    Consultations/Discussion of results with other physicians: Discussed peripheral vertigo with patient.  Given patient's symptoms suspect BPPV.  Have prescribed patient scopolamine and meclizine and encourage patient to follow with his primary care doctor to ensure resolution of the symptoms.  Also discussed with patient if he has persistent symptoms, may benefit from vestibular rehab.  Given strict return precaution for any new neurologic symptoms such as unilateral weakness or facial droop, difficulty with speech.    Disposition plan: Discharge  -----        Final diagnoses:   Vertigo          Sony Branch MD  08/26/24 8208

## 2024-10-20 DIAGNOSIS — I25.5 ISCHEMIC CARDIOMYOPATHY: ICD-10-CM

## 2024-10-23 RX ORDER — LOSARTAN POTASSIUM 25 MG/1
12.5 TABLET ORAL DAILY
Qty: 45 TABLET | Refills: 2 | Status: SHIPPED | OUTPATIENT
Start: 2024-10-23

## 2024-10-23 NOTE — TELEPHONE ENCOUNTER
Caller: [facility]/pharmacy #[id number] - [facility] [address] - [phone] [facility] [phone] FX    Relationship: Pharmacy    Best call back number: [phone] (home)     Requested Prescriptions:   Requested Prescriptions     Pending Prescriptions Disp Refills    losartan (COZAAR) 25 MG tablet [Pharmacy Med Name: LOSARTAN POTASSIUM 25 MG TAB] 45 tablet 0     Sig: TAKE 1/2 TABLET BY MOUTH DAILY        Pharmacy where request should be sent: [id number] [address] [phone] [facility] [phone] FX     Last office visit with prescribing clinician: Visit date not found   Last telemedicine visit with prescribing clinician: Visit date not found   Next office visit with prescribing clinician: Visit date not found     Additional details provided by patient:     Does the patient have less than a 3 day supply:  [] Yes  [x] No    Would you like a call back once the refill request has been completed: [] Yes [x] No    If the office needs to give you a call back, can they leave a voicemail: [] Yes [x] No    Leslie Riddle Rep   10/23/24 14:16 EDT

## 2025-02-10 ENCOUNTER — OFFICE VISIT (OUTPATIENT)
Dept: CARDIOLOGY | Facility: CLINIC | Age: 65
End: 2025-02-10
Payer: COMMERCIAL

## 2025-02-10 VITALS
OXYGEN SATURATION: 97 % | BODY MASS INDEX: 32.98 KG/M2 | HEIGHT: 71 IN | DIASTOLIC BLOOD PRESSURE: 98 MMHG | SYSTOLIC BLOOD PRESSURE: 138 MMHG | WEIGHT: 235.6 LBS | HEART RATE: 88 BPM

## 2025-02-10 DIAGNOSIS — I10 ESSENTIAL HYPERTENSION: ICD-10-CM

## 2025-02-10 DIAGNOSIS — I25.110 CORONARY ARTERY DISEASE INVOLVING NATIVE CORONARY ARTERY OF NATIVE HEART WITH UNSTABLE ANGINA PECTORIS: Primary | ICD-10-CM

## 2025-02-10 DIAGNOSIS — E78.5 HYPERLIPIDEMIA LDL GOAL <70: ICD-10-CM

## 2025-02-10 DIAGNOSIS — I48.91 ATRIAL FIBRILLATION WITH RVR: ICD-10-CM

## 2025-02-10 DIAGNOSIS — Z95.1 S/P CABG (CORONARY ARTERY BYPASS GRAFT): ICD-10-CM

## 2025-02-10 DIAGNOSIS — E11.9 TYPE 2 DIABETES MELLITUS WITHOUT COMPLICATION, WITHOUT LONG-TERM CURRENT USE OF INSULIN: ICD-10-CM

## 2025-02-10 DIAGNOSIS — I25.5 ISCHEMIC CARDIOMYOPATHY: ICD-10-CM

## 2025-02-10 NOTE — PROGRESS NOTES
[facility] Cardiology Office Follow Up Note    Sony Marks  [id number]  02/10/2025    Primary Care Provider: Dimitris Mirza DO    Chief Complaint: Routine follow-up    History of Present Illness:   Mr. Sony Marks is a 64 y.o. male who presents to the Cardiology Clinic for routine follow-up.  The patient has a past medical history significant for hypertension and prior tobacco use.  He has a past cardiac history significant for an anterior STEMI in .  At that time, he reportedly underwent thrombolytic therapy and subsequent thrombectomy with PCI and placement of a 3.0 x 28 mm stent in the LAD.  Most recently, he underwent coronary angiography in  for unstable angina was found to have severe multivessel disease.  He subcentimeter 3V CABG with a LIMA-LAD, SVG-RCA, and SVG-OM1.  Since his last appointment, the patient has done well from a cardiac standpoint.  He has not had any significant chest pain or exertional chest discomfort.  No exertional dyspnea.  His only complaint today is a cough, which has been ongoing for several weeks.  His cough is nonproductive.  No other specific complaints today.    Past Cardiac Testin. Last Coronary Angio: 3/8/2021              1.  Severe multivessel coronary artery disease including:                            -Patent stent in the proximal LAD with severe stenosis in the mid LAD at the outflow of the prior stent                            -Severe stenosis in the proximal portion of a large OM1, with a widely patent stent in the mid OM1                            -Severe stenosis in the proximal RCA              2.  Moderately elevated LVEDP, 25 mmHg.  2. Prior Stress Testing: None  3. Last Echo: 10/28/2021              1. Estimated left ventricular EF = 50% Left ventricular ejection fraction appears to be 46 - 50%. Left ventricular systolic function is normal.              2. Left ventricular diastolic function was  normal.       3. There is calcification of the aortic valve mainly affecting the non-coronary cusp(s).       4. Left ventricular wall thickness is consistent with mild concentric hypertrophy.       5. Estimated right ventricular systolic pressure from tricuspid regurgitation is normal (<35 mmHg). Calculated right ventricular systolic pressure from tricuspid regurgitation is 24 mmHg.  4. Prior Holter Monitor: None    Review of Systems:   Review of Systems   Constitutional:  Negative for activity change, appetite change, chills, diaphoresis, fatigue, fever, unexpected weight gain and unexpected weight loss.   Eyes:  Negative for blurred vision and double vision.   Respiratory:  Positive for cough. Negative for chest tightness, shortness of breath and wheezing.    Cardiovascular:  Negative for chest pain, palpitations and leg swelling.   Gastrointestinal:  Negative for abdominal pain, anal bleeding, blood in stool and GERD.   Endocrine: Negative for cold intolerance and heat intolerance.   Genitourinary:  Negative for hematuria.   Neurological:  Negative for dizziness, syncope, weakness and light-headedness.   Hematological:  Does not bruise/bleed easily.   Psychiatric/Behavioral:  Negative for depressed mood and stress. The patient is not nervous/anxious.        I have reviewed and/or updated the patient's past medical, past surgical, family, social history, problem list and allergies as appropriate.     Medications:     Current Outpatient Medications:     aspirin 81 MG EC tablet, Take 1 tablet by mouth Daily., Disp: 180 tablet, Rfl: 3    atorvastatin (LIPITOR) 80 MG tablet, TAKE 1 TABLET BY MOUTH EVERY DAY, Disp: 90 tablet, Rfl: 3    carvedilol (COREG) 6.25 MG tablet, TAKE 1 TABLET BY MOUTH TWICE A DAY WITH MEALS, Disp: 180 tablet, Rfl: 3    dapagliflozin Propanediol (Farxiga) 10 MG tablet, Take 10 mg by mouth Daily., Disp: 21 tablet, Rfl: 0    losartan (COZAAR) 25 MG tablet, TAKE 1/2 TABLET BY MOUTH DAILY, Disp: 45  "tablet, Rfl: 2    vitamin D (ERGOCALCIFEROL) 1.25 MG (91713 UT) capsule capsule, TAKE 1 CAPSULE BY MOUTH 1 TIME PER WEEK., Disp: 6 capsule, Rfl: 0    meclizine (ANTIVERT) 25 MG tablet, Take 1 tablet by mouth 3 (Three) Times a Day As Needed for Dizziness or Nausea. (Patient not taking: Reported on 2/10/2025), Disp: 30 tablet, Rfl: 0    nitroglycerin (NITROSTAT) 0.4 MG SL tablet, Place 1 tablet under the tongue Every 5 (Five) Minutes As Needed for Chest Pain. 1 under the tongue as needed for angina, may repeat q5mins for up three doses (Patient not taking: Reported on 2/10/2025), Disp: , Rfl:     Scopolamine 1 MG/3DAYS patch, Place 1 patch on the skin as directed by provider Every 72 (Seventy-Two) Hours. (Patient not taking: Reported on 2/10/2025), Disp: 10 each, Rfl: 0  No current facility-administered medications for this visit.    Facility-Administered Medications Ordered in Other Visits:     Chlorhexidine Gluconate Cloth 2 % pads 1 application, 1 application , Topical, Q12H PRN, Toma Alba PA-C    Physical Exam:  Vital Signs:   Vitals:    02/10/25 1300   BP: 138/98   BP Location: Right arm   Patient Position: Sitting   Cuff Size: Adult   Pulse: 88   SpO2: 97%   Weight: 107 kg (235 lb 9.6 oz)   Height: 180.3 cm (71\")       Physical Exam  Constitutional:       General: He is not in acute distress.     Appearance: Normal appearance. He is not diaphoretic.   HENT:      Head: Normocephalic and atraumatic.   Cardiovascular:      Rate and Rhythm: Normal rate and regular rhythm.      Heart sounds: No murmur heard.  Pulmonary:      Effort: Pulmonary effort is normal. No respiratory distress.      Breath sounds: Normal breath sounds. No stridor. No wheezing, rhonchi or rales.   Abdominal:      General: Bowel sounds are normal. There is no distension.      Palpations: Abdomen is soft.      Tenderness: There is no abdominal tenderness. There is no guarding or rebound.   Musculoskeletal:         General: No swelling. " Normal range of motion.      Cervical back: Neck supple. No tenderness.   Skin:     General: Skin is warm and dry.   Neurological:      General: No focal deficit present.      Mental Status: He is alert and oriented to person, place, and time.   Psychiatric:         Mood and Affect: Mood normal.         Behavior: Behavior normal.         Results Review:   I reviewed the patient's new clinical results.        Assessment / Plan:     1.  Multivessel coronary artery disease   --History of anterior MI in 2011 with PCI to the LAD, subsequent 3V CABG in 3/21  -- No anginal symptoms  --Continue aspirin 81 mg daily  --Continue high intensity statin  -- Follow-up in 1 year, sooner if required     2. Essential hypertension  -- BP remains adequately controlled     3. Ischemic cardiomyopathy  --10/21, LVEF 50% on echocardiogram  -- Well compensated without volume overload  --Continue carvedilol and losartan     4. Atrial fibrillation with RVR (HCC)  --Limited episode of postoperative atrial fibrillation  -- No recent episodes of palpitations  --No documented recurrence     5. Hyperlipidemia LDL goal <70  -- Lipid profile in 1/24 showed LDL 90, HDL 52, triglycerides 179  --Continue high-intensity statin     6.  Type 2 diabetes mellitus  -- Last hemoglobin A1c 6.8% in 1/24  --On Farxiga     7.  5 mm right middle lobe lung nodule  -- Again advised to follow-up with pulmonology, however the patient has continued to defer      Follow Up:   Return in about 1 year (around 2/10/2026).      Thank you for allowing me to participate in the care of your patient. Please to not hesitate to contact me with additional questions or concerns.     GISEL Johnson MD  Interventional Cardiology   02/10/2025  12:46 EST

## 2025-04-06 DIAGNOSIS — E56.9 VITAMIN DEFICIENCY: ICD-10-CM

## 2025-04-06 DIAGNOSIS — E11.9 TYPE 2 DIABETES MELLITUS WITHOUT COMPLICATION, WITHOUT LONG-TERM CURRENT USE OF INSULIN: ICD-10-CM

## 2025-04-06 DIAGNOSIS — I25.5 ISCHEMIC CARDIOMYOPATHY: ICD-10-CM

## 2025-04-07 RX ORDER — DAPAGLIFLOZIN 10 MG/1
1 TABLET, FILM COATED ORAL DAILY
Qty: 90 TABLET | Refills: 3 | Status: SHIPPED | OUTPATIENT
Start: 2025-04-07

## 2025-04-07 RX ORDER — ATORVASTATIN CALCIUM 80 MG/1
80 TABLET, FILM COATED ORAL DAILY
Qty: 90 TABLET | Refills: 3 | Status: SHIPPED | OUTPATIENT
Start: 2025-04-07

## 2025-04-10 RX ORDER — ERGOCALCIFEROL 1.25 MG/1
50000 CAPSULE, LIQUID FILLED ORAL WEEKLY
Qty: 6 CAPSULE | Refills: 0 | OUTPATIENT
Start: 2025-04-10

## 2025-04-10 NOTE — TELEPHONE ENCOUNTER
Attempted to schedule patient, he refused stating he hates coming to the doctor and he can buy his vitamin D OTC

## 2025-04-11 NOTE — TELEPHONE ENCOUNTER
He can continue OTC vitamin D and vitamin B12.  As it has been over a year, I would advise on routine follow up for labs

## 2025-07-17 DIAGNOSIS — I25.5 ISCHEMIC CARDIOMYOPATHY: ICD-10-CM

## 2025-07-17 RX ORDER — LOSARTAN POTASSIUM 25 MG/1
12.5 TABLET ORAL DAILY
Qty: 45 TABLET | Refills: 2 | Status: SHIPPED | OUTPATIENT
Start: 2025-07-17

## (undated) DEVICE — GUIDE CATHETER: Brand: MACH1™

## (undated) DEVICE — CVR PROB ULTRASND GLS STRL

## (undated) DEVICE — CATH F6 ST JR 4 100CM: Brand: SUPERTORQUE

## (undated) DEVICE — SYR LUERLOK 30CC

## (undated) DEVICE — LEVEL SENSORS PADS ARE USED TO ATTACH THE LEVEL SENSORS TO A HARD SHELL RESERVOIR. INCLUDES COUPLING GEL.: Brand: TERUMO® ADVANCED PERFUSION SYSTEM 1

## (undated) DEVICE — TR BAND RADIAL ARTERY COMPRESSION DEVICE: Brand: TR BAND

## (undated) DEVICE — ANGIOGRAPHIC CATHETER: Brand: EXPO™

## (undated) DEVICE — SKIN PREP TRAY W/CHG: Brand: MEDLINE INDUSTRIES, INC.

## (undated) DEVICE — CANN AORT ROOT DLP VNT 14G 7F

## (undated) DEVICE — EZ GLIDE AORTIC CANNULA: Brand: EDWARDS LIFESCIENCES EZ GLIDE AORTIC CANNULA

## (undated) DEVICE — ANTIBACTERIAL UNDYED BRAIDED (POLYGLACTIN 910), SYNTHETIC ABSORBABLE SUTURE: Brand: COATED VICRYL

## (undated) DEVICE — SUT SILK 4/0 TIES 18IN A183H

## (undated) DEVICE — FLTR RESERV PERFUS INTERSEPT 02 STRL

## (undated) DEVICE — GLV SURG PREMIERPRO MIC LTX PF SZ7.5 BRN

## (undated) DEVICE — RADIFOCUS OPTITORQUE ANGIOGRAPHIC CATHETER: Brand: OPTITORQUE

## (undated) DEVICE — SUT SILK 2/0 CT1 CR8 18IN C022D

## (undated) DEVICE — Device

## (undated) DEVICE — PK PERFUS CUST W/CARDIOPLEGIA

## (undated) DEVICE — 3M™ MEDIPORE™ H SOFT CLOTH SURGICAL TAPE, 2863, 3 IN X 10 YD, 12/CASE: Brand: 3M™ MEDIPORE™

## (undated) DEVICE — BLD SCLPL BEAVR MINI STR 2BVL 180D LF

## (undated) DEVICE — CANN VESL DLP 1WY BLNT/TP 3MM

## (undated) DEVICE — KT INTRO SHEATH PERC W/FULL DRP 9F

## (undated) DEVICE — SUT PROLN 3/0 V7 D/A 36IN 8976H

## (undated) DEVICE — SUT PROLN 6/0 C1 D/A 30IN 8706H

## (undated) DEVICE — 12 FOOT DISPOSABLE EXTENSION CABLE WITH SAFE CONNECT / SCREW-DOWN

## (undated) DEVICE — GLIDESHEATH SLENDER STAINLESS STEEL KIT: Brand: GLIDESHEATH SLENDER

## (undated) DEVICE — GW WWIJ35260 WHOLEY WIRE V04: Brand: WHOLEY™

## (undated) DEVICE — PK ATS CUST W CARDIOTOMY RESEVOIR

## (undated) DEVICE — 12CC CONTROL SYRINGE – FR/TR/RA W/RESERVOIR: Brand: CONTROL SYRINGE

## (undated) DEVICE — GLV SURG DERMASSURE GRN LF PF SZ 6.5

## (undated) DEVICE — OASIS DRAIN, SINGLE, INLINE & ATS COMPATIBLE: Brand: OASIS

## (undated) DEVICE — SUT PROLN 4/0 V7 36IN 8975H

## (undated) DEVICE — SUT PROLN SURGILENE SURGIPRO 8 0 24IN BL

## (undated) DEVICE — TTL1LYR 16FR10ML 100%SIL TMPST TR: Brand: MEDLINE

## (undated) DEVICE — TUBING, SUCTION, 1/4" X 10', STRAIGHT: Brand: MEDLINE

## (undated) DEVICE — PK HEART OPN 10

## (undated) DEVICE — SUT SILK 0/0 CT2 18IN C027D

## (undated) DEVICE — PK PERFUS W/TB CUST ADLT

## (undated) DEVICE — GW EMR FIX EXCHG J STD .035 3MM 260CM

## (undated) DEVICE — GLV SURG SENSICARE PI LF PF 7.5

## (undated) DEVICE — AVID DUAL STAGE VENOUS DRAINAGE CANNULA: Brand: AVID DUAL STAGE VENOUS DRAINAGE CANNULA

## (undated) DEVICE — TB SXN SURG DLP MALL/CARD SFT/TP 6F 6IN

## (undated) DEVICE — GLV SURG PREMIERPRO MIC LTX PF SZ7 BRN

## (undated) DEVICE — GLV SURG PREMIERPRO MIC LTX PF SZ6.5 BRN

## (undated) DEVICE — TBG SXN INTRACARD RIDGID FLUT 24F .25X13IN A/

## (undated) DEVICE — GLV SURG DERMASSURE GRN LF PF 7.0

## (undated) DEVICE — CATH F6 ST JL 3.5 100CM: Brand: SUPERTORQUE

## (undated) DEVICE — GLV SURG BIOGELULTRATOUCH POLYISPRN PF LF SZ7 STRL

## (undated) DEVICE — SWAN-GANZ CCOMBO V THERMODILUTION CATHETER: Brand: SWAN-GANZ CCOMBO V

## (undated) DEVICE — SENSR CERBRL O2 PK/2

## (undated) DEVICE — SUT PROLN 7/0 BV1 D/A 24IN 8702H

## (undated) DEVICE — SYS VASOVIEW HEMOPRO ENDOSCOPIC HARVST VESL

## (undated) DEVICE — SUCTION CANISTER, 2500CC, RIGID: Brand: DEROYAL

## (undated) DEVICE — 8 FOOT DISPOSABLE EXTENSION CABLE WITH SAFE CONNECT / ALLIGATOR CLIP

## (undated) DEVICE — CLTH CLENS READYCLEANSE PERI CARE PK/5

## (undated) DEVICE — SUT PROLN CARDIO V5 3/0 36IN 8936H

## (undated) DEVICE — GLV SURG SENSICARE PI LF PF 6.5

## (undated) DEVICE — SUT ETHIB 1 CT1 30IN  X425H

## (undated) DEVICE — SUT SILK 2 SUTUPAK TIE 60IN SA8H 2STRAND

## (undated) DEVICE — GLV SURG SENSICARE PI LF PF 7.0

## (undated) DEVICE — ADAPT ANTEGRADE RETRGR

## (undated) DEVICE — ADHS SKIN PREMIERPRO EXOFIN TOPICAL HI/VISC .5ML

## (undated) DEVICE — CATHETER,FOLEY,100%SILICONE,18FR,10ML,LF: Brand: MEDLINE

## (undated) DEVICE — PAD ARMBRD SURG CONVOL 7.5X20X2IN